# Patient Record
Sex: MALE | Race: WHITE | NOT HISPANIC OR LATINO | Employment: OTHER | URBAN - METROPOLITAN AREA
[De-identification: names, ages, dates, MRNs, and addresses within clinical notes are randomized per-mention and may not be internally consistent; named-entity substitution may affect disease eponyms.]

---

## 2017-09-12 ENCOUNTER — HISTORICAL (OUTPATIENT)
Dept: RADIOLOGY | Facility: HOSPITAL | Age: 62
End: 2017-09-12

## 2020-08-03 ENCOUNTER — HISTORICAL (OUTPATIENT)
Dept: RADIOLOGY | Facility: HOSPITAL | Age: 65
End: 2020-08-03

## 2020-08-04 ENCOUNTER — HISTORICAL (OUTPATIENT)
Dept: LAB | Facility: HOSPITAL | Age: 65
End: 2020-08-04

## 2020-08-06 ENCOUNTER — HISTORICAL (OUTPATIENT)
Dept: RESPIRATORY THERAPY | Facility: HOSPITAL | Age: 65
End: 2020-08-06

## 2020-08-19 ENCOUNTER — HISTORICAL (OUTPATIENT)
Dept: RADIOLOGY | Facility: HOSPITAL | Age: 65
End: 2020-08-19

## 2020-08-31 ENCOUNTER — HISTORICAL (OUTPATIENT)
Dept: RADIOLOGY | Facility: HOSPITAL | Age: 65
End: 2020-08-31

## 2021-05-25 ENCOUNTER — HISTORICAL (OUTPATIENT)
Dept: RADIOLOGY | Facility: HOSPITAL | Age: 66
End: 2021-05-25

## 2021-11-22 ENCOUNTER — HISTORICAL (OUTPATIENT)
Dept: LAB | Facility: HOSPITAL | Age: 66
End: 2021-11-22

## 2021-11-22 LAB
ABS NEUT (OLG): 3.1 X10(3)/MCL (ref 1.5–6.9)
ALBUMIN SERPL-MCNC: 4.5 GM/DL (ref 3.4–4.8)
ALBUMIN/GLOB SERPL: 1.5 RATIO (ref 1.1–2)
ALP SERPL-CCNC: 101 UNIT/L (ref 40–150)
ALT SERPL-CCNC: 11 UNIT/L (ref 0–55)
AST SERPL-CCNC: 21 UNIT/L (ref 5–34)
BASOPHILS # BLD AUTO: 0.1 X10(3)/MCL (ref 0–0.1)
BASOPHILS NFR BLD AUTO: 2 % (ref 0–1)
BILIRUB SERPL-MCNC: 1.3 MG/DL
BILIRUBIN DIRECT+TOT PNL SERPL-MCNC: 0.4 MG/DL (ref 0–0.5)
BILIRUBIN DIRECT+TOT PNL SERPL-MCNC: 0.9 MG/DL (ref 0–0.8)
BUN SERPL-MCNC: 8 MG/DL (ref 8.4–25.7)
CALCIUM SERPL-MCNC: 10.2 MG/DL (ref 8.7–10.5)
CHLORIDE SERPL-SCNC: 101 MMOL/L (ref 98–107)
CHOLEST SERPL-MCNC: 207 MG/DL
CHOLEST/HDLC SERPL: 4 {RATIO} (ref 0–5)
CO2 SERPL-SCNC: 30 MMOL/L (ref 23–31)
CREAT SERPL-MCNC: 0.86 MG/DL (ref 0.73–1.18)
EOSINOPHIL # BLD AUTO: 0.4 X10(3)/MCL (ref 0–0.6)
EOSINOPHIL NFR BLD AUTO: 6 % (ref 0–5)
ERYTHROCYTE [DISTWIDTH] IN BLOOD BY AUTOMATED COUNT: 12.6 % (ref 11.5–17)
GLOBULIN SER-MCNC: 3 GM/DL (ref 2.4–3.5)
GLUCOSE SERPL-MCNC: 97 MG/DL (ref 82–115)
HCT VFR BLD AUTO: 46.5 % (ref 42–52)
HDLC SERPL-MCNC: 58 MG/DL (ref 35–60)
HGB BLD-MCNC: 15.4 GM/DL (ref 14–18)
IMM GRANULOCYTES # BLD AUTO: 0.01 10*3/UL (ref 0–0.02)
IMM GRANULOCYTES NFR BLD AUTO: 0.2 % (ref 0–0.43)
LDLC SERPL CALC-MCNC: 134 MG/DL (ref 50–140)
LYMPHOCYTES # BLD AUTO: 2 X10(3)/MCL (ref 0.5–4.1)
LYMPHOCYTES NFR BLD AUTO: 32 % (ref 15–40)
MCH RBC QN AUTO: 31 PG (ref 27–34)
MCHC RBC AUTO-ENTMCNC: 33 GM/DL (ref 31–36)
MCV RBC AUTO: 94 FL (ref 80–99)
MONOCYTES # BLD AUTO: 0.7 X10(3)/MCL (ref 0–1.1)
MONOCYTES NFR BLD AUTO: 12 % (ref 4–12)
NEUTROPHILS # BLD AUTO: 3.1 X10(3)/MCL (ref 1.5–6.9)
NEUTROPHILS NFR BLD AUTO: 49 % (ref 43–75)
PLATELET # BLD AUTO: 238 X10(3)/MCL (ref 140–400)
PMV BLD AUTO: 10.9 FL (ref 6.8–10)
POTASSIUM SERPL-SCNC: 4.6 MMOL/L (ref 3.5–5.1)
PROT SERPL-MCNC: 7.5 GM/DL (ref 5.8–7.6)
PSA SERPL-MCNC: 0.44 NG/ML
RBC # BLD AUTO: 4.93 X10(6)/MCL (ref 4.7–6.1)
SODIUM SERPL-SCNC: 141 MMOL/L (ref 136–145)
TRIGL SERPL-MCNC: 76 MG/DL (ref 34–140)
TSH SERPL-ACNC: 2.21 UIU/ML (ref 0.35–4.94)
VLDLC SERPL CALC-MCNC: 15 MG/DL
WBC # SPEC AUTO: 6.2 X10(3)/MCL (ref 4.5–11.5)

## 2023-04-10 ENCOUNTER — HOSPITAL ENCOUNTER (EMERGENCY)
Facility: HOSPITAL | Age: 68
Discharge: HOME | End: 2023-04-10
Attending: INTERNAL MEDICINE
Payer: COMMERCIAL

## 2023-04-10 VITALS
SYSTOLIC BLOOD PRESSURE: 164 MMHG | HEIGHT: 64 IN | BODY MASS INDEX: 18.27 KG/M2 | DIASTOLIC BLOOD PRESSURE: 79 MMHG | HEART RATE: 91 BPM | WEIGHT: 107 LBS | RESPIRATION RATE: 19 BRPM | TEMPERATURE: 98 F | OXYGEN SATURATION: 98 %

## 2023-04-10 DIAGNOSIS — S63.125A CLOSED DISLOCATION OF INTERPHALANGEAL JOINT OF LEFT THUMB, INITIAL ENCOUNTER: Primary | ICD-10-CM

## 2023-04-10 PROCEDURE — 26770 TREAT FINGER DISLOCATION: CPT | Mod: FA

## 2023-04-10 PROCEDURE — 25000003 PHARM REV CODE 250: Performed by: INTERNAL MEDICINE

## 2023-04-10 PROCEDURE — 99285 EMERGENCY DEPT VISIT HI MDM: CPT | Mod: 25

## 2023-04-10 RX ORDER — LIDOCAINE HYDROCHLORIDE 10 MG/ML
10 INJECTION, SOLUTION EPIDURAL; INFILTRATION; INTRACAUDAL; PERINEURAL
Status: COMPLETED | OUTPATIENT
Start: 2023-04-10 | End: 2023-04-10

## 2023-04-10 RX ORDER — NAPROXEN 500 MG/1
500 TABLET ORAL 2 TIMES DAILY WITH MEALS
Qty: 30 TABLET | Refills: 0 | Status: SHIPPED | OUTPATIENT
Start: 2023-04-10 | End: 2023-04-25

## 2023-04-10 RX ADMIN — LIDOCAINE HYDROCHLORIDE 100 MG: 10 INJECTION, SOLUTION EPIDURAL; INFILTRATION; INTRACAUDAL; PERINEURAL at 10:04

## 2023-04-10 NOTE — DISCHARGE INSTRUCTIONS
See an orthopedics surgeon to evaluate, do further workup and treat it as necessary.    Telephone numbers of Orthopedics Available in [place] are provided above.    If applied , Keep the splint/ Ace on all the time till seen by Orthopedics and treated and cleared.    Take pain medicines as prescribed    Dr. Brock Bee  [phone]    Dr. Gary Kraus,  [phone]      Take medicines as prescribed    See your family doctor in one to 2 days for further evaluation, workup, and treatment as necessary    Avoid driving or operating machinery while taking medicines as some medicines might cause drowsiness and may cause problems. Also pain medicines have potential of being addictive  so use Pain meds specially Narcotics Sparingly.    The exam and treatment you received in Emergency Room was for an urgent problem and NOT INTENDED AS COMPLETE CARE. It is important that you FOLLOW UP with a doctor for ongoing care. If your symptoms become WORSE or you DO NOT IMPROVE and you are unable to reach your health care provider, you should RETURN to the emergency department. The Emergency Room doctor has provided a PRELIMINARY INTERPRETATION of all your STUDIES. A final interpretation may be done after you are discharged. IF A CHANGE in your diagnosis or treatment is needed WE WILL CONTACT YOU. It is critical that we have a CURRENT PHONE NUMBER FOR YOU.
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
## 2024-03-21 ENCOUNTER — HOSPITAL ENCOUNTER (INPATIENT)
Facility: HOSPITAL | Age: 69
LOS: 15 days | Discharge: HOME HEALTH | DRG: 234 | End: 2024-04-05
Attending: INTERNAL MEDICINE | Admitting: INTERNAL MEDICINE
Payer: COMMERCIAL

## 2024-03-21 DIAGNOSIS — I25.10 ATHEROSCLEROSIS OF NATIVE CORONARY ARTERY OF NATIVE HEART WITHOUT ANGINA PECTORIS: ICD-10-CM

## 2024-03-21 DIAGNOSIS — I25.10 CAD (CORONARY ARTERY DISEASE): Primary | ICD-10-CM

## 2024-03-21 DIAGNOSIS — R94.8 ABNORMAL PET SCAN, MEDIASTINUM: ICD-10-CM

## 2024-03-21 DIAGNOSIS — R07.9 CHEST PAIN: ICD-10-CM

## 2024-03-21 DIAGNOSIS — I49.9 ARRHYTHMIA: ICD-10-CM

## 2024-03-21 DIAGNOSIS — R07.9 CHEST PAIN AT REST: ICD-10-CM

## 2024-03-21 DIAGNOSIS — I48.91 A-FIB: ICD-10-CM

## 2024-03-21 LAB
APTT PPP: 41.4 SECONDS (ref 23.2–33.7)
APTT PPP: 43.3 SECONDS (ref 23.2–33.7)
BASOPHILS # BLD AUTO: 0.11 X10(3)/MCL
BASOPHILS NFR BLD AUTO: 1.3 %
EOSINOPHIL # BLD AUTO: 0.26 X10(3)/MCL (ref 0–0.9)
EOSINOPHIL NFR BLD AUTO: 3 %
ERYTHROCYTE [DISTWIDTH] IN BLOOD BY AUTOMATED COUNT: 13.3 % (ref 11.5–17)
HCT VFR BLD AUTO: 36.8 % (ref 42–52)
HGB BLD-MCNC: 12.4 G/DL (ref 14–18)
IMM GRANULOCYTES # BLD AUTO: 0.02 X10(3)/MCL (ref 0–0.04)
IMM GRANULOCYTES NFR BLD AUTO: 0.2 %
INR PPP: 1
LYMPHOCYTES # BLD AUTO: 2.36 X10(3)/MCL (ref 0.6–4.6)
LYMPHOCYTES NFR BLD AUTO: 27.2 %
MCH RBC QN AUTO: 31.5 PG (ref 27–31)
MCHC RBC AUTO-ENTMCNC: 33.7 G/DL (ref 33–36)
MCV RBC AUTO: 93.4 FL (ref 80–94)
MONOCYTES # BLD AUTO: 0.61 X10(3)/MCL (ref 0.1–1.3)
MONOCYTES NFR BLD AUTO: 7 %
NEUTROPHILS # BLD AUTO: 5.32 X10(3)/MCL (ref 2.1–9.2)
NEUTROPHILS NFR BLD AUTO: 61.3 %
NRBC BLD AUTO-RTO: 0 %
PLATELET # BLD AUTO: 237 X10(3)/MCL (ref 130–400)
PMV BLD AUTO: 10 FL (ref 7.4–10.4)
PROTHROMBIN TIME: 13.3 SECONDS (ref 12.5–14.5)
RBC # BLD AUTO: 3.94 X10(6)/MCL (ref 4.7–6.1)
WBC # SPEC AUTO: 8.68 X10(3)/MCL (ref 4.5–11.5)

## 2024-03-21 PROCEDURE — 85025 COMPLETE CBC W/AUTO DIFF WBC: CPT | Performed by: THORACIC SURGERY (CARDIOTHORACIC VASCULAR SURGERY)

## 2024-03-21 PROCEDURE — 21400001 HC TELEMETRY ROOM

## 2024-03-21 PROCEDURE — 4A023N7 MEASUREMENT OF CARDIAC SAMPLING AND PRESSURE, LEFT HEART, PERCUTANEOUS APPROACH: ICD-10-PCS | Performed by: INTERNAL MEDICINE

## 2024-03-21 PROCEDURE — 63600175 PHARM REV CODE 636 W HCPCS: Performed by: THORACIC SURGERY (CARDIOTHORACIC VASCULAR SURGERY)

## 2024-03-21 PROCEDURE — 99223 1ST HOSP IP/OBS HIGH 75: CPT | Mod: ,,, | Performed by: PHYSICIAN ASSISTANT

## 2024-03-21 PROCEDURE — B2181ZZ FLUOROSCOPY OF LEFT INTERNAL MAMMARY BYPASS GRAFT USING LOW OSMOLAR CONTRAST: ICD-10-PCS | Performed by: INTERNAL MEDICINE

## 2024-03-21 PROCEDURE — G0278 ILIAC ART ANGIO,CARDIAC CATH: HCPCS | Performed by: INTERNAL MEDICINE

## 2024-03-21 PROCEDURE — 99153 MOD SED SAME PHYS/QHP EA: CPT | Performed by: INTERNAL MEDICINE

## 2024-03-21 PROCEDURE — C1769 GUIDE WIRE: HCPCS | Performed by: INTERNAL MEDICINE

## 2024-03-21 PROCEDURE — B4101ZZ FLUOROSCOPY OF ABDOMINAL AORTA USING LOW OSMOLAR CONTRAST: ICD-10-PCS | Performed by: INTERNAL MEDICINE

## 2024-03-21 PROCEDURE — 25000003 PHARM REV CODE 250: Performed by: INTERNAL MEDICINE

## 2024-03-21 PROCEDURE — B2151ZZ FLUOROSCOPY OF LEFT HEART USING LOW OSMOLAR CONTRAST: ICD-10-PCS | Performed by: INTERNAL MEDICINE

## 2024-03-21 PROCEDURE — 85730 THROMBOPLASTIN TIME PARTIAL: CPT | Performed by: THORACIC SURGERY (CARDIOTHORACIC VASCULAR SURGERY)

## 2024-03-21 PROCEDURE — 11000001 HC ACUTE MED/SURG PRIVATE ROOM

## 2024-03-21 PROCEDURE — 93459 L HRT ART/GRFT ANGIO: CPT | Performed by: INTERNAL MEDICINE

## 2024-03-21 PROCEDURE — 5A0945A ASSISTANCE WITH RESPIRATORY VENTILATION, 24-96 CONSECUTIVE HOURS, HIGH NASAL FLOW/VELOCITY: ICD-10-PCS | Performed by: THORACIC SURGERY (CARDIOTHORACIC VASCULAR SURGERY)

## 2024-03-21 PROCEDURE — 85730 THROMBOPLASTIN TIME PARTIAL: CPT | Performed by: INTERNAL MEDICINE

## 2024-03-21 PROCEDURE — 63600175 PHARM REV CODE 636 W HCPCS: Performed by: INTERNAL MEDICINE

## 2024-03-21 PROCEDURE — 25500020 PHARM REV CODE 255: Performed by: INTERNAL MEDICINE

## 2024-03-21 PROCEDURE — 99152 MOD SED SAME PHYS/QHP 5/>YRS: CPT | Performed by: INTERNAL MEDICINE

## 2024-03-21 PROCEDURE — 85610 PROTHROMBIN TIME: CPT | Performed by: THORACIC SURGERY (CARDIOTHORACIC VASCULAR SURGERY)

## 2024-03-21 PROCEDURE — B2111ZZ FLUOROSCOPY OF MULTIPLE CORONARY ARTERIES USING LOW OSMOLAR CONTRAST: ICD-10-PCS | Performed by: INTERNAL MEDICINE

## 2024-03-21 PROCEDURE — C1887 CATHETER, GUIDING: HCPCS | Performed by: INTERNAL MEDICINE

## 2024-03-21 PROCEDURE — 27000221 HC OXYGEN, UP TO 24 HOURS

## 2024-03-21 PROCEDURE — 36221 PLACE CATH THORACIC AORTA: CPT | Performed by: INTERNAL MEDICINE

## 2024-03-21 RX ORDER — ASPIRIN 81 MG/1
81 TABLET ORAL DAILY
Status: DISCONTINUED | OUTPATIENT
Start: 2024-03-22 | End: 2024-03-25

## 2024-03-21 RX ORDER — HYDRALAZINE HYDROCHLORIDE 20 MG/ML
10 INJECTION INTRAMUSCULAR; INTRAVENOUS EVERY 4 HOURS PRN
Status: DISCONTINUED | OUTPATIENT
Start: 2024-03-21 | End: 2024-03-22

## 2024-03-21 RX ORDER — HEPARIN SODIUM,PORCINE/D5W 25000/250
0-40 INTRAVENOUS SOLUTION INTRAVENOUS CONTINUOUS
Status: DISCONTINUED | OUTPATIENT
Start: 2024-03-21 | End: 2024-03-21

## 2024-03-21 RX ORDER — DIAZEPAM 5 MG/1
10 TABLET ORAL
Status: DISPENSED | OUTPATIENT
Start: 2024-03-21

## 2024-03-21 RX ORDER — FENTANYL CITRATE 50 UG/ML
INJECTION, SOLUTION INTRAMUSCULAR; INTRAVENOUS
Status: DISCONTINUED | OUTPATIENT
Start: 2024-03-21 | End: 2024-03-21 | Stop reason: HOSPADM

## 2024-03-21 RX ORDER — ATORVASTATIN CALCIUM 40 MG/1
40 TABLET, FILM COATED ORAL DAILY
Status: DISCONTINUED | OUTPATIENT
Start: 2024-03-22 | End: 2024-04-05 | Stop reason: HOSPADM

## 2024-03-21 RX ORDER — PANTOPRAZOLE SODIUM 40 MG/1
40 TABLET, DELAYED RELEASE ORAL DAILY
Status: DISCONTINUED | OUTPATIENT
Start: 2024-03-21 | End: 2024-04-05 | Stop reason: HOSPADM

## 2024-03-21 RX ORDER — ONDANSETRON HYDROCHLORIDE 2 MG/ML
4 INJECTION, SOLUTION INTRAVENOUS EVERY 12 HOURS PRN
Status: DISCONTINUED | OUTPATIENT
Start: 2024-03-21 | End: 2024-04-05 | Stop reason: HOSPADM

## 2024-03-21 RX ORDER — NITROGLYCERIN 0.4 MG/1
0.4 TABLET SUBLINGUAL EVERY 5 MIN PRN
Status: DISCONTINUED | OUTPATIENT
Start: 2024-03-21 | End: 2024-04-05 | Stop reason: HOSPADM

## 2024-03-21 RX ORDER — MIDAZOLAM HYDROCHLORIDE 1 MG/ML
INJECTION INTRAMUSCULAR; INTRAVENOUS
Status: DISCONTINUED | OUTPATIENT
Start: 2024-03-21 | End: 2024-03-21 | Stop reason: HOSPADM

## 2024-03-21 RX ORDER — VERAPAMIL HYDROCHLORIDE 2.5 MG/ML
INJECTION, SOLUTION INTRAVENOUS
Status: DISCONTINUED | OUTPATIENT
Start: 2024-03-21 | End: 2024-03-21 | Stop reason: HOSPADM

## 2024-03-21 RX ORDER — METOPROLOL TARTRATE 25 MG/1
25 TABLET, FILM COATED ORAL DAILY
Status: ON HOLD | COMMUNITY
End: 2024-03-21 | Stop reason: HOSPADM

## 2024-03-21 RX ORDER — DIPHENHYDRAMINE HCL 25 MG
50 CAPSULE ORAL
Status: DISPENSED | OUTPATIENT
Start: 2024-03-21

## 2024-03-21 RX ORDER — AMLODIPINE BESYLATE 5 MG/1
10 TABLET ORAL DAILY
Status: DISCONTINUED | OUTPATIENT
Start: 2024-03-22 | End: 2024-03-25

## 2024-03-21 RX ORDER — SODIUM CHLORIDE 0.9 % (FLUSH) 0.9 %
10 SYRINGE (ML) INJECTION
Status: ACTIVE | OUTPATIENT
Start: 2024-03-21

## 2024-03-21 RX ORDER — MORPHINE SULFATE 4 MG/ML
2 INJECTION, SOLUTION INTRAMUSCULAR; INTRAVENOUS
Status: DISCONTINUED | OUTPATIENT
Start: 2024-03-21 | End: 2024-03-24

## 2024-03-21 RX ORDER — ASPIRIN 81 MG/1
81 TABLET ORAL DAILY
COMMUNITY

## 2024-03-21 RX ORDER — HEPARIN SODIUM,PORCINE/D5W 25000/250
0-40 INTRAVENOUS SOLUTION INTRAVENOUS CONTINUOUS
Status: DISCONTINUED | OUTPATIENT
Start: 2024-03-21 | End: 2024-03-25

## 2024-03-21 RX ORDER — NITROGLYCERIN 20 MG/100ML
INJECTION INTRAVENOUS
Status: DISCONTINUED | OUTPATIENT
Start: 2024-03-21 | End: 2024-03-21 | Stop reason: HOSPADM

## 2024-03-21 RX ORDER — HEPARIN SODIUM 1000 [USP'U]/ML
INJECTION, SOLUTION INTRAVENOUS; SUBCUTANEOUS
Status: DISCONTINUED | OUTPATIENT
Start: 2024-03-21 | End: 2024-03-21 | Stop reason: HOSPADM

## 2024-03-21 RX ORDER — METOPROLOL SUCCINATE 25 MG/1
25 TABLET, EXTENDED RELEASE ORAL DAILY
Status: DISCONTINUED | OUTPATIENT
Start: 2024-03-22 | End: 2024-03-22

## 2024-03-21 RX ORDER — AMLODIPINE BESYLATE 10 MG/1
10 TABLET ORAL DAILY
COMMUNITY

## 2024-03-21 RX ORDER — METOPROLOL SUCCINATE 25 MG/1
25 TABLET, EXTENDED RELEASE ORAL DAILY
Qty: 30 TABLET | Refills: 11 | Status: SHIPPED | OUTPATIENT
Start: 2024-03-21 | End: 2025-03-21

## 2024-03-21 RX ORDER — SODIUM CHLORIDE 9 MG/ML
INJECTION, SOLUTION INTRAVENOUS CONTINUOUS
Status: DISCONTINUED | OUTPATIENT
Start: 2024-03-21 | End: 2024-03-21

## 2024-03-21 RX ORDER — LIDOCAINE HYDROCHLORIDE 10 MG/ML
INJECTION INFILTRATION; PERINEURAL
Status: DISCONTINUED | OUTPATIENT
Start: 2024-03-21 | End: 2024-03-21 | Stop reason: HOSPADM

## 2024-03-21 RX ADMIN — DIPHENHYDRAMINE HYDROCHLORIDE 50 MG: 25 CAPSULE ORAL at 10:03

## 2024-03-21 RX ADMIN — DIAZEPAM 10 MG: 5 TABLET ORAL at 10:03

## 2024-03-21 RX ADMIN — HEPARIN SODIUM 12 UNITS/KG/HR: 10000 INJECTION, SOLUTION INTRAVENOUS at 02:03
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
## 2024-03-21 NOTE — H&P
[facility]    Cardiology  History and Physical     Patient Name: Ata Pickens  MRN: [id number]  Admission Date: 3/21/2024  Code Status: No Order   Attending Provider: Monse Lima MD   Primary Care Physician: No, Primary Doctor  Principal Problem:<principal problem not specified>    Patient information was obtained from patient, past medical records, ER records, and primary team.     Subjective:   Chief Complaint:  Outpatient Cath- MV CAD- CABG Evaluation    HPI:   Mr. Pickens is a 68 year old male, known to Dr. Lima, who presented to the hospital and underwent elective coronary angiogram due to diagnosis of abnormal stress test which was performed in the outpatient setting due to reported history of chest pain, SOB, and fatigue with minimal exertion. He underwent cath on 3.21.24 revealing MV CAD including 90% distal LM/Ostial LAD Disease. Patient's EF on LV Gram noted to be 50%. Patient was admitted to [facility]. CT Surgical services consulted for CABG evaluation.    PMH: Hypertension, Claudication, SOB, CP, Chronic Tobacco Use  PSH: Wrist Surgery, Colostomy Closure  Family History: Father- Lung Cancer, Mother- Lung Cancer, Brother- Cardiac Pacemaker  Social History: Tobacco- Active Smoker, Alcohol- Negative, Substance Abuse- Negative    Previous Cardiac Diagnostics:   Coronary Angiogram (3.21.24):  Coronary findings:  Dominance: right   Left main:  Calcified distal 90% stenosis extending ostium of the LAD.    Left anterior descending artery:  Type three-vessel with calcified ostial stenosis of at least 70%.    Circumflex artery:  Nondominant calcified vessel with proximal 40-50% stenosis.  The circumflex gives rise to a large patent bifurcating OM.  Right coronary artery:  Calcified dominant vessel with serial heavily calcified segment stenosis of up to 80-90%.  The RCA terminates in a moderate bifurcating PDA and a small PL segment.    Selective LIMA angiogram:   Large widely patent vessel  Aortic root angiogram:  Calcified Type 3 bovine arch with patent innominate and proximal ICA bilateral.  Abdominal angiogram: No AAA.  Left iliac system patent.  Left common femoral artery patent proximally.  Right common and internal iliac patent.  Right external iliac with 70-80% stenosis at the pelvic brim.  Right common femoral occluded.  Left ventriculography:  EF- 50%.    Hemodynamics:LV/AO= 0 mmHg                      LVEDP= 13-16 mmHg      Carotid US (3.21.24):  The right internal carotid artery demonstrated 50-69% stenosis.   The left internal carotid artery demonstrated less than 50% stenosis.  Bilateral vertebral arteries were patent with antegrade flow.    Echocardiogram (3.5.24):  The left ventricle is decreased in size. Global left ventricular systolic function is borderline normal. The left ventricular ejection fraction is 50%. The left ventricle diastolic function is impaired (Grade I) with normal left atrial pressure.   The right atrium is moderately enlarged ~5.1 cm.  Mild (1+) tricuspid and trace mitral regurgitation.  The pulmonary artery systolic pressure is 23 mmHg.  The study quality is below average.     CV US Arterial Lower Extremities (3.5.24):  The study quality is good.   The right proximal superficial femoral artery is occluded.   The right mid and distal superficial femoral, posterior tibial, peroneal, anterior tibial, and dorsalis pedis arteries exhibit poor, diminished perfusion via collateral flow.  The right lower extremity arteries exhibit mono-phasic waveforms.  The left external iliac and common femoral arteries exhibit mono-phasic waveforms.  The remaining arteries of the left lower extremity exhibit bi-phasic waveforms.     PET (3.1.24):  This is an abnormal perfusion study. Study is consistent with significant anterior  ischemia.   This scan is suggestive of moderate to high risk for future cardiovascular events.   Large partially reversible  perfusion abnormality of severe intensity in the anterior apical region. Large partially reversible perfusion abnormality of severe intensity in the anterior septal region. Small reversible perfusion abnormality of severe intensity in the apical lateral segment.   The left ventricular cavity is noted to be normal on the stress studies. The stress left ventricular ejection fraction was calculated to be 37% and left ventricular global function is moderately reduced. The rest left ventricular cavity is noted to be normal. The rest left ventricular ejection fraction was calculated to be 52% and rest left ventricular global function is normal.   Hypokinesis of apical anterior segment is noted only in the stress studies which is suggestive of new ischemia. Persistent hypokinesis of the septal region is noted in both rest and stress studies. When compared to the resting ejection fraction (52%), the stress ejection fraction (37%) has decreased.   Transient ischemic dilatation is present and has been described as a marker for high risk coronary artery disease. It has also been described in microvascular disease, hypertensive heart disease as well as cardiac deconditioning.   The study quality is good.   There was no rise in myocardial blood flow between rest and stress.  Global myocardial blood flow reserve was 0.83.  Myocardial blood flow reserve is globally abnormal, placing the patient at a higher coronary event risk.    Review of patient's allergies indicates:   Allergen Reactions    Codeine        No current facility-administered medications on file prior to encounter.     Current Outpatient Medications on File Prior to Encounter   Medication Sig    amLODIPine (NORVASC) 10 MG tablet Take 10 mg by mouth once daily.    aspirin (ECOTRIN) 81 MG EC tablet Take 81 mg by mouth once daily.    [DISCONTINUED] metoprolol tartrate (LOPRESSOR) 25 MG tablet Take 25 mg by mouth once daily.     Review of Systems   Cardiovascular:   Negative for chest pain.   Respiratory:  Negative for shortness of breath.    All other systems reviewed and are negative.    Objective:     Vital Signs (Most Recent):  Temp: 97.6 °F (36.4 °C) (03/21/24 0851)  Pulse: (!) 40 (03/21/24 1230)  Resp: 15 (03/21/24 1230)  BP: (!) 118/58 (03/21/24 1230)  SpO2: 95 % (03/21/24 1230) Vital Signs (24h Range):  Temp:  [97.6 °F (36.4 °C)] 97.6 °F (36.4 °C)  Pulse:  [39-54] 40  Resp:  [15-19] 15  SpO2:  [95 %-98 %] 95 %  BP: (112-158)/(50-86) 118/58     Weight: 48.2 kg (106 lb 4.2 oz)  Body mass index is 18.24 kg/m².    SpO2: 95 %         Intake/Output Summary (Last 24 hours) at 3/21/2024 1247  Last data filed at 3/21/2024 1215  Gross per 24 hour   Intake --   Output 300 ml   Net -300 ml       Lines/Drains/Airways       Peripheral Intravenous Line  Duration                  Peripheral IV - Single Lumen 03/21/24 0900 20 G Anterior;Left Forearm <1 day                    Physical Exam  Vitals and nursing note reviewed.   Constitutional:       General: He is not in acute distress.     Appearance: Normal appearance. He is not ill-appearing.   HENT:      Head: Normocephalic.      Mouth/Throat:      Mouth: Mucous membranes are moist.      Pharynx: Oropharynx is clear.   Cardiovascular:      Rate and Rhythm: Regular rhythm. Bradycardia present.      Heart sounds: Normal heart sounds.   Pulmonary:      Effort: Pulmonary effort is normal. No respiratory distress.      Breath sounds: Normal breath sounds. No wheezing or rales.      Comments: NC 2 L/Min  Abdominal:      Palpations: Abdomen is soft.   Musculoskeletal:         General: Normal range of motion.      Cervical back: Neck supple.      Right lower leg: No edema.      Left lower leg: No edema.      Comments: BLE Warm   Skin:     General: Skin is warm and dry.      Comments: Right Radial TR Band in Progress. No Evidence of bleed or hematoma noted.    Neurological:      General: No focal deficit present.      Mental Status: He is alert  and oriented to person, place, and time. Mental status is at baseline.   Psychiatric:         Mood and Affect: Mood normal.         Behavior: Behavior normal.       Telemetry: Sinus Rhythm     Assessment and Plan:   Impression:  CAD (Multivessel)    - LM: 90% Distal, LAD: 70% Ostial, LCX: 40-50% Proximal, RCA: (Dominant) 80-90% Serially Calcified Lesions (EF 50%) (3.21.24)  Hypertension  PAD    - REIA 70-80% Stenosis at Pelvic Brim (3.21.24)  Nicotine Dependence  ROBBI    - 50-69% MUSTAPHA (CUS 3.21.24)    Plan:  Add Aspirin 81 Mg Daily & Continue Statin  Resume Home Medications, with hold parameters for beta blocker  Start Heparin Infusion (No Bolus) 3 Hours after TR Band Protocol Complete  CT Surgery Consulted for CABG Evaluation  NTG SL PRN CP  Routine Labs in AM  Monitor on Tele    VTE Risk Mitigation (From admission, onward)           Ordered     heparin (porcine) injection  As needed (PRN)         03/21/24 1121                  BOUBACAR Leigh  Cardiology  [facility] - Cath Lab Services  03/21/2024 12:47 PM     I agree with the findings of the complexity of problems addressed and take responsibility for the plan's risks and complications. I approved the plan documented by Chetna Rivera NP.

## 2024-03-21 NOTE — INTERVAL H&P NOTE
Patient name: Ata Pickens  MRN: [id number]  : 1955  Cath Lab Procedure H&P Update    Pre-Procedure Assessment:    I saw and examined the patient face to face. The patient has been re-evaluated and his condition is unchanged. The reason for admission, procedure and care is still present.  Based on the patients H&P, pre-procedure physical exam, relevant diagnostic studies, NPO status and information obtained from the patient, I determine the patient is an appropriate candidate for the proposed procedure and anesthesia planned. I further certify the anesthesia risks, benefits and options have been explained to the patient to which he agrees as documented on the procedural consent.

## 2024-03-22 LAB
ALBUMIN SERPL-MCNC: 3.7 G/DL (ref 3.4–4.8)
ALBUMIN/GLOB SERPL: 1.2 RATIO (ref 1.1–2)
ALP SERPL-CCNC: 77 UNIT/L (ref 40–150)
ALT SERPL-CCNC: 10 UNIT/L (ref 0–55)
APTT PPP: 65.9 SECONDS (ref 23.2–33.7)
APTT PPP: 71.9 SECONDS (ref 23.2–33.7)
AST SERPL-CCNC: 16 UNIT/L (ref 5–34)
BASOPHILS # BLD AUTO: 0.11 X10(3)/MCL
BASOPHILS NFR BLD AUTO: 1.5 %
BILIRUB SERPL-MCNC: 0.9 MG/DL
BUN SERPL-MCNC: 15.6 MG/DL (ref 8.4–25.7)
CALCIUM SERPL-MCNC: 9.3 MG/DL (ref 8.8–10)
CHLORIDE SERPL-SCNC: 105 MMOL/L (ref 98–107)
CO2 SERPL-SCNC: 24 MMOL/L (ref 23–31)
CREAT SERPL-MCNC: 0.86 MG/DL (ref 0.73–1.18)
EOSINOPHIL # BLD AUTO: 0.34 X10(3)/MCL (ref 0–0.9)
EOSINOPHIL NFR BLD AUTO: 4.7 %
ERYTHROCYTE [DISTWIDTH] IN BLOOD BY AUTOMATED COUNT: 13.2 % (ref 11.5–17)
GFR SERPLBLD CREATININE-BSD FMLA CKD-EPI: >60 MLS/MIN/1.73/M2
GLOBULIN SER-MCNC: 3 GM/DL (ref 2.4–3.5)
GLUCOSE SERPL-MCNC: 99 MG/DL (ref 82–115)
HCT VFR BLD AUTO: 37.1 % (ref 42–52)
HGB BLD-MCNC: 12.6 G/DL (ref 14–18)
IMM GRANULOCYTES # BLD AUTO: 0.01 X10(3)/MCL (ref 0–0.04)
IMM GRANULOCYTES NFR BLD AUTO: 0.1 %
LEFT CCA DIST DIAS: 13 CM/S
LEFT CCA DIST SYS: 61 CM/S
LEFT CCA PROX DIAS: 14 CM/S
LEFT CCA PROX SYS: 81 CM/S
LEFT ECA DIAS: 0 CM/S
LEFT ECA SYS: 50 CM/S
LEFT ICA DIST DIAS: 20 CM/S
LEFT ICA DIST SYS: 76 CM/S
LEFT ICA MID DIAS: 25 CM/S
LEFT ICA MID SYS: 90 CM/S
LEFT ICA PROX DIAS: 25 CM/S
LEFT ICA PROX SYS: 98 CM/S
LEFT VERTEBRAL DIAS: 6 CM/S
LEFT VERTEBRAL SYS: 38 CM/S
LYMPHOCYTES # BLD AUTO: 2.62 X10(3)/MCL (ref 0.6–4.6)
LYMPHOCYTES NFR BLD AUTO: 36.3 %
MAGNESIUM SERPL-MCNC: 2 MG/DL (ref 1.6–2.6)
MCH RBC QN AUTO: 31.7 PG (ref 27–31)
MCHC RBC AUTO-ENTMCNC: 34 G/DL (ref 33–36)
MCV RBC AUTO: 93.5 FL (ref 80–94)
MONOCYTES # BLD AUTO: 0.59 X10(3)/MCL (ref 0.1–1.3)
MONOCYTES NFR BLD AUTO: 8.2 %
NEUTROPHILS # BLD AUTO: 3.55 X10(3)/MCL (ref 2.1–9.2)
NEUTROPHILS NFR BLD AUTO: 49.2 %
NRBC BLD AUTO-RTO: 0 %
OHS CV CAROTID RIGHT ICA EDV HIGHEST: 38
OHS CV CAROTID ULTRASOUND LEFT ICA/CCA RATIO: 1.61
OHS CV CAROTID ULTRASOUND RIGHT ICA/CCA RATIO: 4.07
OHS CV PV CAROTID LEFT HIGHEST CCA: 81
OHS CV PV CAROTID LEFT HIGHEST ICA: 98
OHS CV PV CAROTID RIGHT HIGHEST CCA: 75
OHS CV PV CAROTID RIGHT HIGHEST ICA: 171
OHS CV US CAROTID LEFT HIGHEST EDV: 25
OHS QRS DURATION: 96 MS
OHS QTC CALCULATION: 434 MS
PLATELET # BLD AUTO: 209 X10(3)/MCL (ref 130–400)
PMV BLD AUTO: 10 FL (ref 7.4–10.4)
POTASSIUM SERPL-SCNC: 4.2 MMOL/L (ref 3.5–5.1)
PROT SERPL-MCNC: 6.7 GM/DL (ref 5.8–7.6)
RBC # BLD AUTO: 3.97 X10(6)/MCL (ref 4.7–6.1)
RIGHT CCA DIST DIAS: 7 CM/S
RIGHT CCA DIST SYS: 42 CM/S
RIGHT CCA PROX DIAS: 12 CM/S
RIGHT CCA PROX SYS: 75 CM/S
RIGHT ECA DIAS: 0 CM/S
RIGHT ECA SYS: 66 CM/S
RIGHT ICA DIST DIAS: 10 CM/S
RIGHT ICA DIST SYS: 38 CM/S
RIGHT ICA MID DIAS: 38 CM/S
RIGHT ICA MID SYS: 171 CM/S
RIGHT ICA PROX DIAS: 37 CM/S
RIGHT ICA PROX SYS: 167 CM/S
RIGHT VERTEBRAL DIAS: 9 CM/S
RIGHT VERTEBRAL SYS: 88 CM/S
SODIUM SERPL-SCNC: 138 MMOL/L (ref 136–145)
WBC # SPEC AUTO: 7.22 X10(3)/MCL (ref 4.5–11.5)

## 2024-03-22 PROCEDURE — 63600175 PHARM REV CODE 636 W HCPCS: Mod: JZ,JG | Performed by: NURSE PRACTITIONER

## 2024-03-22 PROCEDURE — 85730 THROMBOPLASTIN TIME PARTIAL: CPT | Performed by: INTERNAL MEDICINE

## 2024-03-22 PROCEDURE — 93005 ELECTROCARDIOGRAM TRACING: CPT

## 2024-03-22 PROCEDURE — 63600175 PHARM REV CODE 636 W HCPCS: Performed by: THORACIC SURGERY (CARDIOTHORACIC VASCULAR SURGERY)

## 2024-03-22 PROCEDURE — 25000003 PHARM REV CODE 250: Performed by: NURSE PRACTITIONER

## 2024-03-22 PROCEDURE — 63600175 PHARM REV CODE 636 W HCPCS: Performed by: NURSE PRACTITIONER

## 2024-03-22 PROCEDURE — 21400001 HC TELEMETRY ROOM

## 2024-03-22 PROCEDURE — 85025 COMPLETE CBC W/AUTO DIFF WBC: CPT | Performed by: NURSE PRACTITIONER

## 2024-03-22 PROCEDURE — 83735 ASSAY OF MAGNESIUM: CPT | Performed by: NURSE PRACTITIONER

## 2024-03-22 PROCEDURE — 80053 COMPREHEN METABOLIC PANEL: CPT | Performed by: NURSE PRACTITIONER

## 2024-03-22 PROCEDURE — 99024 POSTOP FOLLOW-UP VISIT: CPT | Mod: ,,, | Performed by: PHYSICIAN ASSISTANT

## 2024-03-22 PROCEDURE — 25000242 PHARM REV CODE 250 ALT 637 W/ HCPCS: Performed by: NURSE PRACTITIONER

## 2024-03-22 PROCEDURE — 93010 ELECTROCARDIOGRAM REPORT: CPT | Mod: ,,, | Performed by: INTERNAL MEDICINE

## 2024-03-22 RX ORDER — ISOSORBIDE MONONITRATE 30 MG/1
30 TABLET, EXTENDED RELEASE ORAL DAILY
Status: DISCONTINUED | OUTPATIENT
Start: 2024-03-22 | End: 2024-03-22

## 2024-03-22 RX ORDER — NITROGLYCERIN 20 MG/100ML
0-400 INJECTION INTRAVENOUS CONTINUOUS
Status: DISCONTINUED | OUTPATIENT
Start: 2024-03-22 | End: 2024-03-26

## 2024-03-22 RX ORDER — HYDRALAZINE HYDROCHLORIDE 20 MG/ML
10 INJECTION INTRAMUSCULAR; INTRAVENOUS EVERY 4 HOURS PRN
Status: DISCONTINUED | OUTPATIENT
Start: 2024-03-22 | End: 2024-04-05 | Stop reason: HOSPADM

## 2024-03-22 RX ADMIN — NITROGLYCERIN 0.4 MG: 0.4 TABLET, ORALLY DISINTEGRATING SUBLINGUAL at 07:03

## 2024-03-22 RX ADMIN — ISOSORBIDE MONONITRATE 30 MG: 30 TABLET, EXTENDED RELEASE ORAL at 05:03

## 2024-03-22 RX ADMIN — PANTOPRAZOLE SODIUM 40 MG: 40 TABLET, DELAYED RELEASE ORAL at 09:03

## 2024-03-22 RX ADMIN — ATORVASTATIN CALCIUM 40 MG: 40 TABLET, FILM COATED ORAL at 09:03

## 2024-03-22 RX ADMIN — AMLODIPINE BESYLATE 10 MG: 5 TABLET ORAL at 09:03

## 2024-03-22 RX ADMIN — NITROGLYCERIN 5 MCG/MIN: 20 INJECTION INTRAVENOUS at 08:03

## 2024-03-22 RX ADMIN — HEPARIN SODIUM 15 UNITS/KG/HR: 10000 INJECTION, SOLUTION INTRAVENOUS at 07:03

## 2024-03-22 RX ADMIN — Medication 81 MG: at 09:03

## 2024-03-22 RX ADMIN — HYDRALAZINE HYDROCHLORIDE 10 MG: 20 INJECTION INTRAMUSCULAR; INTRAVENOUS at 07:03

## 2024-03-22 NOTE — NURSING
Nurses Note -- 4 Eyes      3/21/2024   7:17 PM      Skin assessed during: Admit      [x] No Altered Skin Integrity Present    []Prevention Measures Documented      [] Yes- Altered Skin Integrity Present or Discovered   [] LDA Added if Not in Epic (Describe Wound)   [] New Altered Skin Integrity was Present on Admit and Documented in LDA   [] Wound Image Taken    Wound Care Consulted? No    Attending Nurse:  Yanna Forte RN/Staff Member:   Radha

## 2024-03-22 NOTE — PROGRESS NOTES
[facility] - 6th Floor [facility] Telemetry    Cardiology  Progress Note    Patient Name: Ata Pickens  MRN: [id number]  Admission Date: 3/21/2024  Hospital Length of Stay: 1 days  Code Status: No Order   Attending Physician: Monse Lima MD   Primary Care Physician: Lorraine, Primary Doctor  Expected Discharge Date:   Principal Problem:<principal problem not specified>    Subjective:   Chief Complaint:  Outpatient Cath- MV CAD- CABG Evaluation     HPI:   Mr. Pickens is a 68 year old male, known to Dr. Lima, who presented to the hospital and underwent elective coronary angiogram due to diagnosis of abnormal stress test which was performed in the outpatient setting due to reported history of chest pain, SOB, and fatigue with minimal exertion. He underwent cath on 3.21.24 revealing MV CAD including 90% distal LM/Ostial LAD Disease. Patient's EF on LV Gram noted to be 50%. Patient was admitted to [facility]. CT Surgical services consulted for CABG evaluation.    Hospital Course:  3.22.24: NAD Noted. SB on Tele. BP Stable. CP This AM, Went away on its own. No CP Currently. Right Radial Site soft.     PMH: Hypertension, Claudication, SOB, CP, Chronic Tobacco Use  PSH: Wrist Surgery, Colostomy Closure  Family History: Father- Lung Cancer, Mother- Lung Cancer, Brother- Cardiac Pacemaker  Social History: Tobacco- Active Smoker, Alcohol- Negative, Substance Abuse- Negative     Previous Cardiac Diagnostics:   Coronary Angiogram (3.21.24):  Coronary findings:  Dominance: right   Left main:  Calcified distal 90% stenosis extending ostium of the LAD.    Left anterior descending artery:  Type three-vessel with calcified ostial stenosis of at least 70%.    Circumflex artery:  Nondominant calcified vessel with proximal 40-50% stenosis.  The circumflex gives rise to a large patent bifurcating OM.  Right coronary artery:  Calcified dominant vessel with serial heavily calcified segment stenosis of up to 80-90%.   The RCA terminates in a moderate bifurcating PDA and a small PL segment.    Selective LIMA angiogram:  Large widely patent vessel  Aortic root angiogram:  Calcified Type 3 bovine arch with patent innominate and proximal ICA bilateral.  Abdominal angiogram: No AAA.  Left iliac system patent.  Left common femoral artery patent proximally.  Right common and internal iliac patent.  Right external iliac with 70-80% stenosis at the pelvic brim.  Right common femoral occluded.  Left ventriculography:  EF- 50%.    Hemodynamics:LV/AO= 0 mmHg                      LVEDP= 13-16 mmHg       Carotid US (3.21.24):  The right internal carotid artery demonstrated 50-69% stenosis.   The left internal carotid artery demonstrated less than 50% stenosis.  Bilateral vertebral arteries were patent with antegrade flow.     Echocardiogram (3.5.24):  The left ventricle is decreased in size. Global left ventricular systolic function is borderline normal. The left ventricular ejection fraction is 50%. The left ventricle diastolic function is impaired (Grade I) with normal left atrial pressure.   The right atrium is moderately enlarged ~5.1 cm.  Mild (1+) tricuspid and trace mitral regurgitation.  The pulmonary artery systolic pressure is 23 mmHg.  The study quality is below average.      CV US Arterial Lower Extremities (3.5.24):  The study quality is good.   The right proximal superficial femoral artery is occluded.   The right mid and distal superficial femoral, posterior tibial, peroneal, anterior tibial, and dorsalis pedis arteries exhibit poor, diminished perfusion via collateral flow.  The right lower extremity arteries exhibit mono-phasic waveforms.  The left external iliac and common femoral arteries exhibit mono-phasic waveforms.  The remaining arteries of the left lower extremity exhibit bi-phasic waveforms.      PET (3.1.24):  This is an abnormal perfusion study. Study is consistent with significant anterior  ischemia.   This scan is  suggestive of moderate to high risk for future cardiovascular events.   Large partially reversible perfusion abnormality of severe intensity in the anterior apical region. Large partially reversible perfusion abnormality of severe intensity in the anterior septal region. Small reversible perfusion abnormality of severe intensity in the apical lateral segment.   The left ventricular cavity is noted to be normal on the stress studies. The stress left ventricular ejection fraction was calculated to be 37% and left ventricular global function is moderately reduced. The rest left ventricular cavity is noted to be normal. The rest left ventricular ejection fraction was calculated to be 52% and rest left ventricular global function is normal.   Hypokinesis of apical anterior segment is noted only in the stress studies which is suggestive of new ischemia. Persistent hypokinesis of the septal region is noted in both rest and stress studies. When compared to the resting ejection fraction (52%), the stress ejection fraction (37%) has decreased.   Transient ischemic dilatation is present and has been described as a marker for high risk coronary artery disease. It has also been described in microvascular disease, hypertensive heart disease as well as cardiac deconditioning.   The study quality is good.   There was no rise in myocardial blood flow between rest and stress.  Global myocardial blood flow reserve was 0.83.  Myocardial blood flow reserve is globally abnormal, placing the patient at a higher coronary event risk.    Review of Systems   Cardiovascular:  Negative for leg swelling.        No CP at Current. Had one episode of Sharp CP This AM, Resolved Spontaneously.   Respiratory:  Negative for shortness of breath.    All other systems reviewed and are negative.    Objective:     Vital Signs (Most Recent):  Temp: 97.9 °F (36.6 °C) (03/22/24 0247)  Pulse: (!) 52 (03/22/24 0800)  Resp: 18 (03/22/24 0247)  BP: 134/68  (03/22/24 0247)  SpO2: 97 % (03/22/24 0800) Vital Signs (24h Range):  Temp:  [97.6 °F (36.4 °C)-98.2 °F (36.8 °C)] 97.9 °F (36.6 °C)  Pulse:  [39-63] 52  Resp:  [14-23] 18  SpO2:  [95 %-98 %] 97 %  BP: (112-161)/(50-86) 134/68   Weight: 51.4 kg (113 lb 6.4 oz)  Body mass index is 19.47 kg/m².  SpO2: 97 %       Intake/Output Summary (Last 24 hours) at 3/22/2024 0826  Last data filed at 3/21/2024 1215  Gross per 24 hour   Intake --   Output 300 ml   Net -300 ml     Lines/Drains/Airways       Peripheral Intravenous Line  Duration                  Peripheral IV - Single Lumen 03/21/24 0900 20 G Anterior;Left Forearm <1 day                  Significant Labs:   Recent Results (from the past 72 hour(s))   CV Ultrasound Bilateral Doppler Carotid    Collection Time: 03/21/24 12:33 PM   Result Value Ref Range    Right CCA prox sys 75 cm/s    Right CCA prox erickson 12 cm/s    Right CCA dist sys 42 cm/s    Right CCA dist erickson 7 cm/s    Right ICA prox sys 167 cm/s    Right ICA prox erickson 37 cm/s    Right ICA mid sys 171 cm/s    Right ICA mid eirckson 38 cm/s    Right ICA dist sys 38 cm/s    Right ICA dist erickson 10 cm/s    Right ECA sys 66 cm/s    Right ECA erickson 0 cm/s    Right vertebral sys 88 cm/s    Right vertebral erickson 9 cm/s    Right ICA/CCA ratio 4.07     Right Highest .00     Right Highest EDV 38.00     Right Highest CCA 75     Left CCA prox sys 81 cm/s    Left CCA prox erickson 14 cm/s    Left CCA dist sys 61 cm/s    Left CCA dist erickson 13 cm/s    Left ICA prox sys 98 cm/s    Left ICA prox erickson 25 cm/s    Left ICA mid sys 90 cm/s    Left ICA mid erickson 25 cm/s    Left ICA dist sys 76 cm/s    Left ICA dist erickson 20 cm/s    Left ECA sys 50 cm/s    Left ECA erickson 0 cm/s    Left vertebral sys 38 cm/s    Left vertebral erickson 6 cm/s    Left ICA/CCA ratio 1.61     Left Highest ICA 98.00     LT Highest EDV 25.00     Left Highest CCA 81    APTT    Collection Time: 03/21/24  1:43 PM   Result Value Ref Range    PTT 43.3 (H) 23.2 - 33.7 seconds    Protime-INR    Collection Time: 03/21/24  1:43 PM   Result Value Ref Range    PT 13.3 12.5 - 14.5 seconds    INR 1.0 <=1.3   CBC with Differential    Collection Time: 03/21/24  1:43 PM   Result Value Ref Range    WBC 8.68 4.50 - 11.50 x10(3)/mcL    RBC 3.94 (L) 4.70 - 6.10 x10(6)/mcL    Hgb 12.4 (L) 14.0 - 18.0 g/dL    Hct 36.8 (L) 42.0 - 52.0 %    MCV 93.4 80.0 - 94.0 fL    MCH 31.5 (H) 27.0 - 31.0 pg    MCHC 33.7 33.0 - 36.0 g/dL    RDW 13.3 11.5 - 17.0 %    Platelet 237 130 - 400 x10(3)/mcL    MPV 10.0 7.4 - 10.4 fL    Neut % 61.3 %    Lymph % 27.2 %    Mono % 7.0 %    Eos % 3.0 %    Basophil % 1.3 %    Lymph # 2.36 0.6 - 4.6 x10(3)/mcL    Neut # 5.32 2.1 - 9.2 x10(3)/mcL    Mono # 0.61 0.1 - 1.3 x10(3)/mcL    Eos # 0.26 0 - 0.9 x10(3)/mcL    Baso # 0.11 <=0.2 x10(3)/mcL    IG# 0.02 0 - 0.04 x10(3)/mcL    IG% 0.2 %    NRBC% 0.0 %   APTT    Collection Time: 03/21/24  8:08 PM   Result Value Ref Range    PTT 41.4 (H) 23.2 - 33.7 seconds   Comprehensive metabolic panel    Collection Time: 03/22/24  3:31 AM   Result Value Ref Range    Sodium Level 138 136 - 145 mmol/L    Potassium Level 4.2 3.5 - 5.1 mmol/L    Chloride 105 98 - 107 mmol/L    Carbon Dioxide 24 23 - 31 mmol/L    Glucose Level 99 82 - 115 mg/dL    Blood Urea Nitrogen 15.6 8.4 - 25.7 mg/dL    Creatinine 0.86 0.73 - 1.18 mg/dL    Calcium Level Total 9.3 8.8 - 10.0 mg/dL    Protein Total 6.7 5.8 - 7.6 gm/dL    Albumin Level 3.7 3.4 - 4.8 g/dL    Globulin 3.0 2.4 - 3.5 gm/dL    Albumin/Globulin Ratio 1.2 1.1 - 2.0 ratio    Bilirubin Total 0.9 <=1.5 mg/dL    Alkaline Phosphatase 77 40 - 150 unit/L    Alanine Aminotransferase 10 0 - 55 unit/L    Aspartate Aminotransferase 16 5 - 34 unit/L    eGFR >60 mls/min/1.73/m2   Magnesium    Collection Time: 03/22/24  3:31 AM   Result Value Ref Range    Magnesium Level 2.00 1.60 - 2.60 mg/dL   APTT    Collection Time: 03/22/24  3:31 AM   Result Value Ref Range    PTT 71.9 (H) 23.2 - 33.7 seconds   CBC with Differential     Collection Time: 03/22/24  3:31 AM   Result Value Ref Range    WBC 7.22 4.50 - 11.50 x10(3)/mcL    RBC 3.97 (L) 4.70 - 6.10 x10(6)/mcL    Hgb 12.6 (L) 14.0 - 18.0 g/dL    Hct 37.1 (L) 42.0 - 52.0 %    MCV 93.5 80.0 - 94.0 fL    MCH 31.7 (H) 27.0 - 31.0 pg    MCHC 34.0 33.0 - 36.0 g/dL    RDW 13.2 11.5 - 17.0 %    Platelet 209 130 - 400 x10(3)/mcL    MPV 10.0 7.4 - 10.4 fL    Neut % 49.2 %    Lymph % 36.3 %    Mono % 8.2 %    Eos % 4.7 %    Basophil % 1.5 %    Lymph # 2.62 0.6 - 4.6 x10(3)/mcL    Neut # 3.55 2.1 - 9.2 x10(3)/mcL    Mono # 0.59 0.1 - 1.3 x10(3)/mcL    Eos # 0.34 0 - 0.9 x10(3)/mcL    Baso # 0.11 <=0.2 x10(3)/mcL    IG# 0.01 0 - 0.04 x10(3)/mcL    IG% 0.1 %    NRBC% 0.0 %   EKG 12-LEAD starting tomorrow    Collection Time: 03/22/24  5:41 AM   Result Value Ref Range    QRS Duration 96 ms    [facility] QTC Calculation 434 ms     Telemetry:  SB    Physical Exam  Vitals and nursing note reviewed.   Constitutional:       Appearance: Normal appearance.   HENT:      Head: Normocephalic.      Mouth/Throat:      Mouth: Mucous membranes are moist.      Pharynx: Oropharynx is clear.   Cardiovascular:      Rate and Rhythm: Regular rhythm. Bradycardia present.      Pulses: Normal pulses.           Radial pulses are 2+ on the right side.      Heart sounds: Normal heart sounds.   Pulmonary:      Effort: Pulmonary effort is normal. No respiratory distress.      Breath sounds: Normal breath sounds.   Abdominal:      General: There is no distension.      Palpations: Abdomen is soft.      Tenderness: There is no abdominal tenderness. There is no guarding.   Musculoskeletal:         General: Normal range of motion.      Cervical back: Neck supple.      Right lower leg: No edema.      Left lower leg: No edema.   Skin:     General: Skin is warm and dry.      Comments: Right Radial Cath Site soft with no evidence of hematoma or bleeding noted.    Neurological:      General: No focal deficit present.      Mental Status: He is  alert and oriented to person, place, and time. Mental status is at baseline.   Psychiatric:         Mood and Affect: Mood normal.         Behavior: Behavior normal.         Thought Content: Thought content normal.         Judgment: Judgment normal.       Current Inpatient Medications:    Current Facility-Administered Medications:     amLODIPine tablet 10 mg, 10 mg, Oral, Daily, Miguel, Rein T, FNP    aspirin EC tablet 81 mg, 81 mg, Oral, Daily, Miguel Rein T, FNP    atorvastatin tablet 40 mg, 40 mg, Oral, Daily, Brock Young, LEXIIP    diazePAM tablet 10 mg, 10 mg, Oral, On Call Procedure, Monse Lima MD, 10 mg at 03/21/24 1021    diphenhydrAMINE capsule 50 mg, 50 mg, Oral, On Call Procedure, Monse Lima MD, 50 mg at 03/21/24 1021    heparin 25,000 units in dextrose 5% (100 units/ml) IV bolus from bag LOW INTENSITY nomogram - [facility], 60 Units/kg (Adjusted), Intravenous, PRN, Reji Sullivan MD, 2,890 Units at 03/21/24 2056    heparin 25,000 units in dextrose 5% (100 units/ml) IV bolus from bag LOW INTENSITY nomogram - [facility], 30 Units/kg (Adjusted), Intravenous, PRN, Reji Sullivan MD    heparin 25,000 units in dextrose 5% 250 mL (100 units/mL) infusion LOW INTENSITY nomogram - [facility], 0-40 Units/kg/hr (Adjusted), Intravenous, Continuous, Reji Sullivan MD, Last Rate: 7.2 mL/hr at 03/21/24 2051, 15 Units/kg/hr at 03/21/24 2051    hydrALAZINE injection 10 mg, 10 mg, Intravenous, Q4H PRN, Brock Young, LEXIIP    metoprolol succinate (TOPROL-XL) 24 hr tablet 25 mg, 25 mg, Oral, Daily, Chetna Rivera T, FNP    morphine injection 2 mg, 2 mg, Intravenous, Q1H PRN, Brock Young, FNP    nitroGLYCERIN SL tablet 0.4 mg, 0.4 mg, Sublingual, Q5 Min PRN, Chetna Rivera T, FNP    ondansetron injection 4 mg, 4 mg, Intravenous, Q12H PRN, Brock Young, FNP    pantoprazole EC tablet 40 mg, 40 mg, Oral, Daily, Chetna Rivera, FNP    sodium chloride 0.9% flush 10 mL, 10 mL, Intravenous, PRN, Janie,  MD Monse  VTE Risk Mitigation (From admission, onward)           Ordered     heparin 25,000 units in dextrose 5% (100 units/ml) IV bolus from bag LOW INTENSITY nomogram - [facility]  As needed (PRN)        Question:  Heparin Infusion Adjustment (DO NOT MODIFY ANSWER)  Answer:  [email or web address]\Images\Pharmacy\HeparinInfusions\heparin LOW INTENSITY nomogram for [facility] [id number]    03/21/24 1348     heparin 25,000 units in dextrose 5% (100 units/ml) IV bolus from bag LOW INTENSITY nomogram - [facility]  As needed (PRN)        Question:  Heparin Infusion Adjustment (DO NOT MODIFY ANSWER)  Answer:  [email or web address]\Images\Pharmacy\HeparinInfusions\heparin LOW INTENSITY nomogram for [facility] [id number]    03/21/24 1348     heparin 25,000 units in dextrose 5% 250 mL (100 units/mL) infusion LOW INTENSITY nomogram - [facility]  Continuous        Question:  Begin at (units/kg/hr)  Answer:  12    03/21/24 1402                  Assessment:   CAD (Multivessel)    - LM: 90% Distal, LAD: 70% Ostial, LCX: 40-50% Proximal, RCA: (Dominant) 80-90% Serially Calcified Lesions (EF 50%) (3.21.24)  Hypertension (BP Stable)  PAD    - REIA 70-80% Stenosis at Pelvic Brim (3.21.24)  Nicotine Dependence  ROBBI    - 50-69% MUSTAPHA (CUS 3.21.24)  Sinus Bradycardia    Plan:   Continue Aspirin 81 Mg Daily & Continue Statin  Hold Beta Blocker for now given Bradycardia  Continue Heparin Infusion Per Protocol Re: MV CAD (Left Main Disease)  CT Surgery Consulted for CABG Evaluation, Planning for Monday 3.25.24.  NTG SL PRN CP  Monitor on Tele  Patient educated on the importance of notifying nursing staff should CP Recur and persist.  Right Radial Precautions    BOUBACAR Leigh  Cardiology  [facility] - 6th Floor Medical Telemetry  03/22/2024     I personally reviewed the NP history and physical above.  See below MDM component performed by me (Jayson Oconnell MD):    CAD, multivessel and L main disease    Feeling ok, but did have some short CP for about 1 min that  resolved on its own    BP getting a little high, will add some Imdur  PAD  CVD  HTN    Consider ARB if BP needs it  Smoker  Evans, BB on hold    Plan:  Cont ASA and Heparin  CABG eval in progress, planning for Monday  Imdur 30 mg daily, first now

## 2024-03-22 NOTE — PLAN OF CARE
03/22/24 0911   Discharge Assessment   Assessment Type Discharge Planning Assessment   Confirmed/corrected address, phone number and insurance Yes   Confirmed Demographics Correct on Facesheet   Source of Information patient   When was your last doctors appointment? 03/19/24   Communicated NITHYA with patient/caregiver Date not available/Unable to determine   Reason For Admission CP, multivessel CAD   People in Home spouse;grandchild(oli)   Do you expect to return to your current living situation? Yes   Do you have help at home or someone to help you manage your care at home? Yes   Who are your caregiver(s) and their phone number(s)? Doris [phone]   Prior to hospitilization cognitive status: Alert/Oriented   Current cognitive status: Alert/Oriented   Dressing/Bathing Difficulty no   Home Accessibility stairs to enter home   Number of Stairs, Main Entrance three   Stair Railings, Main Entrance railings on both sides of stairs   Home Layout Able to live on 1st floor   Equipment Currently Used at Home none   Readmission within 30 days? No   Patient currently being followed by outpatient case management? No   Do you currently have service(s) that help you manage your care at home? No   Do you take prescription medications? Yes   Do you have any problems affording any of your prescribed medications? No   Is the patient taking medications as prescribed? yes   Who is going to help you get home at discharge? family   How do you get to doctors appointments? family or friend will provide   Are you on dialysis? No   Do you take coumadin? No   Discharge Plan A Home Health   Discharge Plan B Home Health   DME Needed Upon Discharge  other (see comments)  (tbd)   Discharge Plan discussed with: Patient;Adult children   Transition of Care Barriers None   Financial Resource Strain   How hard is it for you to pay for the very basics like food, housing, medical care, and heating? Not very   Housing Stability   In the last 12  months, was there a time when you were not able to pay the mortgage or rent on time? N   In the last 12 months, was there a time when you did not have a steady place to sleep or slept in a shelter (including now)? N   Transportation Needs   In the past 12 months, has lack of transportation kept you from medical appointments or from getting medications? no   In the past 12 months, has lack of transportation kept you from meetings, work, or from getting things needed for daily living? No   Food Insecurity   Within the past 12 months, you worried that your food would run out before you got the money to buy more. Never true   Within the past 12 months, the food you bought just didn't last and you didn't have money to get more. Never true   Social Connections   In a typical week, how many times do you talk on the phone with family, friends, or neighbors? More than 3   How often do you get together with friends or relatives? More than 3   How often do you attend Pentecostal or Sikhism services? Never   Are you , , , , never , or living with a partner?    Alcohol Use   Q1: How often do you have a drink containing alcohol? Never   Q2: How many drinks containing alcohol do you have on a typical day when you are drinking? None   Q3: How often do you have six or more drinks on one occasion? Never   OTHER   Name(s) of People in Home CindiDaniel lucia     Pharmacy: [facility] in [place]  PCP: Manolo Waller NP ([place]) [phone]  Pt states he is scheduled for CABG on 5/25/24.  Pt Choice for HH given to pt/dgt to review.

## 2024-03-23 LAB
APTT PPP: 52.5 SECONDS (ref 23.2–33.7)
APTT PPP: 68.3 SECONDS (ref 23.2–33.7)
APTT PPP: 69.2 SECONDS (ref 23.2–33.7)
BASOPHILS # BLD AUTO: 0.1 X10(3)/MCL
BASOPHILS NFR BLD AUTO: 1.2 %
EOSINOPHIL # BLD AUTO: 0.21 X10(3)/MCL (ref 0–0.9)
EOSINOPHIL NFR BLD AUTO: 2.6 %
ERYTHROCYTE [DISTWIDTH] IN BLOOD BY AUTOMATED COUNT: 13.3 % (ref 11.5–17)
HCT VFR BLD AUTO: 36.2 % (ref 42–52)
HGB BLD-MCNC: 12.2 G/DL (ref 14–18)
IMM GRANULOCYTES # BLD AUTO: 0.02 X10(3)/MCL (ref 0–0.04)
IMM GRANULOCYTES NFR BLD AUTO: 0.2 %
LYMPHOCYTES # BLD AUTO: 1.69 X10(3)/MCL (ref 0.6–4.6)
LYMPHOCYTES NFR BLD AUTO: 20.7 %
MCH RBC QN AUTO: 31.6 PG (ref 27–31)
MCHC RBC AUTO-ENTMCNC: 33.7 G/DL (ref 33–36)
MCV RBC AUTO: 93.8 FL (ref 80–94)
MONOCYTES # BLD AUTO: 0.56 X10(3)/MCL (ref 0.1–1.3)
MONOCYTES NFR BLD AUTO: 6.9 %
NEUTROPHILS # BLD AUTO: 5.57 X10(3)/MCL (ref 2.1–9.2)
NEUTROPHILS NFR BLD AUTO: 68.4 %
NRBC BLD AUTO-RTO: 0 %
OHS QRS DURATION: 72 MS
OHS QTC CALCULATION: 426 MS
PLATELET # BLD AUTO: 250 X10(3)/MCL (ref 130–400)
PMV BLD AUTO: 10.1 FL (ref 7.4–10.4)
RBC # BLD AUTO: 3.86 X10(6)/MCL (ref 4.7–6.1)
WBC # SPEC AUTO: 8.15 X10(3)/MCL (ref 4.5–11.5)

## 2024-03-23 PROCEDURE — 63600175 PHARM REV CODE 636 W HCPCS: Performed by: THORACIC SURGERY (CARDIOTHORACIC VASCULAR SURGERY)

## 2024-03-23 PROCEDURE — 25000003 PHARM REV CODE 250: Performed by: NURSE PRACTITIONER

## 2024-03-23 PROCEDURE — 21400001 HC TELEMETRY ROOM

## 2024-03-23 PROCEDURE — 85730 THROMBOPLASTIN TIME PARTIAL: CPT | Performed by: INTERNAL MEDICINE

## 2024-03-23 PROCEDURE — 85025 COMPLETE CBC W/AUTO DIFF WBC: CPT | Performed by: THORACIC SURGERY (CARDIOTHORACIC VASCULAR SURGERY)

## 2024-03-23 RX ADMIN — AMLODIPINE BESYLATE 10 MG: 5 TABLET ORAL at 08:03

## 2024-03-23 RX ADMIN — HEPARIN SODIUM 17 UNITS/KG/HR: 10000 INJECTION, SOLUTION INTRAVENOUS at 07:03

## 2024-03-23 RX ADMIN — PANTOPRAZOLE SODIUM 40 MG: 40 TABLET, DELAYED RELEASE ORAL at 08:03

## 2024-03-23 RX ADMIN — ATORVASTATIN CALCIUM 40 MG: 40 TABLET, FILM COATED ORAL at 08:03

## 2024-03-23 RX ADMIN — Medication 81 MG: at 08:03

## 2024-03-23 NOTE — PROGRESS NOTES
[facility] - 6th Floor [facility] Telemetry    Cardiology  Progress Note    Patient Name: Ata Pickens  MRN: [id number]  Admission Date: 3/21/2024  Hospital Length of Stay: 2 days  Code Status: No Order   Attending Physician: Monse Lima MD   Primary Care Physician: Lorraine, Primary Doctor  Expected Discharge Date:   Principal Problem:<principal problem not specified>    Subjective:   Chief Complaint:  Outpatient Cath- MV CAD- CABG Evaluation     HPI:   Mr. Pickens is a 68 year old male, known to Dr. Lima, who presented to the hospital and underwent elective coronary angiogram due to diagnosis of abnormal stress test which was performed in the outpatient setting due to reported history of chest pain, SOB, and fatigue with minimal exertion. He underwent cath on 3.21.24 revealing MV CAD including 90% distal LM/Ostial LAD Disease. Patient's EF on LV Gram noted to be 50%. Patient was admitted to [facility]. CT Surgical services consulted for CABG evaluation.    Hospital Course:  3.22.24: NAD Noted. SB on Tele. BP Stable. CP This AM, Went away on its own. No CP Currently. Right Radial Site soft.  3.23.24: NAD Noted. SR on Tele. BP Stable. On Heparin Infusion.     PMH: Hypertension, Claudication, SOB, CP, Chronic Tobacco Use  PSH: Wrist Surgery, Colostomy Closure  Family History: Father- Lung Cancer, Mother- Lung Cancer, Brother- Cardiac Pacemaker  Social History: Tobacco- Active Smoker, Alcohol- Negative, Substance Abuse- Negative     Previous Cardiac Diagnostics:   Coronary Angiogram (3.21.24):  Coronary findings:  Dominance: right   Left main:  Calcified distal 90% stenosis extending ostium of the LAD.    Left anterior descending artery:  Type three-vessel with calcified ostial stenosis of at least 70%.    Circumflex artery:  Nondominant calcified vessel with proximal 40-50% stenosis.  The circumflex gives rise to a large patent bifurcating OM.  Right coronary artery:  Calcified dominant vessel  with serial heavily calcified segment stenosis of up to 80-90%.  The RCA terminates in a moderate bifurcating PDA and a small PL segment.    Selective LIMA angiogram:  Large widely patent vessel  Aortic root angiogram:  Calcified Type 3 bovine arch with patent innominate and proximal ICA bilateral.  Abdominal angiogram: No AAA.  Left iliac system patent.  Left common femoral artery patent proximally.  Right common and internal iliac patent.  Right external iliac with 70-80% stenosis at the pelvic brim.  Right common femoral occluded.  Left ventriculography:  EF- 50%.    Hemodynamics:LV/AO= 0 mmHg                      LVEDP= 13-16 mmHg       Carotid US (3.21.24):  The right internal carotid artery demonstrated 50-69% stenosis.   The left internal carotid artery demonstrated less than 50% stenosis.  Bilateral vertebral arteries were patent with antegrade flow.     Echocardiogram (3.5.24):  The left ventricle is decreased in size. Global left ventricular systolic function is borderline normal. The left ventricular ejection fraction is 50%. The left ventricle diastolic function is impaired (Grade I) with normal left atrial pressure.   The right atrium is moderately enlarged ~5.1 cm.  Mild (1+) tricuspid and trace mitral regurgitation.  The pulmonary artery systolic pressure is 23 mmHg.  The study quality is below average.      CV US Arterial Lower Extremities (3.5.24):  The study quality is good.   The right proximal superficial femoral artery is occluded.   The right mid and distal superficial femoral, posterior tibial, peroneal, anterior tibial, and dorsalis pedis arteries exhibit poor, diminished perfusion via collateral flow.  The right lower extremity arteries exhibit mono-phasic waveforms.  The left external iliac and common femoral arteries exhibit mono-phasic waveforms.  The remaining arteries of the left lower extremity exhibit bi-phasic waveforms.      PET (3.1.24):  This is an abnormal perfusion study. Study  is consistent with significant anterior  ischemia.   This scan is suggestive of moderate to high risk for future cardiovascular events.   Large partially reversible perfusion abnormality of severe intensity in the anterior apical region. Large partially reversible perfusion abnormality of severe intensity in the anterior septal region. Small reversible perfusion abnormality of severe intensity in the apical lateral segment.   The left ventricular cavity is noted to be normal on the stress studies. The stress left ventricular ejection fraction was calculated to be 37% and left ventricular global function is moderately reduced. The rest left ventricular cavity is noted to be normal. The rest left ventricular ejection fraction was calculated to be 52% and rest left ventricular global function is normal.   Hypokinesis of apical anterior segment is noted only in the stress studies which is suggestive of new ischemia. Persistent hypokinesis of the septal region is noted in both rest and stress studies. When compared to the resting ejection fraction (52%), the stress ejection fraction (37%) has decreased.   Transient ischemic dilatation is present and has been described as a marker for high risk coronary artery disease. It has also been described in microvascular disease, hypertensive heart disease as well as cardiac deconditioning.   The study quality is good.   There was no rise in myocardial blood flow between rest and stress.  Global myocardial blood flow reserve was 0.83.  Myocardial blood flow reserve is globally abnormal, placing the patient at a higher coronary event risk.    Review of Systems   Cardiovascular:  Negative for chest pain.   Respiratory:  Negative for shortness of breath.      Objective:     Vital Signs (Most Recent):  Temp: 98 °F (36.7 °C) (03/23/24 0810)  Pulse: (!) 59 (03/23/24 0810)  Resp: 16 (03/23/24 0810)  BP: 116/69 (03/23/24 0810)  SpO2: 97 % (03/23/24 0810) Vital Signs (24h Range):  Temp:   [96.9 °F (36.1 °C)-98.1 °F (36.7 °C)] 98 °F (36.7 °C)  Pulse:  [51-81] 59  Resp:  [16-18] 16  SpO2:  [96 %-100 %] 97 %  BP: (112-198)/(61-99) 116/69   Weight: 51.4 kg (113 lb 6.4 oz)  Body mass index is 19.47 kg/m².  SpO2: 97 %       Intake/Output Summary (Last 24 hours) at 3/23/2024 1021  Last data filed at 3/23/2024 0440  Gross per 24 hour   Intake 642 ml   Output 900 ml   Net -258 ml       Lines/Drains/Airways       Peripheral Intravenous Line  Duration                  Peripheral IV - Single Lumen 03/21/24 0900 20 G Anterior;Left Forearm 2 days         Peripheral IV - Single Lumen 03/22/24 2100 20 G Left Forearm <1 day                  Significant Labs:   Recent Results (from the past 72 hour(s))   CV Ultrasound Bilateral Doppler Carotid    Collection Time: 03/21/24 12:33 PM   Result Value Ref Range    Right CCA prox sys 75 cm/s    Right CCA prox erickson 12 cm/s    Right CCA dist sys 42 cm/s    Right CCA dist erickson 7 cm/s    Right ICA prox sys 167 cm/s    Right ICA prox erickson 37 cm/s    Right ICA mid sys 171 cm/s    Right ICA mid erickson 38 cm/s    Right ICA dist sys 38 cm/s    Right ICA dist erickson 10 cm/s    Right ECA sys 66 cm/s    Right ECA erickson 0 cm/s    Right vertebral sys 88 cm/s    Right vertebral erickson 9 cm/s    Right ICA/CCA ratio 4.07     Right Highest .00     Right Highest EDV 38.00     Right Highest CCA 75     Left CCA prox sys 81 cm/s    Left CCA prox erickson 14 cm/s    Left CCA dist sys 61 cm/s    Left CCA dist erickson 13 cm/s    Left ICA prox sys 98 cm/s    Left ICA prox erickson 25 cm/s    Left ICA mid sys 90 cm/s    Left ICA mid erickson 25 cm/s    Left ICA dist sys 76 cm/s    Left ICA dist erickson 20 cm/s    Left ECA sys 50 cm/s    Left ECA erickson 0 cm/s    Left vertebral sys 38 cm/s    Left vertebral erickson 6 cm/s    Left ICA/CCA ratio 1.61     Left Highest ICA 98.00     LT Highest EDV 25.00     Left Highest CCA 81    APTT    Collection Time: 03/21/24  1:43 PM   Result Value Ref Range    PTT 43.3 (H) 23.2 - 33.7  seconds   Protime-INR    Collection Time: 03/21/24  1:43 PM   Result Value Ref Range    PT 13.3 12.5 - 14.5 seconds    INR 1.0 <=1.3   CBC with Differential    Collection Time: 03/21/24  1:43 PM   Result Value Ref Range    WBC 8.68 4.50 - 11.50 x10(3)/mcL    RBC 3.94 (L) 4.70 - 6.10 x10(6)/mcL    Hgb 12.4 (L) 14.0 - 18.0 g/dL    Hct 36.8 (L) 42.0 - 52.0 %    MCV 93.4 80.0 - 94.0 fL    MCH 31.5 (H) 27.0 - 31.0 pg    MCHC 33.7 33.0 - 36.0 g/dL    RDW 13.3 11.5 - 17.0 %    Platelet 237 130 - 400 x10(3)/mcL    MPV 10.0 7.4 - 10.4 fL    Neut % 61.3 %    Lymph % 27.2 %    Mono % 7.0 %    Eos % 3.0 %    Basophil % 1.3 %    Lymph # 2.36 0.6 - 4.6 x10(3)/mcL    Neut # 5.32 2.1 - 9.2 x10(3)/mcL    Mono # 0.61 0.1 - 1.3 x10(3)/mcL    Eos # 0.26 0 - 0.9 x10(3)/mcL    Baso # 0.11 <=0.2 x10(3)/mcL    IG# 0.02 0 - 0.04 x10(3)/mcL    IG% 0.2 %    NRBC% 0.0 %   APTT    Collection Time: 03/21/24  8:08 PM   Result Value Ref Range    PTT 41.4 (H) 23.2 - 33.7 seconds   Comprehensive metabolic panel    Collection Time: 03/22/24  3:31 AM   Result Value Ref Range    Sodium Level 138 136 - 145 mmol/L    Potassium Level 4.2 3.5 - 5.1 mmol/L    Chloride 105 98 - 107 mmol/L    Carbon Dioxide 24 23 - 31 mmol/L    Glucose Level 99 82 - 115 mg/dL    Blood Urea Nitrogen 15.6 8.4 - 25.7 mg/dL    Creatinine 0.86 0.73 - 1.18 mg/dL    Calcium Level Total 9.3 8.8 - 10.0 mg/dL    Protein Total 6.7 5.8 - 7.6 gm/dL    Albumin Level 3.7 3.4 - 4.8 g/dL    Globulin 3.0 2.4 - 3.5 gm/dL    Albumin/Globulin Ratio 1.2 1.1 - 2.0 ratio    Bilirubin Total 0.9 <=1.5 mg/dL    Alkaline Phosphatase 77 40 - 150 unit/L    Alanine Aminotransferase 10 0 - 55 unit/L    Aspartate Aminotransferase 16 5 - 34 unit/L    eGFR >60 mls/min/1.73/m2   Magnesium    Collection Time: 03/22/24  3:31 AM   Result Value Ref Range    Magnesium Level 2.00 1.60 - 2.60 mg/dL   APTT    Collection Time: 03/22/24  3:31 AM   Result Value Ref Range    PTT 71.9 (H) 23.2 - 33.7 seconds   CBC with  Differential    Collection Time: 03/22/24  3:31 AM   Result Value Ref Range    WBC 7.22 4.50 - 11.50 x10(3)/mcL    RBC 3.97 (L) 4.70 - 6.10 x10(6)/mcL    Hgb 12.6 (L) 14.0 - 18.0 g/dL    Hct 37.1 (L) 42.0 - 52.0 %    MCV 93.5 80.0 - 94.0 fL    MCH 31.7 (H) 27.0 - 31.0 pg    MCHC 34.0 33.0 - 36.0 g/dL    RDW 13.2 11.5 - 17.0 %    Platelet 209 130 - 400 x10(3)/mcL    MPV 10.0 7.4 - 10.4 fL    Neut % 49.2 %    Lymph % 36.3 %    Mono % 8.2 %    Eos % 4.7 %    Basophil % 1.5 %    Lymph # 2.62 0.6 - 4.6 x10(3)/mcL    Neut # 3.55 2.1 - 9.2 x10(3)/mcL    Mono # 0.59 0.1 - 1.3 x10(3)/mcL    Eos # 0.34 0 - 0.9 x10(3)/mcL    Baso # 0.11 <=0.2 x10(3)/mcL    IG# 0.01 0 - 0.04 x10(3)/mcL    IG% 0.1 %    NRBC% 0.0 %   EKG 12-LEAD starting tomorrow    Collection Time: 03/22/24  5:41 AM   Result Value Ref Range    QRS Duration 96 ms    [facility] QTC Calculation 434 ms   APTT    Collection Time: 03/22/24  9:51 AM   Result Value Ref Range    PTT 65.9 (H) 23.2 - 33.7 seconds   EKG 12-lead    Collection Time: 03/22/24  7:21 PM   Result Value Ref Range    QRS Duration 72 ms    [facility] QTC Calculation 426 ms   APTT    Collection Time: 03/23/24  7:53 AM   Result Value Ref Range    PTT 52.5 (H) 23.2 - 33.7 seconds   CBC with Differential    Collection Time: 03/23/24  7:53 AM   Result Value Ref Range    WBC 8.15 4.50 - 11.50 x10(3)/mcL    RBC 3.86 (L) 4.70 - 6.10 x10(6)/mcL    Hgb 12.2 (L) 14.0 - 18.0 g/dL    Hct 36.2 (L) 42.0 - 52.0 %    MCV 93.8 80.0 - 94.0 fL    MCH 31.6 (H) 27.0 - 31.0 pg    MCHC 33.7 33.0 - 36.0 g/dL    RDW 13.3 11.5 - 17.0 %    Platelet 250 130 - 400 x10(3)/mcL    MPV 10.1 7.4 - 10.4 fL    Neut % 68.4 %    Lymph % 20.7 %    Mono % 6.9 %    Eos % 2.6 %    Basophil % 1.2 %    Lymph # 1.69 0.6 - 4.6 x10(3)/mcL    Neut # 5.57 2.1 - 9.2 x10(3)/mcL    Mono # 0.56 0.1 - 1.3 x10(3)/mcL    Eos # 0.21 0 - 0.9 x10(3)/mcL    Baso # 0.10 <=0.2 x10(3)/mcL    IG# 0.02 0 - 0.04 x10(3)/mcL    IG% 0.2 %    NRBC% 0.0 %     Telemetry:   SB    Physical Exam  Vitals and nursing note reviewed.   Constitutional:       Appearance: Normal appearance.   HENT:      Head: Normocephalic.      Mouth/Throat:      Mouth: Mucous membranes are moist.      Pharynx: Oropharynx is clear.   Cardiovascular:      Rate and Rhythm: Normal rate and regular rhythm.      Pulses: Normal pulses.           Radial pulses are 2+ on the right side.      Heart sounds: Normal heart sounds.   Pulmonary:      Effort: Pulmonary effort is normal. No respiratory distress.      Breath sounds: Normal breath sounds.   Abdominal:      General: There is no distension.      Palpations: Abdomen is soft.      Tenderness: There is no abdominal tenderness. There is no guarding.   Musculoskeletal:         General: Normal range of motion.      Cervical back: Neck supple.      Right lower leg: No edema.      Left lower leg: No edema.   Skin:     General: Skin is warm and dry.      Comments: Right Radial Cath Site soft with no evidence of hematoma or bleeding noted.    Neurological:      General: No focal deficit present.      Mental Status: He is alert and oriented to person, place, and time. Mental status is at baseline.   Psychiatric:         Mood and Affect: Mood normal.         Behavior: Behavior normal.         Thought Content: Thought content normal.         Judgment: Judgment normal.       Current Inpatient Medications:    Current Facility-Administered Medications:     amLODIPine tablet 10 mg, 10 mg, Oral, Daily, Chetna Rivera T, FNP, 10 mg at 03/23/24 0843    aspirin EC tablet 81 mg, 81 mg, Oral, Daily, Chetna Rivera T, FNP, 81 mg at 03/23/24 0843    atorvastatin tablet 40 mg, 40 mg, Oral, Daily, Brock Young, FNP, 40 mg at 03/23/24 0843    diazePAM tablet 10 mg, 10 mg, Oral, On Call Procedure, Monse Lima MD, 10 mg at 03/21/24 1021    diphenhydrAMINE capsule 50 mg, 50 mg, Oral, On Call Procedure, Monse Lima MD, 50 mg at 03/21/24 1021    heparin 25,000 units in dextrose  5% (100 units/ml) IV bolus from bag LOW INTENSITY nomogram - [facility], 60 Units/kg (Adjusted), Intravenous, PRN, Reji Sullivan MD, 2,890 Units at 03/21/24 2056    heparin 25,000 units in dextrose 5% (100 units/ml) IV bolus from bag LOW INTENSITY nomogram - [facility], 30 Units/kg (Adjusted), Intravenous, PRN, Reji Sullivan MD, 1,450 Units at 03/23/24 0846    heparin 25,000 units in dextrose 5% 250 mL (100 units/mL) infusion LOW INTENSITY nomogram - [facility], 0-40 Units/kg/hr (Adjusted), Intravenous, Continuous, Reji Sullivan MD, Last Rate: 8.2 mL/hr at 03/23/24 0857, 17 Units/kg/hr at 03/23/24 0857    hydrALAZINE injection 10 mg, 10 mg, Intravenous, Q4H PRN, Chetna Rivera, FNP, 10 mg at 03/22/24 1903    morphine injection 2 mg, 2 mg, Intravenous, Q1H PRN, Brock Young, LEXIIP    nitroGLYCERIN in 5 % dextrose 50 mg/250 mL (200 mcg/mL) infusion, 0-400 mcg/min, Intravenous, Continuous, Ba Alves, NP, Last Rate: 1.5 mL/hr at 03/22/24 2045, 5 mcg/min at 03/22/24 2045    nitroGLYCERIN SL tablet 0.4 mg, 0.4 mg, Sublingual, Q5 Min PRN, Chetna Rivera, LEXIIP, 0.4 mg at 03/22/24 1900    ondansetron injection 4 mg, 4 mg, Intravenous, Q12H PRN, Brock Young, FNP    pantoprazole EC tablet 40 mg, 40 mg, Oral, Daily, Chetna Rivera, LEXIIP, 40 mg at 03/23/24 0843    sodium chloride 0.9% flush 10 mL, 10 mL, Intravenous, PRN, Monse Liam MD  VTE Risk Mitigation (From admission, onward)           Ordered     heparin 25,000 units in dextrose 5% (100 units/ml) IV bolus from bag LOW INTENSITY nomogram - [facility]  As needed (PRN)        Question:  Heparin Infusion Adjustment (DO NOT MODIFY ANSWER)  Answer:  [email or web address]\Images\Pharmacy\HeparinInfusions\heparin LOW INTENSITY nomogram for [facility] [id number]    03/21/24 1348     heparin 25,000 units in dextrose 5% (100 units/ml) IV bolus from bag LOW INTENSITY nomogram - [facility]  As needed (PRN)        Question:  Heparin Infusion Adjustment (DO NOT MODIFY ANSWER)  Answer:   \\[email or web address]\epic\Images\Pharmacy\HeparinInfusions\heparin LOW INTENSITY nomogram for [facility] [id number].pdf    03/21/24 1348     heparin 25,000 units in dextrose 5% 250 mL (100 units/mL) infusion LOW INTENSITY nomogram - [facility]  Continuous        Question:  Begin at (units/kg/hr)  Answer:  12    03/21/24 1402                  I,Albino Krishnan MD,performed the substantive portion of this visit. I had a face-to-face time with the patient on 3/23/24. I reviewed and agree with the nurse practitioner's history, physical exam.     Medical decision making:        Assessment/Plan:   CAD (Multivessel)    - LM: 90% Distal, LAD: 70% Ostial, LCX: 40-50% Proximal, RCA: (Dominant) 80-90% Serially Calcified Lesions (EF 50%) (3.21.24)  Hypertension (BP Stable)  PAD    - REIA 70-80% Stenosis at Pelvic Brim (3.21.24)  Nicotine Dependence  ROBBI    - 50-69% MUSTAPHA (CUS 3.21.24)  Intermittent Sinus Bradycardia (Beta Blocker on Hold) - may improve after intervention    Continue Aspirin 81 Mg Daily & Continue Statin & Imdur  Holding Beta Blocker for now given Bradycardia  Continue Heparin Infusion Per Protocol Re: MV CAD (Left Main Disease)  NTG SL PRN CP  Monitor on Tele  Patient educated on the importance of notifying nursing staff should CP Recur and persist.  CT Surgery Planning for Monday 3.25.24.    BOUBACAR Leigh  Cardiology  [facility] - 6th Floor Medical Telemetry  03/23/2024

## 2024-03-24 LAB
ABORH RETYPE: NORMAL
ANION GAP SERPL CALC-SCNC: 10 MEQ/L
APPEARANCE UR: CLEAR
APTT PPP: 69.1 SECONDS (ref 23.2–33.7)
BACTERIA #/AREA URNS AUTO: NORMAL /HPF
BASOPHILS # BLD AUTO: 0.1 X10(3)/MCL
BASOPHILS NFR BLD AUTO: 1.2 %
BILIRUB UR QL STRIP.AUTO: NEGATIVE
BUN SERPL-MCNC: 16.2 MG/DL (ref 8.4–25.7)
CALCIUM SERPL-MCNC: 9.9 MG/DL (ref 8.8–10)
CHLORIDE SERPL-SCNC: 104 MMOL/L (ref 98–107)
CO2 SERPL-SCNC: 26 MMOL/L (ref 23–31)
COLOR UR AUTO: NORMAL
CREAT SERPL-MCNC: 0.87 MG/DL (ref 0.73–1.18)
CREAT/UREA NIT SERPL: 19
EOSINOPHIL # BLD AUTO: 0.3 X10(3)/MCL (ref 0–0.9)
EOSINOPHIL NFR BLD AUTO: 3.5 %
ERYTHROCYTE [DISTWIDTH] IN BLOOD BY AUTOMATED COUNT: 13.2 % (ref 11.5–17)
GFR SERPLBLD CREATININE-BSD FMLA CKD-EPI: >60 MLS/MIN/1.73/M2
GLUCOSE SERPL-MCNC: 116 MG/DL (ref 82–115)
GLUCOSE UR QL STRIP.AUTO: NORMAL
GROUP & RH: NORMAL
HCT VFR BLD AUTO: 37.7 % (ref 42–52)
HGB BLD-MCNC: 12.7 G/DL (ref 14–18)
IMM GRANULOCYTES # BLD AUTO: 0.03 X10(3)/MCL (ref 0–0.04)
IMM GRANULOCYTES NFR BLD AUTO: 0.3 %
INDIRECT COOMBS: NORMAL
KETONES UR QL STRIP.AUTO: NEGATIVE
LEUKOCYTE ESTERASE UR QL STRIP.AUTO: NEGATIVE
LYMPHOCYTES # BLD AUTO: 1.95 X10(3)/MCL (ref 0.6–4.6)
LYMPHOCYTES NFR BLD AUTO: 22.5 %
MAGNESIUM SERPL-MCNC: 1.9 MG/DL (ref 1.6–2.6)
MCH RBC QN AUTO: 31.3 PG (ref 27–31)
MCHC RBC AUTO-ENTMCNC: 33.7 G/DL (ref 33–36)
MCV RBC AUTO: 92.9 FL (ref 80–94)
MONOCYTES # BLD AUTO: 0.67 X10(3)/MCL (ref 0.1–1.3)
MONOCYTES NFR BLD AUTO: 7.7 %
MRSA PCR SCRN (OHS): NOT DETECTED
NEUTROPHILS # BLD AUTO: 5.63 X10(3)/MCL (ref 2.1–9.2)
NEUTROPHILS NFR BLD AUTO: 64.8 %
NITRITE UR QL STRIP.AUTO: NEGATIVE
NRBC BLD AUTO-RTO: 0 %
OHS QRS DURATION: 92 MS
OHS QTC CALCULATION: 438 MS
PH UR STRIP.AUTO: 6.5 [PH]
PLATELET # BLD AUTO: 265 X10(3)/MCL (ref 130–400)
PMV BLD AUTO: 10 FL (ref 7.4–10.4)
POTASSIUM SERPL-SCNC: 4.2 MMOL/L (ref 3.5–5.1)
PROT UR QL STRIP.AUTO: NEGATIVE
RBC # BLD AUTO: 4.06 X10(6)/MCL (ref 4.7–6.1)
RBC #/AREA URNS AUTO: NORMAL /HPF
RBC UR QL AUTO: NEGATIVE
SODIUM SERPL-SCNC: 140 MMOL/L (ref 136–145)
SP GR UR STRIP.AUTO: 1.01 (ref 1–1.03)
SPECIMEN OUTDATE: NORMAL
SQUAMOUS #/AREA URNS LPF: NORMAL /HPF
UROBILINOGEN UR STRIP-ACNC: NORMAL
WBC # SPEC AUTO: 8.68 X10(3)/MCL (ref 4.5–11.5)
WBC #/AREA URNS AUTO: NORMAL /HPF

## 2024-03-24 PROCEDURE — 93005 ELECTROCARDIOGRAM TRACING: CPT

## 2024-03-24 PROCEDURE — 83735 ASSAY OF MAGNESIUM: CPT | Performed by: INTERNAL MEDICINE

## 2024-03-24 PROCEDURE — 87641 MR-STAPH DNA AMP PROBE: CPT | Performed by: THORACIC SURGERY (CARDIOTHORACIC VASCULAR SURGERY)

## 2024-03-24 PROCEDURE — 85730 THROMBOPLASTIN TIME PARTIAL: CPT | Performed by: INTERNAL MEDICINE

## 2024-03-24 PROCEDURE — 63600175 PHARM REV CODE 636 W HCPCS: Performed by: THORACIC SURGERY (CARDIOTHORACIC VASCULAR SURGERY)

## 2024-03-24 PROCEDURE — 81001 URINALYSIS AUTO W/SCOPE: CPT | Performed by: THORACIC SURGERY (CARDIOTHORACIC VASCULAR SURGERY)

## 2024-03-24 PROCEDURE — 80048 BASIC METABOLIC PNL TOTAL CA: CPT | Performed by: INTERNAL MEDICINE

## 2024-03-24 PROCEDURE — 25000003 PHARM REV CODE 250: Performed by: NURSE PRACTITIONER

## 2024-03-24 PROCEDURE — 21400001 HC TELEMETRY ROOM

## 2024-03-24 PROCEDURE — 99233 SBSQ HOSP IP/OBS HIGH 50: CPT | Mod: 57,,,

## 2024-03-24 PROCEDURE — 63600175 PHARM REV CODE 636 W HCPCS: Mod: JZ,JG

## 2024-03-24 PROCEDURE — 86850 RBC ANTIBODY SCREEN: CPT | Performed by: THORACIC SURGERY (CARDIOTHORACIC VASCULAR SURGERY)

## 2024-03-24 PROCEDURE — 85025 COMPLETE CBC W/AUTO DIFF WBC: CPT | Performed by: INTERNAL MEDICINE

## 2024-03-24 PROCEDURE — 86923 COMPATIBILITY TEST ELECTRIC: CPT

## 2024-03-24 RX ORDER — HEPARIN 100 UNIT/ML
5 SYRINGE INTRAVENOUS
Status: DISCONTINUED | OUTPATIENT
Start: 2024-03-24 | End: 2024-04-05 | Stop reason: HOSPADM

## 2024-03-24 RX ORDER — MORPHINE SULFATE 4 MG/ML
2 INJECTION, SOLUTION INTRAMUSCULAR; INTRAVENOUS EVERY 4 HOURS PRN
Status: DISCONTINUED | OUTPATIENT
Start: 2024-03-24 | End: 2024-04-05 | Stop reason: HOSPADM

## 2024-03-24 RX ORDER — LIDOCAINE HYDROCHLORIDE 20 MG/ML
JELLY TOPICAL
Status: DISCONTINUED | OUTPATIENT
Start: 2024-03-24 | End: 2024-03-24

## 2024-03-24 RX ORDER — MUPIROCIN 20 MG/G
OINTMENT TOPICAL
Status: DISCONTINUED | OUTPATIENT
Start: 2024-03-25 | End: 2024-04-05 | Stop reason: HOSPADM

## 2024-03-24 RX ORDER — LIDOCAINE HYDROCHLORIDE 10 MG/ML
2 INJECTION, SOLUTION EPIDURAL; INFILTRATION; INTRACAUDAL; PERINEURAL ONCE
Status: DISCONTINUED | OUTPATIENT
Start: 2024-03-24 | End: 2024-03-25

## 2024-03-24 RX ADMIN — HEPARIN SODIUM 17 UNITS/KG/HR: 10000 INJECTION, SOLUTION INTRAVENOUS at 08:03

## 2024-03-24 RX ADMIN — MORPHINE SULFATE 2 MG: 4 INJECTION, SOLUTION INTRAMUSCULAR; INTRAVENOUS at 06:03

## 2024-03-24 RX ADMIN — Medication 81 MG: at 09:03

## 2024-03-24 RX ADMIN — AMLODIPINE BESYLATE 10 MG: 5 TABLET ORAL at 09:03

## 2024-03-24 RX ADMIN — ATORVASTATIN CALCIUM 40 MG: 40 TABLET, FILM COATED ORAL at 09:03

## 2024-03-24 RX ADMIN — PANTOPRAZOLE SODIUM 40 MG: 40 TABLET, DELAYED RELEASE ORAL at 09:03

## 2024-03-24 NOTE — PROGRESS NOTES
CT SURGERY PROGRESS NOTE  Ata Pickens  68 y.o.  1955    Patients Procedure: Procedure(s) (LRB):  Left heart cath (Left)  Peripheral angiography (N/A)    Subjective  Interval History: Patient resting comfortably in bed, family member at bedside. No distress noted, VSS, NSR on tele.  He denies any shortness of breath, chest pain, or palpitations. Nitro and heparin drips infusing.     Review of Systems   Constitutional:  Negative for chills, fever and malaise/fatigue.   Respiratory:  Negative for cough, shortness of breath and wheezing.    Cardiovascular:  Negative for chest pain, palpitations and leg swelling.   Gastrointestinal:  Negative for nausea and vomiting.       Medication List  Infusions   heparin (porcine) in D5W 17 Units/kg/hr (03/23/24 1930)    nitroGLYCERIN 15 mcg/min (03/24/24 1426)     Scheduled   amLODIPine  10 mg Oral Daily    aspirin  81 mg Oral Daily    atorvastatin  40 mg Oral Daily    pantoprazole  40 mg Oral Daily       Objective:  Recent Vitals:  Temp:  [97.4 °F (36.3 °C)-98.7 °F (37.1 °C)] 97.4 °F (36.3 °C)  Pulse:  [] 75  Resp:  [18-20] 18  SpO2:  [95 %-97 %] 95 %  BP: (117-184)/(69-89) 145/80    Physical Exam  Constitutional:       General: He is not in acute distress.     Appearance: He is not ill-appearing.   HENT:      Head: Normocephalic and atraumatic.   Cardiovascular:      Rate and Rhythm: Normal rate and regular rhythm.      Pulses: Normal pulses.      Heart sounds: Normal heart sounds. No murmur heard.     No friction rub. No gallop.   Pulmonary:      Effort: Pulmonary effort is normal. No respiratory distress.      Breath sounds: Normal breath sounds. No wheezing, rhonchi or rales.   Abdominal:      General: There is no distension.      Palpations: Abdomen is soft.   Musculoskeletal:      Right lower leg: No edema.      Left lower leg: No edema.   Skin:     General: Skin is warm and dry.   Neurological:      General: No focal deficit present.      Mental Status: He is  alert and oriented to person, place, and time.   Psychiatric:         Mood and Affect: Mood normal.         Behavior: Behavior normal.          I/O last 24 hrs:  Intake/Output - Last 3 Shifts         03/22 0700 03/23 0659 03/23 0700 03/24 0659 03/24 0700 03/25 0659    P.O. 642 840 840    Total Intake(mL/kg) 642 (12.5) 840 (16.3) 840 (16.3)    Urine (mL/kg/hr) 900 (0.7) 1600 (1.3) 1300 (3.3)    Stool 0 0 0    Total Output 900 1600 1300    Net -258 -760 -460           Urine Occurrence 4 x 1 x     Stool Occurrence 0 x 0 x 0 x            Labs  CBC:   Recent Labs   Lab 03/24/24  0434   WBC 8.68   RBC 4.06*   HGB 12.7*   HCT 37.7*      MCV 92.9   MCH 31.3*   MCHC 33.7     CMP:   Recent Labs   Lab 03/24/24  0434   CALCIUM 9.9      K 4.2   CO2 26   BUN 16.2   CREATININE 0.87       ASSESSMENT/PLAN:    Multivessel coronary artery disease  HTN  PAD  ROBBI    -Plan for CABG tomorrow with Dr. Sullivan  -JESUSO after midnight  -Heparin drip off at midnight BOUBACAR King

## 2024-03-24 NOTE — PROGRESS NOTES
[facility] - 6th Floor [facility] Telemetry    Cardiology  Progress Note    Patient Name: Ata Pickens  MRN: [id number]  Admission Date: 3/21/2024  Hospital Length of Stay: 3 days  Code Status: No Order   Attending Physician: Monse Lima MD   Primary Care Physician: Lorraine, Primary Doctor  Expected Discharge Date:   Principal Problem:<principal problem not specified>    Subjective:   Chief Complaint:  Outpatient Cath- MV CAD- CABG Evaluation     HPI:   Mr. Pickens is a 68 year old male, known to Dr. Lima, who presented to the hospital and underwent elective coronary angiogram due to diagnosis of abnormal stress test which was performed in the outpatient setting due to reported history of chest pain, SOB, and fatigue with minimal exertion. He underwent cath on 3.21.24 revealing MV CAD including 90% distal LM/Ostial LAD Disease. Patient's EF on LV Gram noted to be 50%. Patient was admitted to [facility]. CT Surgical services consulted for CABG evaluation.    Hospital Course:  3.22.24: NAD Noted. SB on Tele. BP Stable. CP This AM, Went away on its own. No CP Currently. Right Radial Site soft.  3.23.24: NAD Noted. SR on Tele. BP Stable. On Heparin Infusion.  3.24.24: NAD Noted. Vitals Stable. SR on Tele.     PMH: Hypertension, Claudication, SOB, CP, Chronic Tobacco Use  PSH: Wrist Surgery, Colostomy Closure  Family History: Father- Lung Cancer, Mother- Lung Cancer, Brother- Cardiac Pacemaker  Social History: Tobacco- Active Smoker, Alcohol- Negative, Substance Abuse- Negative     Previous Cardiac Diagnostics:   Coronary Angiogram (3.21.24):  Coronary findings:  Dominance: right   Left main:  Calcified distal 90% stenosis extending ostium of the LAD.    Left anterior descending artery:  Type three-vessel with calcified ostial stenosis of at least 70%.    Circumflex artery:  Nondominant calcified vessel with proximal 40-50% stenosis.  The circumflex gives rise to a large patent bifurcating  OM.  Right coronary artery:  Calcified dominant vessel with serial heavily calcified segment stenosis of up to 80-90%.  The RCA terminates in a moderate bifurcating PDA and a small PL segment.    Selective LIMA angiogram:  Large widely patent vessel  Aortic root angiogram:  Calcified Type 3 bovine arch with patent innominate and proximal ICA bilateral.  Abdominal angiogram: No AAA.  Left iliac system patent.  Left common femoral artery patent proximally.  Right common and internal iliac patent.  Right external iliac with 70-80% stenosis at the pelvic brim.  Right common femoral occluded.  Left ventriculography:  EF- 50%.    Hemodynamics:LV/AO= 0 mmHg                      LVEDP= 13-16 mmHg       Carotid US (3.21.24):  The right internal carotid artery demonstrated 50-69% stenosis.   The left internal carotid artery demonstrated less than 50% stenosis.  Bilateral vertebral arteries were patent with antegrade flow.     Echocardiogram (3.5.24):  The left ventricle is decreased in size. Global left ventricular systolic function is borderline normal. The left ventricular ejection fraction is 50%. The left ventricle diastolic function is impaired (Grade I) with normal left atrial pressure.   The right atrium is moderately enlarged ~5.1 cm.  Mild (1+) tricuspid and trace mitral regurgitation.  The pulmonary artery systolic pressure is 23 mmHg.  The study quality is below average.      CV US Arterial Lower Extremities (3.5.24):  The study quality is good.   The right proximal superficial femoral artery is occluded.   The right mid and distal superficial femoral, posterior tibial, peroneal, anterior tibial, and dorsalis pedis arteries exhibit poor, diminished perfusion via collateral flow.  The right lower extremity arteries exhibit mono-phasic waveforms.  The left external iliac and common femoral arteries exhibit mono-phasic waveforms.  The remaining arteries of the left lower extremity exhibit bi-phasic waveforms.      PET  (3.1.24):  This is an abnormal perfusion study. Study is consistent with significant anterior  ischemia.   This scan is suggestive of moderate to high risk for future cardiovascular events.   Large partially reversible perfusion abnormality of severe intensity in the anterior apical region. Large partially reversible perfusion abnormality of severe intensity in the anterior septal region. Small reversible perfusion abnormality of severe intensity in the apical lateral segment.   The left ventricular cavity is noted to be normal on the stress studies. The stress left ventricular ejection fraction was calculated to be 37% and left ventricular global function is moderately reduced. The rest left ventricular cavity is noted to be normal. The rest left ventricular ejection fraction was calculated to be 52% and rest left ventricular global function is normal.   Hypokinesis of apical anterior segment is noted only in the stress studies which is suggestive of new ischemia. Persistent hypokinesis of the septal region is noted in both rest and stress studies. When compared to the resting ejection fraction (52%), the stress ejection fraction (37%) has decreased.   Transient ischemic dilatation is present and has been described as a marker for high risk coronary artery disease. It has also been described in microvascular disease, hypertensive heart disease as well as cardiac deconditioning.   The study quality is good.   There was no rise in myocardial blood flow between rest and stress.  Global myocardial blood flow reserve was 0.83.  Myocardial blood flow reserve is globally abnormal, placing the patient at a higher coronary event risk.    Review of Systems   Cardiovascular:  Negative for chest pain.   Respiratory:  Negative for shortness of breath.      Objective:     Vital Signs (Most Recent):  Temp: 97.9 °F (36.6 °C) (03/24/24 0837)  Pulse: 60 (03/24/24 0837)  Resp: 20 (03/24/24 0342)  BP: (!) 150/89 (03/24/24 0837)  SpO2:  95 % (03/24/24 0342) Vital Signs (24h Range):  Temp:  [97.9 °F (36.6 °C)-98.7 °F (37.1 °C)] 97.9 °F (36.6 °C)  Pulse:  [] 60  Resp:  [16-20] 20  SpO2:  [95 %-97 %] 95 %  BP: (128-161)/(66-89) 150/89   Weight: 51.4 kg (113 lb 6.4 oz)  Body mass index is 19.47 kg/m².  SpO2: 95 %       Intake/Output Summary (Last 24 hours) at 3/24/2024 0921  Last data filed at 3/24/2024 0513  Gross per 24 hour   Intake 840 ml   Output 1600 ml   Net -760 ml       Lines/Drains/Airways       Peripheral Intravenous Line  Duration                  Peripheral IV - Single Lumen 03/21/24 0900 20 G Anterior;Left Forearm 3 days         Peripheral IV - Single Lumen 03/22/24 2100 20 G Left Forearm 1 day                  Significant Labs:   Recent Results (from the past 72 hour(s))   CV Ultrasound Bilateral Doppler Carotid    Collection Time: 03/21/24 12:33 PM   Result Value Ref Range    Right CCA prox sys 75 cm/s    Right CCA prox erickson 12 cm/s    Right CCA dist sys 42 cm/s    Right CCA dist erickson 7 cm/s    Right ICA prox sys 167 cm/s    Right ICA prox erickson 37 cm/s    Right ICA mid sys 171 cm/s    Right ICA mid erickson 38 cm/s    Right ICA dist sys 38 cm/s    Right ICA dist erickson 10 cm/s    Right ECA sys 66 cm/s    Right ECA erickson 0 cm/s    Right vertebral sys 88 cm/s    Right vertebral erickson 9 cm/s    Right ICA/CCA ratio 4.07     Right Highest .00     Right Highest EDV 38.00     Right Highest CCA 75     Left CCA prox sys 81 cm/s    Left CCA prox erickson 14 cm/s    Left CCA dist sys 61 cm/s    Left CCA dist erickson 13 cm/s    Left ICA prox sys 98 cm/s    Left ICA prox erickson 25 cm/s    Left ICA mid sys 90 cm/s    Left ICA mid erickson 25 cm/s    Left ICA dist sys 76 cm/s    Left ICA dist erickson 20 cm/s    Left ECA sys 50 cm/s    Left ECA erickson 0 cm/s    Left vertebral sys 38 cm/s    Left vertebral erickson 6 cm/s    Left ICA/CCA ratio 1.61     Left Highest ICA 98.00     LT Highest EDV 25.00     Left Highest CCA 81    APTT    Collection Time: 03/21/24  1:43 PM    Result Value Ref Range    PTT 43.3 (H) 23.2 - 33.7 seconds   Protime-INR    Collection Time: 03/21/24  1:43 PM   Result Value Ref Range    PT 13.3 12.5 - 14.5 seconds    INR 1.0 <=1.3   CBC with Differential    Collection Time: 03/21/24  1:43 PM   Result Value Ref Range    WBC 8.68 4.50 - 11.50 x10(3)/mcL    RBC 3.94 (L) 4.70 - 6.10 x10(6)/mcL    Hgb 12.4 (L) 14.0 - 18.0 g/dL    Hct 36.8 (L) 42.0 - 52.0 %    MCV 93.4 80.0 - 94.0 fL    MCH 31.5 (H) 27.0 - 31.0 pg    MCHC 33.7 33.0 - 36.0 g/dL    RDW 13.3 11.5 - 17.0 %    Platelet 237 130 - 400 x10(3)/mcL    MPV 10.0 7.4 - 10.4 fL    Neut % 61.3 %    Lymph % 27.2 %    Mono % 7.0 %    Eos % 3.0 %    Basophil % 1.3 %    Lymph # 2.36 0.6 - 4.6 x10(3)/mcL    Neut # 5.32 2.1 - 9.2 x10(3)/mcL    Mono # 0.61 0.1 - 1.3 x10(3)/mcL    Eos # 0.26 0 - 0.9 x10(3)/mcL    Baso # 0.11 <=0.2 x10(3)/mcL    IG# 0.02 0 - 0.04 x10(3)/mcL    IG% 0.2 %    NRBC% 0.0 %   APTT    Collection Time: 03/21/24  8:08 PM   Result Value Ref Range    PTT 41.4 (H) 23.2 - 33.7 seconds   Comprehensive metabolic panel    Collection Time: 03/22/24  3:31 AM   Result Value Ref Range    Sodium Level 138 136 - 145 mmol/L    Potassium Level 4.2 3.5 - 5.1 mmol/L    Chloride 105 98 - 107 mmol/L    Carbon Dioxide 24 23 - 31 mmol/L    Glucose Level 99 82 - 115 mg/dL    Blood Urea Nitrogen 15.6 8.4 - 25.7 mg/dL    Creatinine 0.86 0.73 - 1.18 mg/dL    Calcium Level Total 9.3 8.8 - 10.0 mg/dL    Protein Total 6.7 5.8 - 7.6 gm/dL    Albumin Level 3.7 3.4 - 4.8 g/dL    Globulin 3.0 2.4 - 3.5 gm/dL    Albumin/Globulin Ratio 1.2 1.1 - 2.0 ratio    Bilirubin Total 0.9 <=1.5 mg/dL    Alkaline Phosphatase 77 40 - 150 unit/L    Alanine Aminotransferase 10 0 - 55 unit/L    Aspartate Aminotransferase 16 5 - 34 unit/L    eGFR >60 mls/min/1.73/m2   Magnesium    Collection Time: 03/22/24  3:31 AM   Result Value Ref Range    Magnesium Level 2.00 1.60 - 2.60 mg/dL   APTT    Collection Time: 03/22/24  3:31 AM   Result Value Ref  Range    PTT 71.9 (H) 23.2 - 33.7 seconds   CBC with Differential    Collection Time: 03/22/24  3:31 AM   Result Value Ref Range    WBC 7.22 4.50 - 11.50 x10(3)/mcL    RBC 3.97 (L) 4.70 - 6.10 x10(6)/mcL    Hgb 12.6 (L) 14.0 - 18.0 g/dL    Hct 37.1 (L) 42.0 - 52.0 %    MCV 93.5 80.0 - 94.0 fL    MCH 31.7 (H) 27.0 - 31.0 pg    MCHC 34.0 33.0 - 36.0 g/dL    RDW 13.2 11.5 - 17.0 %    Platelet 209 130 - 400 x10(3)/mcL    MPV 10.0 7.4 - 10.4 fL    Neut % 49.2 %    Lymph % 36.3 %    Mono % 8.2 %    Eos % 4.7 %    Basophil % 1.5 %    Lymph # 2.62 0.6 - 4.6 x10(3)/mcL    Neut # 3.55 2.1 - 9.2 x10(3)/mcL    Mono # 0.59 0.1 - 1.3 x10(3)/mcL    Eos # 0.34 0 - 0.9 x10(3)/mcL    Baso # 0.11 <=0.2 x10(3)/mcL    IG# 0.01 0 - 0.04 x10(3)/mcL    IG% 0.1 %    NRBC% 0.0 %   EKG 12-LEAD starting tomorrow    Collection Time: 03/22/24  5:41 AM   Result Value Ref Range    QRS Duration 96 ms    [facility] QTC Calculation 434 ms   APTT    Collection Time: 03/22/24  9:51 AM   Result Value Ref Range    PTT 65.9 (H) 23.2 - 33.7 seconds   EKG 12-lead    Collection Time: 03/22/24  7:21 PM   Result Value Ref Range    QRS Duration 72 ms    [facility] QTC Calculation 426 ms   APTT    Collection Time: 03/23/24  7:53 AM   Result Value Ref Range    PTT 52.5 (H) 23.2 - 33.7 seconds   CBC with Differential    Collection Time: 03/23/24  7:53 AM   Result Value Ref Range    WBC 8.15 4.50 - 11.50 x10(3)/mcL    RBC 3.86 (L) 4.70 - 6.10 x10(6)/mcL    Hgb 12.2 (L) 14.0 - 18.0 g/dL    Hct 36.2 (L) 42.0 - 52.0 %    MCV 93.8 80.0 - 94.0 fL    MCH 31.6 (H) 27.0 - 31.0 pg    MCHC 33.7 33.0 - 36.0 g/dL    RDW 13.3 11.5 - 17.0 %    Platelet 250 130 - 400 x10(3)/mcL    MPV 10.1 7.4 - 10.4 fL    Neut % 68.4 %    Lymph % 20.7 %    Mono % 6.9 %    Eos % 2.6 %    Basophil % 1.2 %    Lymph # 1.69 0.6 - 4.6 x10(3)/mcL    Neut # 5.57 2.1 - 9.2 x10(3)/mcL    Mono # 0.56 0.1 - 1.3 x10(3)/mcL    Eos # 0.21 0 - 0.9 x10(3)/mcL    Baso # 0.10 <=0.2 x10(3)/mcL    IG# 0.02 0 - 0.04 x10(3)/mcL     IG% 0.2 %    NRBC% 0.0 %   APTT    Collection Time: 03/23/24  3:27 PM   Result Value Ref Range    PTT 68.3 (H) 23.2 - 33.7 seconds   APTT    Collection Time: 03/23/24  9:15 PM   Result Value Ref Range    PTT 69.2 (H) 23.2 - 33.7 seconds   APTT    Collection Time: 03/24/24  4:34 AM   Result Value Ref Range    PTT 69.1 (H) 23.2 - 33.7 seconds   Basic Metabolic Panel    Collection Time: 03/24/24  4:34 AM   Result Value Ref Range    Sodium Level 140 136 - 145 mmol/L    Potassium Level 4.2 3.5 - 5.1 mmol/L    Chloride 104 98 - 107 mmol/L    Carbon Dioxide 26 23 - 31 mmol/L    Glucose Level 116 (H) 82 - 115 mg/dL    Blood Urea Nitrogen 16.2 8.4 - 25.7 mg/dL    Creatinine 0.87 0.73 - 1.18 mg/dL    BUN/Creatinine Ratio 19     Calcium Level Total 9.9 8.8 - 10.0 mg/dL    Anion Gap 10.0 mEq/L    eGFR >60 mls/min/1.73/m2   Magnesium    Collection Time: 03/24/24  4:34 AM   Result Value Ref Range    Magnesium Level 1.90 1.60 - 2.60 mg/dL   CBC with Differential    Collection Time: 03/24/24  4:34 AM   Result Value Ref Range    WBC 8.68 4.50 - 11.50 x10(3)/mcL    RBC 4.06 (L) 4.70 - 6.10 x10(6)/mcL    Hgb 12.7 (L) 14.0 - 18.0 g/dL    Hct 37.7 (L) 42.0 - 52.0 %    MCV 92.9 80.0 - 94.0 fL    MCH 31.3 (H) 27.0 - 31.0 pg    MCHC 33.7 33.0 - 36.0 g/dL    RDW 13.2 11.5 - 17.0 %    Platelet 265 130 - 400 x10(3)/mcL    MPV 10.0 7.4 - 10.4 fL    Neut % 64.8 %    Lymph % 22.5 %    Mono % 7.7 %    Eos % 3.5 %    Basophil % 1.2 %    Lymph # 1.95 0.6 - 4.6 x10(3)/mcL    Neut # 5.63 2.1 - 9.2 x10(3)/mcL    Mono # 0.67 0.1 - 1.3 x10(3)/mcL    Eos # 0.30 0 - 0.9 x10(3)/mcL    Baso # 0.10 <=0.2 x10(3)/mcL    IG# 0.03 0 - 0.04 x10(3)/mcL    IG% 0.3 %    NRBC% 0.0 %   MRSA PCR    Collection Time: 03/24/24  6:09 AM   Result Value Ref Range    MRSA PCR SCRN ([facility]) Not Detected Not Detected   Urinalysis    Collection Time: 03/24/24  6:11 AM   Result Value Ref Range    Color, UA Light-Yellow Yellow, Light-Yellow, Colorless, Straw, Dark-Yellow     Appearance, UA Clear Clear    Specific Gravity, UA 1.011 1.005 - 1.030    pH, UA 6.5 5.0 - 8.5    Protein, UA Negative Negative    Glucose, UA Normal Negative, Normal    Ketones, UA Negative Negative    Blood, UA Negative Negative    Bilirubin, UA Negative Negative    Urobilinogen, UA Normal 0.2, 1.0, Normal    Nitrites, UA Negative Negative    Leukocyte Esterase, UA Negative Negative    WBC, UA 0-5 None Seen, 0-2, 3-5, 0-5 /HPF    Bacteria, UA None Seen None Seen, Trace /HPF    Squamous Epithelial Cells, UA Trace None Seen /HPF    RBC, UA 0-5 None Seen, 0-2, 3-5, 0-5 /HPF   Type & Screen    Collection Time: 03/24/24  6:46 AM   Result Value Ref Range    Group & Rh A POS     Indirect Jarrett GEL NEG     Specimen Outdate 03/27/2024 23:59    Prepare RBC 4 Units; ON CALL TO OR    Collection Time: 03/24/24  6:46 AM   Result Value Ref Range    UNIT NUMBER [id number]     UNIT ABO/RH A POS     DISPENSE STATUS Selected     Unit Expiration [id number]     Product Code V4387D54     Unit Blood Type Code 6200     CROSSMATCH INTERPRETATION Compatible     UNIT NUMBER [id number]     UNIT ABO/RH A POS     DISPENSE STATUS Selected     Unit Expiration [id number]     Product Code K0937H88     Unit Blood Type Code 6200     CROSSMATCH INTERPRETATION Compatible     UNIT NUMBER [id number]     UNIT ABO/RH A POS     DISPENSE STATUS Selected     Unit Expiration [id number]     Product Code T8458K16     Unit Blood Type Code 6200     CROSSMATCH INTERPRETATION Compatible     UNIT NUMBER [id number]     UNIT ABO/RH A POS     DISPENSE STATUS Selected     Unit Expiration [id number]     Product Code U3651D72     Unit Blood Type Code 6200     CROSSMATCH INTERPRETATION Compatible    ABORH RETYPE    Collection Time: 03/24/24  8:36 AM   Result Value Ref Range    ABORH Retype A POS      Telemetry:  SB    Physical Exam  Vitals and nursing note reviewed.   Constitutional:       Appearance: Normal appearance.   HENT:      Head:  Normocephalic.      Mouth/Throat:      Mouth: Mucous membranes are moist.      Pharynx: Oropharynx is clear.   Cardiovascular:      Rate and Rhythm: Normal rate and regular rhythm.      Pulses: Normal pulses.           Radial pulses are 2+ on the right side.      Heart sounds: Normal heart sounds.   Pulmonary:      Effort: Pulmonary effort is normal. No respiratory distress.      Breath sounds: Normal breath sounds.   Abdominal:      General: There is no distension.      Palpations: Abdomen is soft.      Tenderness: There is no abdominal tenderness. There is no guarding.   Musculoskeletal:         General: Normal range of motion.      Cervical back: Neck supple.      Right lower leg: No edema.      Left lower leg: No edema.   Skin:     General: Skin is warm and dry.   Neurological:      General: No focal deficit present.      Mental Status: He is alert and oriented to person, place, and time. Mental status is at baseline.   Psychiatric:         Mood and Affect: Mood normal.         Behavior: Behavior normal.         Thought Content: Thought content normal.         Judgment: Judgment normal.       Current Inpatient Medications:    Current Facility-Administered Medications:     amLODIPine tablet 10 mg, 10 mg, Oral, Daily, Chetna Rivera T, FNP, 10 mg at 03/24/24 0900    aspirin EC tablet 81 mg, 81 mg, Oral, Daily, Chetna Rivera, FNP, 81 mg at 03/24/24 0900    atorvastatin tablet 40 mg, 40 mg, Oral, Daily, Brock Young, FNP, 40 mg at 03/24/24 0900    cefUROXime sodium 1.5 g in dextrose 5 % in water (D5W) 100 mL IVPB (MB+), 1.5 g, Intravenous, On Call Procedure, Reji Sullivan MD    diazePAM tablet 10 mg, 10 mg, Oral, On Call Procedure, Monse Lima MD, 10 mg at 03/21/24 1021    diphenhydrAMINE capsule 50 mg, 50 mg, Oral, On Call Procedure, Monse Lima MD, 50 mg at 03/21/24 1021    heparin 25,000 units in dextrose 5% (100 units/ml) IV bolus from bag LOW INTENSITY nomogram - [facility], 60 Units/kg  (Adjusted), Intravenous, PRN, Reji Sullivan MD, 2,890 Units at 03/21/24 2056    heparin 25,000 units in dextrose 5% (100 units/ml) IV bolus from bag LOW INTENSITY nomogram - [facility], 30 Units/kg (Adjusted), Intravenous, PRN, Reji Sullivan MD, 1,450 Units at 03/23/24 0846    heparin 25,000 units in dextrose 5% 250 mL (100 units/mL) infusion LOW INTENSITY nomogram - [facility], 0-40 Units/kg/hr (Adjusted), Intravenous, Continuous, Reji Sullivan MD, Last Rate: 8.2 mL/hr at 03/23/24 1930, 17 Units/kg/hr at 03/23/24 1930    hydrALAZINE injection 10 mg, 10 mg, Intravenous, Q4H PRN, Chetna Rivera, LEXIIP, 10 mg at 03/22/24 1903    morphine injection 2 mg, 2 mg, Intravenous, Q1H PRN, Brock Young FNP    [START ON 3/25/2024] mupirocin 2 % ointment, , Nasal, On Call Procedure, Reji Sullivan MD    nitroGLYCERIN in 5 % dextrose 50 mg/250 mL (200 mcg/mL) infusion, 0-400 mcg/min, Intravenous, Continuous, Ba Alves., NP, Last Rate: 1.5 mL/hr at 03/22/24 2045, 5 mcg/min at 03/22/24 2045    nitroGLYCERIN SL tablet 0.4 mg, 0.4 mg, Sublingual, Q5 Min PRN, Chetna Rivera, LEXIIP, 0.4 mg at 03/22/24 1900    ondansetron injection 4 mg, 4 mg, Intravenous, Q12H PRN, Brock Young FNP    pantoprazole EC tablet 40 mg, 40 mg, Oral, Daily, Chetna Rivera, LEXIIP, 40 mg at 03/24/24 0900    sodium chloride 0.9% flush 10 mL, 10 mL, Intravenous, PRN, Monse Lima MD  VTE Risk Mitigation (From admission, onward)           Ordered     heparin 25,000 units in dextrose 5% (100 units/ml) IV bolus from bag LOW INTENSITY nomogram - [facility]  As needed (PRN)        Question:  Heparin Infusion Adjustment (DO NOT MODIFY ANSWER)  Answer:  [email or web address]\Images\Pharmacy\HeparinInfusions\heparin LOW INTENSITY nomogram for [facility] [id number].pdf    03/21/24 1348     heparin 25,000 units in dextrose 5% (100 units/ml) IV bolus from bag LOW INTENSITY nomogram - [facility]  As needed (PRN)        Question:  Heparin Infusion Adjustment (DO NOT MODIFY  ANSWER)  Answer:  [email or web address]\epic\Images\Pharmacy\HeparinInfusions\heparin LOW INTENSITY nomogram for [facility] [id number].pdf    03/21/24 1342     heparin 25,000 units in dextrose 5% 250 mL (100 units/mL) infusion LOW INTENSITY nomogram - [facility]  Continuous        Question:  Begin at (units/kg/hr)  Answer:  12    03/21/24 1402                  I,Albino Krishnan MD,performed the substantive portion of this visit. I had a face-to-face time with the patient on 3/24/24. I reviewed and agree with the nurse practitioner's history, physical exam.     Medical decision making:        Assessment/Plan:   CAD (Multivessel)    - LM: 90% Distal, LAD: 70% Ostial, LCX: 40-50% Proximal, RCA: (Dominant) 80-90% Serially Calcified Lesions (EF 50%) (3.21.24)  Hypertension (BP Stable)  PAD    - REIA 70-80% Stenosis at Pelvic Brim (3.21.24)  Nicotine Dependence  ROBBI    - 50-69% MUSTAPHA (CUS 3.21.24)  Intermittent Sinus Bradycardia (Beta Blocker on Hold) - may improve after intervention    Impression:  Continue Aspirin 81 Mg Daily & Continue Statin & Imdur  Holding Beta Blocker for now given Intermittent Bradycardia  Continue Heparin Infusion Per Protocol Re: MV CAD (Left Main Disease)  NTG SL PRN CP  Monitor on Tele  Patient educated on the importance of notifying nursing staff should CP Recur and persist.  CT Surgery Planning for Monday 3.25.24.    BOUBACAR Leigh  Cardiology  [facility] - 6th Floor Medical Telemetry  03/24/2024

## 2024-03-25 ENCOUNTER — ANESTHESIA (OUTPATIENT)
Dept: SURGERY | Facility: HOSPITAL | Age: 69
DRG: 234 | End: 2024-03-25
Payer: COMMERCIAL

## 2024-03-25 ENCOUNTER — ANESTHESIA EVENT (OUTPATIENT)
Dept: SURGERY | Facility: HOSPITAL | Age: 69
DRG: 234 | End: 2024-03-25
Payer: COMMERCIAL

## 2024-03-25 LAB
ALBUMIN SERPL-MCNC: 3.4 G/DL (ref 3.4–4.8)
ALBUMIN/GLOB SERPL: 1.5 RATIO (ref 1.1–2)
ALLENS TEST BLOOD GAS (OHS): ABNORMAL
ALP SERPL-CCNC: 61 UNIT/L (ref 40–150)
ALT SERPL-CCNC: 11 UNIT/L (ref 0–55)
ANION GAP SERPL CALC-SCNC: 5 MEQ/L
APTT PPP: 30 SECONDS (ref 23.2–33.7)
APTT PPP: 30.3 SECONDS (ref 23.2–33.7)
APTT PPP: 66.5 SECONDS (ref 23.2–33.7)
AST SERPL-CCNC: 24 UNIT/L (ref 5–34)
BASE EXCESS BLD CALC-SCNC: -1.5 MMOL/L
BASE EXCESS BLD CALC-SCNC: -3.4 MMOL/L
BASE EXCESS BLD CALC-SCNC: -5.1 MMOL/L
BASE EXCESS BLD CALC-SCNC: 4.6 MMOL/L (ref -2–2)
BASOPHILS # BLD AUTO: 0.06 X10(3)/MCL
BASOPHILS # BLD AUTO: 0.09 X10(3)/MCL
BASOPHILS NFR BLD AUTO: 0.6 %
BASOPHILS NFR BLD AUTO: 1 %
BILIRUB SERPL-MCNC: 1.9 MG/DL
BLOOD GAS SAMPLE TYPE (OHS): ABNORMAL
BSA FOR ECHO PROCEDURE: 1.48 M2
BUN SERPL-MCNC: 14.6 MG/DL (ref 8.4–25.7)
BUN SERPL-MCNC: 16 MG/DL (ref 8.4–25.7)
CA-I BLD-SCNC: 1.14 MMOL/L (ref 1.12–1.23)
CA-I BLD-SCNC: 1.2 MMOL/L (ref 1.12–1.23)
CA-I BLD-SCNC: 1.27 MMOL/L (ref 1.12–1.23)
CA-I BLD-SCNC: 1.35 MMOL/L (ref 1.12–1.23)
CALCIUM SERPL-MCNC: 9.5 MG/DL (ref 8.8–10)
CALCIUM SERPL-MCNC: 9.5 MG/DL (ref 8.8–10)
CHLORIDE SERPL-SCNC: 112 MMOL/L (ref 98–107)
CHLORIDE SERPL-SCNC: 114 MMOL/L (ref 98–107)
CO2 BLDA-SCNC: 24 MMOL/L
CO2 BLDA-SCNC: 24.1 MMOL/L
CO2 BLDA-SCNC: 25 MMOL/L
CO2 BLDA-SCNC: 31.2 MMOL/L
CO2 SERPL-SCNC: 19 MMOL/L (ref 23–31)
CO2 SERPL-SCNC: 19 MMOL/L (ref 23–31)
COHGB MFR BLDA: 1.6 % (ref 0.5–1.5)
CREAT SERPL-MCNC: 0.67 MG/DL (ref 0.73–1.18)
CREAT SERPL-MCNC: 0.7 MG/DL (ref 0.73–1.18)
CREAT/UREA NIT SERPL: 24
DRAWN BY BLOOD GAS (OHS): ABNORMAL
EOSINOPHIL # BLD AUTO: 0.13 X10(3)/MCL (ref 0–0.9)
EOSINOPHIL # BLD AUTO: 0.25 X10(3)/MCL (ref 0–0.9)
EOSINOPHIL NFR BLD AUTO: 1.3 %
EOSINOPHIL NFR BLD AUTO: 2.7 %
ERYTHROCYTE [DISTWIDTH] IN BLOOD BY AUTOMATED COUNT: 13.1 % (ref 11.5–17)
ERYTHROCYTE [DISTWIDTH] IN BLOOD BY AUTOMATED COUNT: 13.3 % (ref 11.5–17)
ERYTHROCYTE [DISTWIDTH] IN BLOOD BY AUTOMATED COUNT: 13.4 % (ref 11.5–17)
FIO2: 100
FIO2: 60
FIO2: 60
FIO2: 70
FIO2: 70
GFR SERPLBLD CREATININE-BSD FMLA CKD-EPI: >60 MLS/MIN/1.73/M2
GFR SERPLBLD CREATININE-BSD FMLA CKD-EPI: >60 MLS/MIN/1.73/M2
GLOBULIN SER-MCNC: 2.3 GM/DL (ref 2.4–3.5)
GLUCOSE SERPL-MCNC: 130 MG/DL (ref 70–110)
GLUCOSE SERPL-MCNC: 137 MG/DL (ref 70–110)
GLUCOSE SERPL-MCNC: 150 MG/DL (ref 82–115)
GLUCOSE SERPL-MCNC: 152 MG/DL (ref 82–115)
GLUCOSE SERPL-MCNC: 154 MG/DL (ref 70–110)
GLUCOSE SERPL-MCNC: 156 MG/DL (ref 70–110)
GLUCOSE SERPL-MCNC: 165 MG/DL (ref 70–110)
GLUCOSE SERPL-MCNC: 96 MG/DL (ref 70–110)
HCO3 BLDA-SCNC: 22.4 MMOL/L (ref 22–26)
HCO3 BLDA-SCNC: 22.7 MMOL/L (ref 22–26)
HCO3 BLDA-SCNC: 23.7 MMOL/L (ref 22–26)
HCO3 BLDA-SCNC: 29.8 MMOL/L (ref 22–26)
HCO3 UR-SCNC: 22.6 MMOL/L (ref 24–28)
HCO3 UR-SCNC: 25.3 MMOL/L (ref 24–28)
HCO3 UR-SCNC: 25.5 MMOL/L (ref 24–28)
HCO3 UR-SCNC: 25.5 MMOL/L (ref 24–28)
HCO3 UR-SCNC: 25.7 MMOL/L (ref 24–28)
HCO3 UR-SCNC: 26.8 MMOL/L (ref 24–28)
HCT VFR BLD AUTO: 31.5 % (ref 42–52)
HCT VFR BLD AUTO: 33.5 % (ref 42–52)
HCT VFR BLD AUTO: 37.7 % (ref 42–52)
HCT VFR BLD AUTO: 40.7 % (ref 42–52)
HCT VFR BLD AUTO: 40.7 % (ref 42–52)
HCT VFR BLD CALC: 21 %PCV (ref 36–54)
HCT VFR BLD CALC: 22 %PCV (ref 36–54)
HCT VFR BLD CALC: 22 %PCV (ref 36–54)
HCT VFR BLD CALC: 23 %PCV (ref 36–54)
HCT VFR BLD CALC: 23 %PCV (ref 36–54)
HCT VFR BLD CALC: 31 %PCV (ref 36–54)
HGB BLD-MCNC: 10.6 G/DL (ref 14–18)
HGB BLD-MCNC: 11 G/DL
HGB BLD-MCNC: 11.1 G/DL (ref 14–18)
HGB BLD-MCNC: 12.7 G/DL (ref 14–18)
HGB BLD-MCNC: 13.6 G/DL (ref 14–18)
HGB BLD-MCNC: 13.6 G/DL (ref 14–18)
HGB BLD-MCNC: 7 G/DL
HGB BLD-MCNC: 8 G/DL
IMM GRANULOCYTES # BLD AUTO: 0.03 X10(3)/MCL (ref 0–0.04)
IMM GRANULOCYTES # BLD AUTO: 0.09 X10(3)/MCL (ref 0–0.04)
IMM GRANULOCYTES NFR BLD AUTO: 0.3 %
IMM GRANULOCYTES NFR BLD AUTO: 0.9 %
INHALED O2 CONCENTRATION: 30 %
INHALED O2 CONCENTRATION: 40 %
INHALED O2 CONCENTRATION: 60 %
INR PPP: 1.3
INR PPP: 1.5
ITIME (SEC) (OHS): 1 SEC
ITIME (SEC) (OHS): 1 SEC
LPM (OHS): 2
LYMPHOCYTES # BLD AUTO: 2.26 X10(3)/MCL (ref 0.6–4.6)
LYMPHOCYTES # BLD AUTO: 2.7 X10(3)/MCL (ref 0.6–4.6)
LYMPHOCYTES NFR BLD AUTO: 24.4 %
LYMPHOCYTES NFR BLD AUTO: 26.3 %
MAGNESIUM SERPL-MCNC: 2.2 MG/DL (ref 1.6–2.6)
MCH RBC QN AUTO: 31 PG (ref 27–31)
MCH RBC QN AUTO: 31.4 PG (ref 27–31)
MCH RBC QN AUTO: 31.5 PG (ref 27–31)
MCHC RBC AUTO-ENTMCNC: 33.1 G/DL (ref 33–36)
MCHC RBC AUTO-ENTMCNC: 33.4 G/DL (ref 33–36)
MCHC RBC AUTO-ENTMCNC: 33.7 G/DL (ref 33–36)
MCV RBC AUTO: 92.7 FL (ref 80–94)
MCV RBC AUTO: 93.1 FL (ref 80–94)
MCV RBC AUTO: 95.2 FL (ref 80–94)
MECH RR (OHS): 20 B/MIN
MECH RR (OHS): 20 B/MIN
METHGB MFR BLDA: 0.9 % (ref 0.4–1.5)
MODE (OHS): ABNORMAL
MONOCYTES # BLD AUTO: 0.35 X10(3)/MCL (ref 0.1–1.3)
MONOCYTES # BLD AUTO: 0.87 X10(3)/MCL (ref 0.1–1.3)
MONOCYTES NFR BLD AUTO: 3.4 %
MONOCYTES NFR BLD AUTO: 9.4 %
NEUTROPHILS # BLD AUTO: 5.77 X10(3)/MCL (ref 2.1–9.2)
NEUTROPHILS # BLD AUTO: 6.93 X10(3)/MCL (ref 2.1–9.2)
NEUTROPHILS NFR BLD AUTO: 62.2 %
NEUTROPHILS NFR BLD AUTO: 67.5 %
NRBC BLD AUTO-RTO: 0 %
O2 HB BLOOD GAS (OHS): 97.2 % (ref 94–97)
OXYGEN DEVICE BLOOD GAS (OHS): ABNORMAL
OXYHGB MFR BLDA: 11.9 G/DL (ref 12–16)
PCO2 BLDA: 37.4 MMHG (ref 35–45)
PCO2 BLDA: 39.3 MMHG (ref 35–45)
PCO2 BLDA: 41 MMHG (ref 35–45)
PCO2 BLDA: 41.9 MMHG (ref 35–45)
PCO2 BLDA: 43.9 MMHG (ref 35–45)
PCO2 BLDA: 44 MMHG (ref 35–45)
PCO2 BLDA: 44.3 MMHG (ref 35–45)
PCO2 BLDA: 46 MMHG (ref 35–45)
PCO2 BLDA: 47.1 MMHG (ref 35–45)
PCO2 BLDA: 51 MMHG (ref 35–45)
PEEP RESPIRATORY: 5 CMH2O
PH BLDA: 7.25 [PH] (ref 7.35–7.45)
PH BLDA: 7.32 [PH] (ref 7.35–7.45)
PH BLDA: 7.37 [PH] (ref 7.35–7.45)
PH BLDA: 7.42 [PH] (ref 7.35–7.45)
PH SMN: 7.32 [PH] (ref 7.35–7.45)
PH SMN: 7.36 [PH] (ref 7.35–7.45)
PH SMN: 7.37 [PH] (ref 7.35–7.45)
PH SMN: 7.4 [PH] (ref 7.35–7.45)
PH SMN: 7.42 [PH] (ref 7.35–7.45)
PH SMN: 7.44 [PH] (ref 7.35–7.45)
PHOSPHATE SERPL-MCNC: 4.4 MG/DL (ref 2.3–4.7)
PLATELET # BLD AUTO: 108 X10(3)/MCL (ref 130–400)
PLATELET # BLD AUTO: 145 X10(3)/MCL (ref 130–400)
PLATELET # BLD AUTO: 254 X10(3)/MCL (ref 130–400)
PLATELETS.RETICULATED NFR BLD AUTO: 4.7 % (ref 0.9–11.2)
PLATELETS.RETICULATED NFR BLD AUTO: 6.2 % (ref 0.9–11.2)
PMV BLD AUTO: 10.2 FL (ref 7.4–10.4)
PMV BLD AUTO: 10.2 FL (ref 7.4–10.4)
PMV BLD AUTO: 10.3 FL (ref 7.4–10.4)
PO2 BLDA: 116 MMHG (ref 80–100)
PO2 BLDA: 322 MMHG (ref 80–100)
PO2 BLDA: 324 MMHG (ref 80–100)
PO2 BLDA: 340 MMHG (ref 80–100)
PO2 BLDA: 35 MMHG (ref 40–60)
PO2 BLDA: 359 MMHG (ref 80–100)
PO2 BLDA: 46 MMHG (ref 40–60)
PO2 BLDA: 61 MMHG (ref 80–100)
PO2 BLDA: 86 MMHG (ref 80–100)
PO2 BLDA: 91 MMHG (ref 80–100)
POC BE: -4 MMOL/L
POC BE: 0 MMOL/L
POC BE: 1 MMOL/L
POC IONIZED CALCIUM: 0.96 MMOL/L (ref 1.06–1.42)
POC IONIZED CALCIUM: 0.98 MMOL/L (ref 1.06–1.42)
POC IONIZED CALCIUM: 1.03 MMOL/L (ref 1.06–1.42)
POC IONIZED CALCIUM: 1.21 MMOL/L (ref 1.06–1.42)
POC IONIZED CALCIUM: 1.29 MMOL/L (ref 1.06–1.42)
POC IONIZED CALCIUM: 1.73 MMOL/L (ref 1.06–1.42)
POC PCO2 TEMP: 29 MMHG
POC PCO2 TEMP: 33.7 MMHG
POC PCO2 TEMP: 39.1 MMHG
POC PCO2 TEMP: 44.1 MMHG
POC PCO2 TEMP: 45.1 MMHG
POC PH TEMP: 7.37
POC PH TEMP: 7.38
POC PH TEMP: 7.42
POC PH TEMP: 7.47
POC PH TEMP: 7.53
POC PO2 TEMP: 32 MMHG
POC PO2 TEMP: 321 MMHG
POC PO2 TEMP: 324 MMHG
POC PO2 TEMP: 329 MMHG
POC PO2 TEMP: 33 MMHG
POC SATURATED O2: 100 % (ref 95–100)
POC SATURATED O2: 65 % (ref 95–100)
POC SATURATED O2: 81 % (ref 95–100)
POC TCO2: 24 MMOL/L (ref 23–27)
POC TCO2: 26 MMOL/L (ref 23–27)
POC TCO2: 27 MMOL/L (ref 23–27)
POC TCO2: 27 MMOL/L (ref 23–27)
POC TCO2: 27 MMOL/L (ref 24–29)
POC TCO2: 28 MMOL/L (ref 24–29)
POC TEMPERATURE: ABNORMAL
POCT GLUCOSE: 101 MG/DL (ref 70–110)
POCT GLUCOSE: 101 MG/DL (ref 70–110)
POCT GLUCOSE: 116 MG/DL (ref 70–110)
POCT GLUCOSE: 122 MG/DL (ref 70–110)
POCT GLUCOSE: 153 MG/DL (ref 70–110)
POCT GLUCOSE: 169 MG/DL (ref 70–110)
POCT GLUCOSE: 60 MG/DL (ref 70–110)
POCT GLUCOSE: 69 MG/DL (ref 70–110)
POCT GLUCOSE: 87 MG/DL (ref 70–110)
POCT GLUCOSE: 94 MG/DL (ref 70–110)
POTASSIUM BLD-SCNC: 3.6 MMOL/L (ref 3.5–5.1)
POTASSIUM BLD-SCNC: 3.7 MMOL/L (ref 3.5–5.1)
POTASSIUM BLD-SCNC: 4.4 MMOL/L (ref 3.5–5.1)
POTASSIUM BLD-SCNC: 4.8 MMOL/L (ref 3.5–5.1)
POTASSIUM BLD-SCNC: 5.1 MMOL/L (ref 3.5–5.1)
POTASSIUM BLD-SCNC: 5.7 MMOL/L (ref 3.5–5.1)
POTASSIUM BLOOD GAS (OHS): 3.4 MMOL/L (ref 3.5–5)
POTASSIUM BLOOD GAS (OHS): 3.4 MMOL/L (ref 3.5–5)
POTASSIUM BLOOD GAS (OHS): 3.8 MMOL/L (ref 3.5–5)
POTASSIUM BLOOD GAS (OHS): 4 MMOL/L (ref 3.5–5)
POTASSIUM SERPL-SCNC: 3.5 MMOL/L (ref 3.5–5.1)
POTASSIUM SERPL-SCNC: 3.8 MMOL/L (ref 3.5–5.1)
POTASSIUM SERPL-SCNC: 4.3 MMOL/L (ref 3.5–5.1)
POTASSIUM SERPL-SCNC: 4.4 MMOL/L (ref 3.5–5.1)
PROT SERPL-MCNC: 5.7 GM/DL (ref 5.8–7.6)
PROTHROMBIN TIME: 15.7 SECONDS (ref 12.5–14.5)
PROTHROMBIN TIME: 18 SECONDS (ref 12.5–14.5)
PS (OHS): 10 CMH2O
RBC # BLD AUTO: 3.52 X10(6)/MCL (ref 4.7–6.1)
RBC # BLD AUTO: 4.05 X10(6)/MCL (ref 4.7–6.1)
RBC # BLD AUTO: 4.39 X10(6)/MCL (ref 4.7–6.1)
SAMPLE SITE BLOOD GAS (OHS): ABNORMAL
SAMPLE: ABNORMAL
SAO2 % BLDA: 89 %
SAO2 % BLDA: 95 %
SAO2 % BLDA: 97 %
SAO2 % BLDA: 98.6 %
SODIUM BLD-SCNC: 135 MMOL/L (ref 136–145)
SODIUM BLD-SCNC: 135 MMOL/L (ref 136–145)
SODIUM BLD-SCNC: 136 MMOL/L (ref 136–145)
SODIUM BLD-SCNC: 136 MMOL/L (ref 136–145)
SODIUM BLD-SCNC: 137 MMOL/L (ref 136–145)
SODIUM BLD-SCNC: 138 MMOL/L (ref 136–145)
SODIUM BLOOD GAS (OHS): 133 MMOL/L (ref 137–145)
SODIUM BLOOD GAS (OHS): 135 MMOL/L (ref 137–145)
SODIUM BLOOD GAS (OHS): 136 MMOL/L (ref 137–145)
SODIUM BLOOD GAS (OHS): 136 MMOL/L (ref 137–145)
SODIUM SERPL-SCNC: 138 MMOL/L (ref 136–145)
SODIUM SERPL-SCNC: 139 MMOL/L (ref 136–145)
SPONT+MECH VT ON VENT: 500 ML
SPONT+MECH VT ON VENT: 500 ML
WBC # SPEC AUTO: 10.26 X10(3)/MCL (ref 4.5–11.5)
WBC # SPEC AUTO: 13.87 X10(3)/MCL (ref 4.5–11.5)
WBC # SPEC AUTO: 9.27 X10(3)/MCL (ref 4.5–11.5)

## 2024-03-25 PROCEDURE — 85027 COMPLETE CBC AUTOMATED: CPT | Performed by: PHYSICIAN ASSISTANT

## 2024-03-25 PROCEDURE — 85730 THROMBOPLASTIN TIME PARTIAL: CPT | Performed by: PHYSICIAN ASSISTANT

## 2024-03-25 PROCEDURE — D9220A PRA ANESTHESIA: Mod: ANES,,, | Performed by: ANESTHESIOLOGY

## 2024-03-25 PROCEDURE — 021109W BYPASS CORONARY ARTERY, TWO ARTERIES FROM AORTA WITH AUTOLOGOUS VENOUS TISSUE, OPEN APPROACH: ICD-10-PCS | Performed by: THORACIC SURGERY (CARDIOTHORACIC VASCULAR SURGERY)

## 2024-03-25 PROCEDURE — 85018 HEMOGLOBIN: CPT | Performed by: THORACIC SURGERY (CARDIOTHORACIC VASCULAR SURGERY)

## 2024-03-25 PROCEDURE — C1894 INTRO/SHEATH, NON-LASER: HCPCS | Performed by: THORACIC SURGERY (CARDIOTHORACIC VASCULAR SURGERY)

## 2024-03-25 PROCEDURE — 25000003 PHARM REV CODE 250

## 2024-03-25 PROCEDURE — 33518 CABG ARTERY-VEIN TWO: CPT | Mod: ,,, | Performed by: THORACIC SURGERY (CARDIOTHORACIC VASCULAR SURGERY)

## 2024-03-25 PROCEDURE — 99900026 HC AIRWAY MAINTENANCE (STAT)

## 2024-03-25 PROCEDURE — 25000003 PHARM REV CODE 250: Performed by: THORACIC SURGERY (CARDIOTHORACIC VASCULAR SURGERY)

## 2024-03-25 PROCEDURE — C1769 GUIDE WIRE: HCPCS | Performed by: THORACIC SURGERY (CARDIOTHORACIC VASCULAR SURGERY)

## 2024-03-25 PROCEDURE — 25000003 PHARM REV CODE 250: Performed by: PHYSICIAN ASSISTANT

## 2024-03-25 PROCEDURE — 85014 HEMATOCRIT: CPT | Performed by: PHYSICIAN ASSISTANT

## 2024-03-25 PROCEDURE — 93325 DOPPLER ECHO COLOR FLOW MAPG: CPT | Mod: 26,59,, | Performed by: ANESTHESIOLOGY

## 2024-03-25 PROCEDURE — 83735 ASSAY OF MAGNESIUM: CPT | Performed by: PHYSICIAN ASSISTANT

## 2024-03-25 PROCEDURE — 33533 CABG ARTERIAL SINGLE: CPT | Mod: ,,, | Performed by: THORACIC SURGERY (CARDIOTHORACIC VASCULAR SURGERY)

## 2024-03-25 PROCEDURE — 36000713 HC OR TIME LEV V EA ADD 15 MIN: Performed by: THORACIC SURGERY (CARDIOTHORACIC VASCULAR SURGERY)

## 2024-03-25 PROCEDURE — 02L70CK OCCLUSION OF LEFT ATRIAL APPENDAGE WITH EXTRALUMINAL DEVICE, OPEN APPROACH: ICD-10-PCS | Performed by: THORACIC SURGERY (CARDIOTHORACIC VASCULAR SURGERY)

## 2024-03-25 PROCEDURE — 27800903 OPTIME MED/SURG SUP & DEVICES OTHER IMPLANTS: Performed by: THORACIC SURGERY (CARDIOTHORACIC VASCULAR SURGERY)

## 2024-03-25 PROCEDURE — 85730 THROMBOPLASTIN TIME PARTIAL: CPT | Performed by: THORACIC SURGERY (CARDIOTHORACIC VASCULAR SURGERY)

## 2024-03-25 PROCEDURE — D9220A PRA ANESTHESIA: Mod: CRNA,,, | Performed by: NURSE ANESTHETIST, CERTIFIED REGISTERED

## 2024-03-25 PROCEDURE — 84100 ASSAY OF PHOSPHORUS: CPT | Performed by: PHYSICIAN ASSISTANT

## 2024-03-25 PROCEDURE — 99900035 HC TECH TIME PER 15 MIN (STAT)

## 2024-03-25 PROCEDURE — 20000000 HC ICU ROOM

## 2024-03-25 PROCEDURE — 37000008 HC ANESTHESIA 1ST 15 MINUTES: Performed by: THORACIC SURGERY (CARDIOTHORACIC VASCULAR SURGERY)

## 2024-03-25 PROCEDURE — 33533 CABG ARTERIAL SINGLE: CPT | Mod: AS,,, | Performed by: PHYSICIAN ASSISTANT

## 2024-03-25 PROCEDURE — 63600175 PHARM REV CODE 636 W HCPCS: Performed by: THORACIC SURGERY (CARDIOTHORACIC VASCULAR SURGERY)

## 2024-03-25 PROCEDURE — 99900031 HC PATIENT EDUCATION (STAT)

## 2024-03-25 PROCEDURE — 36556 INSERT NON-TUNNEL CV CATH: CPT | Mod: 59,,, | Performed by: ANESTHESIOLOGY

## 2024-03-25 PROCEDURE — P9047 ALBUMIN (HUMAN), 25%, 50ML: HCPCS | Mod: JG

## 2024-03-25 PROCEDURE — 94002 VENT MGMT INPAT INIT DAY: CPT

## 2024-03-25 PROCEDURE — 76937 US GUIDE VASCULAR ACCESS: CPT | Mod: 26,,, | Performed by: ANESTHESIOLOGY

## 2024-03-25 PROCEDURE — 80053 COMPREHEN METABOLIC PANEL: CPT | Performed by: PHYSICIAN ASSISTANT

## 2024-03-25 PROCEDURE — 93320 DOPPLER ECHO COMPLETE: CPT | Mod: 26,59,, | Performed by: ANESTHESIOLOGY

## 2024-03-25 PROCEDURE — 93312 ECHO TRANSESOPHAGEAL: CPT | Mod: 26,59,, | Performed by: ANESTHESIOLOGY

## 2024-03-25 PROCEDURE — 33518 CABG ARTERY-VEIN TWO: CPT | Mod: AS,,, | Performed by: PHYSICIAN ASSISTANT

## 2024-03-25 PROCEDURE — 94760 N-INVAS EAR/PLS OXIMETRY 1: CPT | Mod: XB

## 2024-03-25 PROCEDURE — 37000009 HC ANESTHESIA EA ADD 15 MINS: Performed by: THORACIC SURGERY (CARDIOTHORACIC VASCULAR SURGERY)

## 2024-03-25 PROCEDURE — 84132 ASSAY OF SERUM POTASSIUM: CPT | Performed by: PHYSICIAN ASSISTANT

## 2024-03-25 PROCEDURE — 0T7D8ZZ DILATION OF URETHRA, VIA NATURAL OR ARTIFICIAL OPENING ENDOSCOPIC: ICD-10-PCS | Performed by: UROLOGY

## 2024-03-25 PROCEDURE — 85610 PROTHROMBIN TIME: CPT | Performed by: PHYSICIAN ASSISTANT

## 2024-03-25 PROCEDURE — 37799 UNLISTED PX VASCULAR SURGERY: CPT

## 2024-03-25 PROCEDURE — P9016 RBC LEUKOCYTES REDUCED: HCPCS

## 2024-03-25 PROCEDURE — 5A1221Z PERFORMANCE OF CARDIAC OUTPUT, CONTINUOUS: ICD-10-PCS | Performed by: THORACIC SURGERY (CARDIOTHORACIC VASCULAR SURGERY)

## 2024-03-25 PROCEDURE — C1887 CATHETER, GUIDING: HCPCS | Performed by: THORACIC SURGERY (CARDIOTHORACIC VASCULAR SURGERY)

## 2024-03-25 PROCEDURE — 63600175 PHARM REV CODE 636 W HCPCS: Mod: JZ,JG

## 2024-03-25 PROCEDURE — A4216 STERILE WATER/SALINE, 10 ML: HCPCS | Performed by: NURSE ANESTHETIST, CERTIFIED REGISTERED

## 2024-03-25 PROCEDURE — 85610 PROTHROMBIN TIME: CPT | Performed by: THORACIC SURGERY (CARDIOTHORACIC VASCULAR SURGERY)

## 2024-03-25 PROCEDURE — C1747 OPTIME ENDOSCOPE, SINGLE, URINARY TRACT: HCPCS | Performed by: THORACIC SURGERY (CARDIOTHORACIC VASCULAR SURGERY)

## 2024-03-25 PROCEDURE — 27200966 HC CLOSED SUCTION SYSTEM

## 2024-03-25 PROCEDURE — 63600175 PHARM REV CODE 636 W HCPCS: Performed by: PHYSICIAN ASSISTANT

## 2024-03-25 PROCEDURE — 80048 BASIC METABOLIC PNL TOTAL CA: CPT | Performed by: THORACIC SURGERY (CARDIOTHORACIC VASCULAR SURGERY)

## 2024-03-25 PROCEDURE — C1729 CATH, DRAINAGE: HCPCS | Performed by: THORACIC SURGERY (CARDIOTHORACIC VASCULAR SURGERY)

## 2024-03-25 PROCEDURE — 33508 ENDOSCOPIC VEIN HARVEST: CPT | Mod: 59,,, | Performed by: THORACIC SURGERY (CARDIOTHORACIC VASCULAR SURGERY)

## 2024-03-25 PROCEDURE — 02100Z9 BYPASS CORONARY ARTERY, ONE ARTERY FROM LEFT INTERNAL MAMMARY, OPEN APPROACH: ICD-10-PCS | Performed by: THORACIC SURGERY (CARDIOTHORACIC VASCULAR SURGERY)

## 2024-03-25 PROCEDURE — 33508 ENDOSCOPIC VEIN HARVEST: CPT | Mod: AS,59,, | Performed by: PHYSICIAN ASSISTANT

## 2024-03-25 PROCEDURE — 06BQ4ZZ EXCISION OF LEFT SAPHENOUS VEIN, PERCUTANEOUS ENDOSCOPIC APPROACH: ICD-10-PCS | Performed by: THORACIC SURGERY (CARDIOTHORACIC VASCULAR SURGERY)

## 2024-03-25 PROCEDURE — 36000712 HC OR TIME LEV V 1ST 15 MIN: Performed by: THORACIC SURGERY (CARDIOTHORACIC VASCULAR SURGERY)

## 2024-03-25 PROCEDURE — 63600175 PHARM REV CODE 636 W HCPCS

## 2024-03-25 PROCEDURE — 27100171 HC OXYGEN HIGH FLOW UP TO 24 HOURS

## 2024-03-25 PROCEDURE — 82803 BLOOD GASES ANY COMBINATION: CPT

## 2024-03-25 PROCEDURE — 84132 ASSAY OF SERUM POTASSIUM: CPT | Performed by: THORACIC SURGERY (CARDIOTHORACIC VASCULAR SURGERY)

## 2024-03-25 PROCEDURE — 85025 COMPLETE CBC W/AUTO DIFF WBC: CPT | Performed by: THORACIC SURGERY (CARDIOTHORACIC VASCULAR SURGERY)

## 2024-03-25 PROCEDURE — C9248 INJ, CLEVIDIPINE BUTYRATE: HCPCS

## 2024-03-25 PROCEDURE — 27201423 OPTIME MED/SURG SUP & DEVICES STERILE SUPPLY: Performed by: THORACIC SURGERY (CARDIOTHORACIC VASCULAR SURGERY)

## 2024-03-25 PROCEDURE — S5010 5% DEXTROSE AND 0.45% SALINE: HCPCS | Performed by: PHYSICIAN ASSISTANT

## 2024-03-25 PROCEDURE — 63600175 PHARM REV CODE 636 W HCPCS: Performed by: NURSE ANESTHETIST, CERTIFIED REGISTERED

## 2024-03-25 PROCEDURE — 25000003 PHARM REV CODE 250: Performed by: NURSE ANESTHETIST, CERTIFIED REGISTERED

## 2024-03-25 PROCEDURE — 36620 INSERTION CATHETER ARTERY: CPT | Performed by: NURSE ANESTHETIST, CERTIFIED REGISTERED

## 2024-03-25 PROCEDURE — P9045 ALBUMIN (HUMAN), 5%, 250 ML: HCPCS | Mod: JZ,JG | Performed by: PHYSICIAN ASSISTANT

## 2024-03-25 RX ORDER — SUCRALFATE 1 G/1
1 TABLET ORAL
Status: DISCONTINUED | OUTPATIENT
Start: 2024-03-26 | End: 2024-04-05 | Stop reason: HOSPADM

## 2024-03-25 RX ORDER — ALBUMIN HUMAN 50 G/1000ML
12.5 SOLUTION INTRAVENOUS
Status: DISCONTINUED | OUTPATIENT
Start: 2024-03-25 | End: 2024-04-05 | Stop reason: HOSPADM

## 2024-03-25 RX ORDER — DOCUSATE SODIUM 100 MG/1
100 CAPSULE, LIQUID FILLED ORAL 2 TIMES DAILY
Status: DISCONTINUED | OUTPATIENT
Start: 2024-03-26 | End: 2024-04-05 | Stop reason: HOSPADM

## 2024-03-25 RX ORDER — DEXTROSE MONOHYDRATE AND SODIUM CHLORIDE 5; .45 G/100ML; G/100ML
INJECTION, SOLUTION INTRAVENOUS CONTINUOUS
Status: DISCONTINUED | OUTPATIENT
Start: 2024-03-25 | End: 2024-03-31

## 2024-03-25 RX ORDER — VASOPRESSIN 20 [USP'U]/ML
INJECTION, SOLUTION INTRAMUSCULAR; SUBCUTANEOUS
Status: DISCONTINUED | OUTPATIENT
Start: 2024-03-25 | End: 2024-03-25

## 2024-03-25 RX ORDER — PAPAVERINE HYDROCHLORIDE 30 MG/ML
INJECTION INTRAMUSCULAR; INTRAVENOUS
Status: DISCONTINUED | OUTPATIENT
Start: 2024-03-25 | End: 2024-03-25 | Stop reason: HOSPADM

## 2024-03-25 RX ORDER — METOCLOPRAMIDE HYDROCHLORIDE 5 MG/ML
5 INJECTION INTRAMUSCULAR; INTRAVENOUS EVERY 6 HOURS PRN
Status: DISCONTINUED | OUTPATIENT
Start: 2024-03-25 | End: 2024-04-05 | Stop reason: HOSPADM

## 2024-03-25 RX ORDER — LIDOCAINE HYDROCHLORIDE 20 MG/ML
2 INJECTION, SOLUTION INFILTRATION; PERINEURAL ONCE
Status: DISCONTINUED | OUTPATIENT
Start: 2024-03-25 | End: 2024-04-05 | Stop reason: HOSPADM

## 2024-03-25 RX ORDER — POTASSIUM CHLORIDE 14.9 MG/ML
40 INJECTION INTRAVENOUS
Status: DISCONTINUED | OUTPATIENT
Start: 2024-03-25 | End: 2024-04-05 | Stop reason: HOSPADM

## 2024-03-25 RX ORDER — PROTAMINE SULFATE 10 MG/ML
INJECTION, SOLUTION INTRAVENOUS
Status: DISCONTINUED | OUTPATIENT
Start: 2024-03-25 | End: 2024-03-25

## 2024-03-25 RX ORDER — KETOROLAC TROMETHAMINE 30 MG/ML
15 INJECTION, SOLUTION INTRAMUSCULAR; INTRAVENOUS EVERY 6 HOURS
Status: COMPLETED | OUTPATIENT
Start: 2024-03-25 | End: 2024-03-26

## 2024-03-25 RX ORDER — POTASSIUM CHLORIDE 14.9 MG/ML
20 INJECTION INTRAVENOUS
Status: DISCONTINUED | OUTPATIENT
Start: 2024-03-25 | End: 2024-04-05 | Stop reason: HOSPADM

## 2024-03-25 RX ORDER — CALCIUM GLUCONATE 20 MG/ML
3 INJECTION, SOLUTION INTRAVENOUS
Status: DISCONTINUED | OUTPATIENT
Start: 2024-03-25 | End: 2024-04-05 | Stop reason: HOSPADM

## 2024-03-25 RX ORDER — METOPROLOL TARTRATE 1 MG/ML
INJECTION, SOLUTION INTRAVENOUS
Status: DISCONTINUED | OUTPATIENT
Start: 2024-03-25 | End: 2024-03-25

## 2024-03-25 RX ORDER — LIDOCAINE HYDROCHLORIDE 20 MG/ML
INJECTION, SOLUTION EPIDURAL; INFILTRATION; INTRACAUDAL; PERINEURAL
Status: DISCONTINUED | OUTPATIENT
Start: 2024-03-25 | End: 2024-03-25

## 2024-03-25 RX ORDER — TRANEXAMIC ACID 100 MG/ML
INJECTION, SOLUTION INTRAVENOUS
Status: DISCONTINUED | OUTPATIENT
Start: 2024-03-25 | End: 2024-03-25

## 2024-03-25 RX ORDER — LOPERAMIDE HYDROCHLORIDE 2 MG/1
2 CAPSULE ORAL CONTINUOUS PRN
Status: DISCONTINUED | OUTPATIENT
Start: 2024-03-25 | End: 2024-04-05 | Stop reason: HOSPADM

## 2024-03-25 RX ORDER — HEPARIN SODIUM 1000 [USP'U]/ML
INJECTION, SOLUTION INTRAVENOUS; SUBCUTANEOUS
Status: DISCONTINUED | OUTPATIENT
Start: 2024-03-25 | End: 2024-03-25 | Stop reason: HOSPADM

## 2024-03-25 RX ORDER — ACETAMINOPHEN 650 MG/20.3ML
650 LIQUID ORAL EVERY 6 HOURS PRN
Status: DISCONTINUED | OUTPATIENT
Start: 2024-03-25 | End: 2024-04-05 | Stop reason: HOSPADM

## 2024-03-25 RX ORDER — DEXMEDETOMIDINE HYDROCHLORIDE 4 UG/ML
0-1.4 INJECTION, SOLUTION INTRAVENOUS CONTINUOUS
Status: DISCONTINUED | OUTPATIENT
Start: 2024-03-25 | End: 2024-03-26

## 2024-03-25 RX ORDER — PHENYLEPHRINE HYDROCHLORIDE 10 MG/ML
INJECTION INTRAVENOUS
Status: DISCONTINUED | OUTPATIENT
Start: 2024-03-25 | End: 2024-03-25

## 2024-03-25 RX ORDER — ASPIRIN 81 MG/1
81 TABLET ORAL DAILY
Status: DISCONTINUED | OUTPATIENT
Start: 2024-03-26 | End: 2024-04-05 | Stop reason: HOSPADM

## 2024-03-25 RX ORDER — ETOMIDATE 2 MG/ML
INJECTION INTRAVENOUS
Status: DISCONTINUED | OUTPATIENT
Start: 2024-03-25 | End: 2024-03-25

## 2024-03-25 RX ORDER — SODIUM CHLORIDE, SODIUM GLUCONATE, SODIUM ACETATE, POTASSIUM CHLORIDE AND MAGNESIUM CHLORIDE 30; 37; 368; 526; 502 MG/100ML; MG/100ML; MG/100ML; MG/100ML; MG/100ML
INJECTION, SOLUTION INTRAVENOUS CONTINUOUS
Status: DISCONTINUED | OUTPATIENT
Start: 2024-03-25 | End: 2024-03-26

## 2024-03-25 RX ORDER — METOPROLOL TARTRATE 25 MG/1
12.5 TABLET ORAL 2 TIMES DAILY
Status: DISCONTINUED | OUTPATIENT
Start: 2024-03-26 | End: 2024-03-25

## 2024-03-25 RX ORDER — MIDAZOLAM HYDROCHLORIDE 1 MG/ML
INJECTION INTRAMUSCULAR; INTRAVENOUS
Status: DISCONTINUED | OUTPATIENT
Start: 2024-03-25 | End: 2024-03-25

## 2024-03-25 RX ORDER — CEFAZOLIN SODIUM 2 G/50ML
2 SOLUTION INTRAVENOUS
Status: COMPLETED | OUTPATIENT
Start: 2024-03-25 | End: 2024-03-26

## 2024-03-25 RX ORDER — HEPARIN SODIUM 1000 [USP'U]/ML
INJECTION, SOLUTION INTRAVENOUS; SUBCUTANEOUS
Status: DISCONTINUED | OUTPATIENT
Start: 2024-03-25 | End: 2024-03-25

## 2024-03-25 RX ORDER — LACTULOSE 10 G/15ML
20 SOLUTION ORAL EVERY 6 HOURS PRN
Status: DISCONTINUED | OUTPATIENT
Start: 2024-03-25 | End: 2024-04-05 | Stop reason: HOSPADM

## 2024-03-25 RX ORDER — OXYCODONE HYDROCHLORIDE 10 MG/1
10 TABLET ORAL EVERY 4 HOURS PRN
Status: DISCONTINUED | OUTPATIENT
Start: 2024-03-25 | End: 2024-04-05 | Stop reason: HOSPADM

## 2024-03-25 RX ORDER — ONDANSETRON 2 MG/ML
INJECTION INTRAMUSCULAR; INTRAVENOUS
Status: DISCONTINUED | OUTPATIENT
Start: 2024-03-25 | End: 2024-03-25

## 2024-03-25 RX ORDER — ENOXAPARIN SODIUM 100 MG/ML
40 INJECTION SUBCUTANEOUS EVERY 24 HOURS
Status: DISCONTINUED | OUTPATIENT
Start: 2024-03-26 | End: 2024-03-29

## 2024-03-25 RX ORDER — HYDROCODONE BITARTRATE AND ACETAMINOPHEN 5; 325 MG/1; MG/1
1 TABLET ORAL EVERY 4 HOURS PRN
Status: DISCONTINUED | OUTPATIENT
Start: 2024-03-25 | End: 2024-04-05 | Stop reason: HOSPADM

## 2024-03-25 RX ORDER — FENTANYL CITRATE 50 UG/ML
INJECTION, SOLUTION INTRAMUSCULAR; INTRAVENOUS
Status: DISCONTINUED | OUTPATIENT
Start: 2024-03-25 | End: 2024-03-25

## 2024-03-25 RX ORDER — CALCIUM GLUCONATE 20 MG/ML
2 INJECTION, SOLUTION INTRAVENOUS
Status: DISCONTINUED | OUTPATIENT
Start: 2024-03-25 | End: 2024-04-05 | Stop reason: HOSPADM

## 2024-03-25 RX ORDER — CALCIUM GLUCONATE 20 MG/ML
1 INJECTION, SOLUTION INTRAVENOUS
Status: DISCONTINUED | OUTPATIENT
Start: 2024-03-25 | End: 2024-04-05 | Stop reason: HOSPADM

## 2024-03-25 RX ORDER — PROPOFOL 10 MG/ML
VIAL (ML) INTRAVENOUS
Status: DISCONTINUED | OUTPATIENT
Start: 2024-03-25 | End: 2024-03-25

## 2024-03-25 RX ORDER — MUPIROCIN 20 MG/G
OINTMENT TOPICAL 2 TIMES DAILY
Status: ACTIVE | OUTPATIENT
Start: 2024-03-25 | End: 2024-03-27

## 2024-03-25 RX ORDER — FAMOTIDINE 10 MG/ML
20 INJECTION INTRAVENOUS DAILY
Status: DISCONTINUED | OUTPATIENT
Start: 2024-03-25 | End: 2024-03-26

## 2024-03-25 RX ORDER — ROCURONIUM BROMIDE 10 MG/ML
INJECTION, SOLUTION INTRAVENOUS
Status: DISCONTINUED | OUTPATIENT
Start: 2024-03-25 | End: 2024-03-25

## 2024-03-25 RX ORDER — ONDANSETRON HYDROCHLORIDE 2 MG/ML
4 INJECTION, SOLUTION INTRAVENOUS EVERY 4 HOURS PRN
Status: DISCONTINUED | OUTPATIENT
Start: 2024-03-25 | End: 2024-04-05 | Stop reason: HOSPADM

## 2024-03-25 RX ORDER — MORPHINE SULFATE 10 MG/ML
4 INJECTION INTRAMUSCULAR; INTRAVENOUS; SUBCUTANEOUS EVERY 4 HOURS PRN
Status: DISCONTINUED | OUTPATIENT
Start: 2024-03-25 | End: 2024-04-05 | Stop reason: HOSPADM

## 2024-03-25 RX ORDER — MAGNESIUM SULFATE HEPTAHYDRATE 40 MG/ML
4 INJECTION, SOLUTION INTRAVENOUS
Status: DISCONTINUED | OUTPATIENT
Start: 2024-03-25 | End: 2024-04-05 | Stop reason: HOSPADM

## 2024-03-25 RX ORDER — MAGNESIUM SULFATE HEPTAHYDRATE 40 MG/ML
2 INJECTION, SOLUTION INTRAVENOUS
Status: DISCONTINUED | OUTPATIENT
Start: 2024-03-25 | End: 2024-04-05 | Stop reason: HOSPADM

## 2024-03-25 RX ORDER — FOLIC ACID 1 MG/1
1 TABLET ORAL DAILY
Status: DISCONTINUED | OUTPATIENT
Start: 2024-03-26 | End: 2024-04-05 | Stop reason: HOSPADM

## 2024-03-25 RX ADMIN — VASOPRESSIN 1 UNITS: 20 INJECTION INTRAVENOUS at 10:03

## 2024-03-25 RX ADMIN — TRANEXAMIC ACID 500 MG: 100 INJECTION, SOLUTION INTRAVENOUS at 10:03

## 2024-03-25 RX ADMIN — FENTANYL CITRATE 200 MCG: 50 INJECTION, SOLUTION INTRAMUSCULAR; INTRAVENOUS at 06:03

## 2024-03-25 RX ADMIN — DEXTROSE MONOHYDRATE 1.5 G: 50 INJECTION, SOLUTION INTRAVENOUS at 08:03

## 2024-03-25 RX ADMIN — LIDOCAINE HYDROCHLORIDE 80 MG: 20 INJECTION, SOLUTION EPIDURAL; INFILTRATION; INTRACAUDAL; PERINEURAL at 06:03

## 2024-03-25 RX ADMIN — DEXMEDETOMIDINE 0.6 MCG/KG/HR: 200 INJECTION, SOLUTION INTRAVENOUS at 09:03

## 2024-03-25 RX ADMIN — SODIUM CHLORIDE 2 UNITS/HR: 9 INJECTION, SOLUTION INTRAVENOUS at 09:03

## 2024-03-25 RX ADMIN — FENTANYL CITRATE 50 MCG: 50 INJECTION, SOLUTION INTRAMUSCULAR; INTRAVENOUS at 06:03

## 2024-03-25 RX ADMIN — EPINEPHRINE 0.02 MCG/KG/MIN: 1 INJECTION INTRAMUSCULAR; INTRAVENOUS; SUBCUTANEOUS at 04:03

## 2024-03-25 RX ADMIN — HEPARIN SODIUM 20000 UNITS: 1000 INJECTION, SOLUTION INTRAVENOUS; SUBCUTANEOUS at 08:03

## 2024-03-25 RX ADMIN — SODIUM CHLORIDE: 9 INJECTION, SOLUTION INTRAVENOUS at 06:03

## 2024-03-25 RX ADMIN — EPINEPHRINE 0.02 MCG/KG/MIN: 1 INJECTION INTRAMUSCULAR; INTRAVENOUS; SUBCUTANEOUS at 09:03

## 2024-03-25 RX ADMIN — MIDAZOLAM HYDROCHLORIDE 1.5 MG: 1 INJECTION, SOLUTION INTRAMUSCULAR; INTRAVENOUS at 09:03

## 2024-03-25 RX ADMIN — ONDANSETRON 500 MG: 2 INJECTION INTRAMUSCULAR; INTRAVENOUS at 10:03

## 2024-03-25 RX ADMIN — SODIUM CHLORIDE, SODIUM GLUCONATE, SODIUM ACETATE, POTASSIUM CHLORIDE AND MAGNESIUM CHLORIDE: 526; 502; 368; 37; 30 INJECTION, SOLUTION INTRAVENOUS at 04:03

## 2024-03-25 RX ADMIN — MIDAZOLAM HYDROCHLORIDE 1 MG: 1 INJECTION, SOLUTION INTRAMUSCULAR; INTRAVENOUS at 09:03

## 2024-03-25 RX ADMIN — PHENYLEPHRINE HYDROCHLORIDE 100 MCG: 10 INJECTION INTRAVENOUS at 08:03

## 2024-03-25 RX ADMIN — MORPHINE SULFATE 2 MG: 4 INJECTION, SOLUTION INTRAMUSCULAR; INTRAVENOUS at 12:03

## 2024-03-25 RX ADMIN — CEFAZOLIN SODIUM 2 G: 2 SOLUTION INTRAVENOUS at 08:03

## 2024-03-25 RX ADMIN — SODIUM CHLORIDE, SODIUM GLUCONATE, SODIUM ACETATE, POTASSIUM CHLORIDE AND MAGNESIUM CHLORIDE: 526; 502; 368; 37; 30 INJECTION, SOLUTION INTRAVENOUS at 03:03

## 2024-03-25 RX ADMIN — ACETAMINOPHEN 650 MG: 650 SOLUTION ORAL at 04:03

## 2024-03-25 RX ADMIN — FAMOTIDINE 20 MG: 10 INJECTION, SOLUTION INTRAVENOUS at 02:03

## 2024-03-25 RX ADMIN — ROCURONIUM BROMIDE 30 MG: 10 SOLUTION INTRAVENOUS at 08:03

## 2024-03-25 RX ADMIN — METOPROLOL TARTRATE 2.5 MG: 1 INJECTION, SOLUTION INTRAVENOUS at 08:03

## 2024-03-25 RX ADMIN — MIDAZOLAM HYDROCHLORIDE 2.5 MG: 1 INJECTION, SOLUTION INTRAMUSCULAR; INTRAVENOUS at 06:03

## 2024-03-25 RX ADMIN — ROCURONIUM BROMIDE 25 MG: 10 SOLUTION INTRAVENOUS at 09:03

## 2024-03-25 RX ADMIN — DEXTROSE AND SODIUM CHLORIDE: 5; 450 INJECTION, SOLUTION INTRAVENOUS at 11:03

## 2024-03-25 RX ADMIN — METOPROLOL TARTRATE 2 MG: 1 INJECTION, SOLUTION INTRAVENOUS at 06:03

## 2024-03-25 RX ADMIN — ROCURONIUM BROMIDE 70 MG: 10 SOLUTION INTRAVENOUS at 06:03

## 2024-03-25 RX ADMIN — TRANEXAMIC ACID 500 MG: 100 INJECTION, SOLUTION INTRAVENOUS at 07:03

## 2024-03-25 RX ADMIN — FENTANYL CITRATE 250 MCG: 50 INJECTION, SOLUTION INTRAMUSCULAR; INTRAVENOUS at 08:03

## 2024-03-25 RX ADMIN — KETOROLAC TROMETHAMINE 15 MG: 30 INJECTION, SOLUTION INTRAMUSCULAR at 01:03

## 2024-03-25 RX ADMIN — CLEVIPIDINE 8 MG/HR: 0.5 EMULSION INTRAVENOUS at 01:03

## 2024-03-25 RX ADMIN — MORPHINE SULFATE 2 MG: 4 INJECTION, SOLUTION INTRAMUSCULAR; INTRAVENOUS at 08:03

## 2024-03-25 RX ADMIN — MUPIROCIN: 20 OINTMENT TOPICAL at 08:03

## 2024-03-25 RX ADMIN — DEXTROSE AND SODIUM CHLORIDE: 5; 450 INJECTION, SOLUTION INTRAVENOUS at 01:03

## 2024-03-25 RX ADMIN — INSULIN HUMAN 3 UNITS/HR: 1 INJECTION, SOLUTION INTRAVENOUS at 11:03

## 2024-03-25 RX ADMIN — MUPIROCIN: 20 OINTMENT TOPICAL at 01:03

## 2024-03-25 RX ADMIN — KETOROLAC TROMETHAMINE 15 MG: 30 INJECTION, SOLUTION INTRAMUSCULAR at 06:03

## 2024-03-25 RX ADMIN — PROTAMINE SULFATE 350 MG: 10 INJECTION, SOLUTION INTRAVENOUS at 10:03

## 2024-03-25 RX ADMIN — Medication 50 MG: at 06:03

## 2024-03-25 RX ADMIN — ALBUMIN (HUMAN) 12.5 G: 2.5 SOLUTION INTRAVENOUS at 04:03

## 2024-03-25 RX ADMIN — KETOROLAC TROMETHAMINE 15 MG: 30 INJECTION, SOLUTION INTRAMUSCULAR at 11:03

## 2024-03-25 RX ADMIN — ETOMIDATE 20 MG: 2 INJECTION INTRAVENOUS at 06:03

## 2024-03-25 RX ADMIN — POTASSIUM CHLORIDE 20 MEQ: 14.9 INJECTION, SOLUTION INTRAVENOUS at 06:03

## 2024-03-25 RX ADMIN — Medication 20 MG: at 06:03

## 2024-03-25 RX ADMIN — DEXMEDETOMIDINE HYDROCHLORIDE 0.4 MCG/KG/HR: 400 INJECTION INTRAVENOUS at 01:03

## 2024-03-25 NOTE — ANESTHESIA PROCEDURE NOTES
MANI    Diagnosis: cad, cabg  Patient location during procedure: OR  Timeout: 3/25/2024 7:30 AM  Procedure end time: 3/25/2024 5:40 PM  Exam type: Baseline    Staffing  Performed: anesthesiologist     Anesthesiologist: Sharad Yadav MD        Anesthesiologist Present  Yes      Setup & Induction  Patient preparation: bite block inserted  Probe Insertion: easy  Exam: completeDoppler Echo: 2D, pulse wave Doppler and continuous wave Doppler.  Exam     Left Heart  Left Atruim: normal    Left Ventricle: cm, normal (men 4.2-5.9; women 3.8-5.2)  LV Wall Thickness (posterior wall):cm, mildly abnormal (men 1.1-1.3 cm; women 1.0-1.2 cm)    LVAD  Estimated Ejection Fraction: 35-44% moderate            Right Heart  Right Atria:  normal    Intra Atrial Septum          Right Ventricle, Free Wall Thickness: normal    Aortic Valve:  Morphology: trileaflet      Mitral Valve:   Morphology:normal  Jet Description: none    Tricuspid Valve:  Morphology: normal  Regurgitation: mild    Pulmonic Valve:  Morphology:normal  Regurgitation(color flow): mild    Great Vessels  Ascending Aorta Atherosclerosis: 1=nl-min dz  Aortic Arch Atherosclerosis: 3=sessile dz (3-5mm)  IABP: no  Descending Aorta Atherosclerosis: 3=sessile dz (3-5mm)  Aorta    Descending aorta IABP: no    Effusions pericardial    SummaryFindings discussed with surgeon.    Other Findings

## 2024-03-25 NOTE — ANESTHESIA PROCEDURE NOTES
Arterial    Diagnosis: Blood Pressure Monitoring Needed    Patient location during procedure: holding area  Timeout: 3/25/2024 6:39 AM  Procedure end time: 3/25/2024 6:42 AM    Staffing  Authorizing Provider: Sharad Yadav MD  Performing Provider: Dabadie, Virginia G, CRNA    Staffing  Performed by: Dabadie, Virginia G, CRNA  Authorized by: Sharad Yadav MD      Preanesthetic Checklist  Completed: patient identified, IV checked, site marked, risks and benefits discussed, surgical consent, monitors and equipment checked, pre-op evaluation, timeout performed and anesthesia consent givenArterial  Skin Prep: chlorhexidine gluconate  Local Infiltration: lidocaine  Orientation: right  Location: radial    Catheter Size: 20 G  Catheter placement by Anatomical landmarks. Heme positive aspiration all ports. Insertion Attempts: 1  Assessment  Dressing: secured with tape and tegaderm  Patient: Tolerated well

## 2024-03-25 NOTE — ANESTHESIA PROCEDURE NOTES
MANI    Diagnosis: cabg, post  Patient location during procedure: OR  Timeout: 3/25/2024 10:30 AM  Procedure end time: 3/25/2024 10:39 AM  Exam type: Final    Staffing  Performed: anesthesiologist     Anesthesiologist: Sharad Yadav MD        Anesthesiologist Present  Yes      Setup & Induction  Exam: limitedDoppler Echo: 2D.  Exam     Left Heart  Left Atruim: normal    Left Ventricle: cm, mildy abnormal (men 6.0-6.3; women 5.3-5.6)  LV Wall Thickness (posterior wall):cm, mildly abnormal (men 1.1-1.3 cm; women 1.0-1.2 cm)    LVAD  Regional Wall Abnormalities: RWMA present    Regional Wall Abnormalities    Four Chamber ME   Two Chamber ME   Longitudinal ME  TG Transversal MP  Inferoseptal: 2  Inferior: 2  Inferolateral: 2  Anteroseptal: 2  Anterolateral: 1  TG Transversal Basal  Inferoseptal: 2  Inferior: 2  Inferolateral: 2  Anteroseptal: 2  Anterior: 1  Anterolateral: 1        Right Heart  Right Atria:  normal    Intra Atrial Septum          Right Ventricle

## 2024-03-25 NOTE — OP NOTE
PATIENT NAME:      DIPIKA ARAGON   YOB: 1955  CSN:               [id number]  MRN:               [id number]  ADMIT DATE:        03/21/2024 08:39:00  PHYSICIAN:         Robert Blas MD                          OPERATIVE REPORT      DATE OF SURGERY:    03/25/2024 00:00:00    SURGEON:  Robert Blas MD    PREOPERATIVE DIAGNOSIS:  Likely urethral stricture disease, inability to place   Celeste.    POSTOPERATIVE DIAGNOSIS:  Urethral stricture disease.    PROCEDURES:  Cystoscopy and dilatation of urethral stricture.    PROCEDURE IN DETAIL:  The patient was already placed under anesthesia.  He was   prepped and draped.  A flexible cystoscope was inserted at the junction between   the bulbar and penile urethra.  He was noted to have about a 3-Macedonian stricture.    I was able to negotiate a Glidewire through this into the bladder, and then,   use Islas sounds to dilate up to 24-Macedonian.  We then placed a 20-Macedonian   Councill tip catheter over the Glidewire into the bladder and removed the   Glidewire with chandan urine return.  The balloon was inflated.  He was connected   to drainage.  He tolerated the procedure well and went on to have his bypass.        ______________________________  Robert Blas MD    JJT/AQS  DD:  03/25/2024  Time:  08:12AM  DT:  03/25/2024  Time:  08:59AM  Job #:  [id number]      OPERATIVE REPORT

## 2024-03-25 NOTE — ANESTHESIA POSTPROCEDURE EVALUATION
Anesthesia Post Evaluation    Patient: Ata Pickens    Procedure(s) Performed: Procedure(s) (LRB):  CORONARY ARTERY BYPASS GRAFT (CABG) (N/A)  SURGICAL PROCUREMENT, VEIN, ENDOSCOPIC (Left)  EXCLUSION, LEFT ATRIAL APPENDAGE (N/A)    Final Anesthesia Type: general      Patient location during evaluation: ICU  Patient participation: No - Unable to Participate, Intubation  Level of consciousness: sedated  Post-procedure vital signs: reviewed and stable  Pain management: adequate  Airway patency: patent  JUAN M mitigation strategies: Postoperative administration of CPAP, nasopharyngeal airway, or oral appliance in the postanesthesia care unit (PACU)  PONV status at discharge: No PONV  Anesthetic complications: no      Cardiovascular status: hemodynamically stable  Respiratory status: intubated  Hydration status: euvolemic  Follow-up not needed.          Vitals Value Taken Time   /79 03/25/24 1416   Temp 36 °C (96.8 °F) 03/25/24 1151   Pulse 70 03/25/24 1417   Resp 20 03/25/24 1417   SpO2 96 % 03/25/24 1417   Vitals shown include unvalidated device data.      No case tracking events are documented in the log.      Pain/Randee Score: Pain Rating Prior to Med Admin: 0 (3/25/2024  1:19 PM)

## 2024-03-25 NOTE — PROGRESS NOTES
[facility] - 6th Floor [facility] Telemetry    Cardiology  Progress Note    Patient Name: Ata Pickens  MRN: [id number]  Admission Date: 3/21/2024  Hospital Length of Stay: 4 days  Code Status: No Order   Attending Physician: Monse Lima MD   Primary Care Physician: Lorraine, Primary Doctor  Expected Discharge Date:   Principal Problem:<principal problem not specified>    Subjective:   Chief Complaint:  Outpatient Cath- MV CAD- CABG      HPI:   Mr. Pickens is a 68 year old male, known to Dr. Lima, who presented to the hospital and underwent elective coronary angiogram due to diagnosis of abnormal stress test which was performed in the outpatient setting due to reported history of chest pain, SOB, and fatigue with minimal exertion. He underwent cath on 3.21.24 revealing MV CAD including 90% distal LM/Ostial LAD Disease. Patient's EF on LV Gram noted to be 50%. Patient was admitted to [facility] CT Surgical services consulted for CABG evaluation.    Hospital Course:  3.22.24: NAD Noted. SB on Tele. BP Stable. CP This AM, Went away on its own. No CP Currently. Right Radial Site soft.  3.23.24: NAD Noted. SR on Tele. BP Stable. On Heparin Infusion.  3.24.24: NAD Noted. Vitals Stable. SR on Tele.  3.25.24: NAD Noted. CABG Today. Tolerated well. SR on Tele. On Cleviprex Infusion. CO/CI 4.9/3.3.      PMH: Hypertension, Claudication, SOB, CP, Chronic Tobacco Use  PSH: Wrist Surgery, Colostomy Closure  Family History: Father- Lung Cancer, Mother- Lung Cancer, Brother- Cardiac Pacemaker  Social History: Tobacco- Active Smoker, Alcohol- Negative, Substance Abuse- Negative     Previous Cardiac Diagnostics:   CABG (3.25.24):  Procedure:  Coronary artery bypass grafting X 3 ,   Left internal mammary artery to the LAD   Saphenous venous graft to   OM  Saphenous venous graft to    distal RCA  Ligation of left atrial appendage with a 35 mm atrial clip  Endoscopic venous harvesting left greater saphenous  vein    Coronary Angiogram (3.21.24):  Coronary findings:  Dominance: right   Left main:  Calcified distal 90% stenosis extending ostium of the LAD.    Left anterior descending artery:  Type three-vessel with calcified ostial stenosis of at least 70%.    Circumflex artery:  Nondominant calcified vessel with proximal 40-50% stenosis.  The circumflex gives rise to a large patent bifurcating OM.  Right coronary artery:  Calcified dominant vessel with serial heavily calcified segment stenosis of up to 80-90%.  The RCA terminates in a moderate bifurcating PDA and a small PL segment.    Selective LIMA angiogram:  Large widely patent vessel  Aortic root angiogram:  Calcified Type 3 bovine arch with patent innominate and proximal ICA bilateral.  Abdominal angiogram: No AAA.  Left iliac system patent.  Left common femoral artery patent proximally.  Right common and internal iliac patent.  Right external iliac with 70-80% stenosis at the pelvic brim.  Right common femoral occluded.  Left ventriculography:  EF- 50%.    Hemodynamics:LV/AO= 0 mmHg                      LVEDP= 13-16 mmHg       Carotid US (3.21.24):  The right internal carotid artery demonstrated 50-69% stenosis.   The left internal carotid artery demonstrated less than 50% stenosis.  Bilateral vertebral arteries were patent with antegrade flow.     Echocardiogram (3.5.24):  The left ventricle is decreased in size. Global left ventricular systolic function is borderline normal. The left ventricular ejection fraction is 50%. The left ventricle diastolic function is impaired (Grade I) with normal left atrial pressure.   The right atrium is moderately enlarged ~5.1 cm.  Mild (1+) tricuspid and trace mitral regurgitation.  The pulmonary artery systolic pressure is 23 mmHg.  The study quality is below average.      CV US Arterial Lower Extremities (3.5.24):  The study quality is good.   The right proximal superficial femoral artery is occluded.   The right mid and  distal superficial femoral, posterior tibial, peroneal, anterior tibial, and dorsalis pedis arteries exhibit poor, diminished perfusion via collateral flow.  The right lower extremity arteries exhibit mono-phasic waveforms.  The left external iliac and common femoral arteries exhibit mono-phasic waveforms.  The remaining arteries of the left lower extremity exhibit bi-phasic waveforms.      PET (3.1.24):  This is an abnormal perfusion study. Study is consistent with significant anterior  ischemia.   This scan is suggestive of moderate to high risk for future cardiovascular events.   Large partially reversible perfusion abnormality of severe intensity in the anterior apical region. Large partially reversible perfusion abnormality of severe intensity in the anterior septal region. Small reversible perfusion abnormality of severe intensity in the apical lateral segment.   The left ventricular cavity is noted to be normal on the stress studies. The stress left ventricular ejection fraction was calculated to be 37% and left ventricular global function is moderately reduced. The rest left ventricular cavity is noted to be normal. The rest left ventricular ejection fraction was calculated to be 52% and rest left ventricular global function is normal.   Hypokinesis of apical anterior segment is noted only in the stress studies which is suggestive of new ischemia. Persistent hypokinesis of the septal region is noted in both rest and stress studies. When compared to the resting ejection fraction (52%), the stress ejection fraction (37%) has decreased.   Transient ischemic dilatation is present and has been described as a marker for high risk coronary artery disease. It has also been described in microvascular disease, hypertensive heart disease as well as cardiac deconditioning.   The study quality is good.   There was no rise in myocardial blood flow between rest and stress.  Global myocardial blood flow reserve was 0.83.   Myocardial blood flow reserve is globally abnormal, placing the patient at a higher coronary event risk.    Review of Systems   Unable to perform ROS: Intubated     Objective:     Vital Signs (Most Recent):  Temp: 97.8 °F (36.6 °C) (03/25/24 0432)  Pulse: 79 (03/25/24 0610)  Resp: 18 (03/25/24 0610)  BP: 139/79 (03/25/24 0610)  SpO2: 97 % (03/25/24 0610) Vital Signs (24h Range):  Temp:  [97.4 °F (36.3 °C)-98.3 °F (36.8 °C)] 97.8 °F (36.6 °C)  Pulse:  [67-86] 79  Resp:  [18] 18  SpO2:  [94 %-98 %] 97 %  BP: (117-184)/(69-87) 139/79   Weight: 47.3 kg (104 lb 4.4 oz)  Body mass index is 17.9 kg/m².  SpO2: 97 %       Intake/Output Summary (Last 24 hours) at 3/25/2024 1009  Last data filed at 3/25/2024 0900  Gross per 24 hour   Intake 840 ml   Output 1480 ml   Net -640 ml       Lines/Drains/Airways       Central Venous Catheter Line  Duration             Percutaneous Central Line - Triple Lumen  03/25/24 0608 right internal jugular <1 day              Drain  Duration                  NG/OG Tube 03/25/24 0608 Craig sump;orogastric 18 Fr. Center mouth <1 day         Urethral Catheter 03/25/24  18 Fr. <1 day              Airway  Duration                  Airway - Non-Surgical 03/25/24 0651 <1 day              Arterial Line  Duration             Arterial Line 03/25/24 0639 Right Radial <1 day              Peripheral Intravenous Line  Duration                  Peripheral IV - Single Lumen 03/21/24 0900 20 G Anterior;Left Forearm 4 days         Peripheral IV - Single Lumen 03/22/24 2100 20 G Left Forearm 2 days                  Significant Labs:   Recent Results (from the past 72 hour(s))   EKG 12-lead    Collection Time: 03/22/24  7:21 PM   Result Value Ref Range    QRS Duration 72 ms    [facility] QTC Calculation 426 ms   APTT    Collection Time: 03/23/24  7:53 AM   Result Value Ref Range    PTT 52.5 (H) 23.2 - 33.7 seconds   CBC with Differential    Collection Time: 03/23/24  7:53 AM   Result Value Ref Range    WBC 8.15 4.50 -  11.50 x10(3)/mcL    RBC 3.86 (L) 4.70 - 6.10 x10(6)/mcL    Hgb 12.2 (L) 14.0 - 18.0 g/dL    Hct 36.2 (L) 42.0 - 52.0 %    MCV 93.8 80.0 - 94.0 fL    MCH 31.6 (H) 27.0 - 31.0 pg    MCHC 33.7 33.0 - 36.0 g/dL    RDW 13.3 11.5 - 17.0 %    Platelet 250 130 - 400 x10(3)/mcL    MPV 10.1 7.4 - 10.4 fL    Neut % 68.4 %    Lymph % 20.7 %    Mono % 6.9 %    Eos % 2.6 %    Basophil % 1.2 %    Lymph # 1.69 0.6 - 4.6 x10(3)/mcL    Neut # 5.57 2.1 - 9.2 x10(3)/mcL    Mono # 0.56 0.1 - 1.3 x10(3)/mcL    Eos # 0.21 0 - 0.9 x10(3)/mcL    Baso # 0.10 <=0.2 x10(3)/mcL    IG# 0.02 0 - 0.04 x10(3)/mcL    IG% 0.2 %    NRBC% 0.0 %   APTT    Collection Time: 03/23/24  3:27 PM   Result Value Ref Range    PTT 68.3 (H) 23.2 - 33.7 seconds   APTT    Collection Time: 03/23/24  9:15 PM   Result Value Ref Range    PTT 69.2 (H) 23.2 - 33.7 seconds   APTT    Collection Time: 03/24/24  4:34 AM   Result Value Ref Range    PTT 69.1 (H) 23.2 - 33.7 seconds   Basic Metabolic Panel    Collection Time: 03/24/24  4:34 AM   Result Value Ref Range    Sodium Level 140 136 - 145 mmol/L    Potassium Level 4.2 3.5 - 5.1 mmol/L    Chloride 104 98 - 107 mmol/L    Carbon Dioxide 26 23 - 31 mmol/L    Glucose Level 116 (H) 82 - 115 mg/dL    Blood Urea Nitrogen 16.2 8.4 - 25.7 mg/dL    Creatinine 0.87 0.73 - 1.18 mg/dL    BUN/Creatinine Ratio 19     Calcium Level Total 9.9 8.8 - 10.0 mg/dL    Anion Gap 10.0 mEq/L    eGFR >60 mls/min/1.73/m2   Magnesium    Collection Time: 03/24/24  4:34 AM   Result Value Ref Range    Magnesium Level 1.90 1.60 - 2.60 mg/dL   CBC with Differential    Collection Time: 03/24/24  4:34 AM   Result Value Ref Range    WBC 8.68 4.50 - 11.50 x10(3)/mcL    RBC 4.06 (L) 4.70 - 6.10 x10(6)/mcL    Hgb 12.7 (L) 14.0 - 18.0 g/dL    Hct 37.7 (L) 42.0 - 52.0 %    MCV 92.9 80.0 - 94.0 fL    MCH 31.3 (H) 27.0 - 31.0 pg    MCHC 33.7 33.0 - 36.0 g/dL    RDW 13.2 11.5 - 17.0 %    Platelet 265 130 - 400 x10(3)/mcL    MPV 10.0 7.4 - 10.4 fL    Neut % 64.8 %     Lymph % 22.5 %    Mono % 7.7 %    Eos % 3.5 %    Basophil % 1.2 %    Lymph # 1.95 0.6 - 4.6 x10(3)/mcL    Neut # 5.63 2.1 - 9.2 x10(3)/mcL    Mono # 0.67 0.1 - 1.3 x10(3)/mcL    Eos # 0.30 0 - 0.9 x10(3)/mcL    Baso # 0.10 <=0.2 x10(3)/mcL    IG# 0.03 0 - 0.04 x10(3)/mcL    IG% 0.3 %    NRBC% 0.0 %   MRSA PCR    Collection Time: 03/24/24  6:09 AM   Result Value Ref Range    MRSA PCR SCRN ([facility]) Not Detected Not Detected   Urinalysis    Collection Time: 03/24/24  6:11 AM   Result Value Ref Range    Color, UA Light-Yellow Yellow, Light-Yellow, Colorless, Straw, Dark-Yellow    Appearance, UA Clear Clear    Specific Gravity, UA 1.011 1.005 - 1.030    pH, UA 6.5 5.0 - 8.5    Protein, UA Negative Negative    Glucose, UA Normal Negative, Normal    Ketones, UA Negative Negative    Blood, UA Negative Negative    Bilirubin, UA Negative Negative    Urobilinogen, UA Normal 0.2, 1.0, Normal    Nitrites, UA Negative Negative    Leukocyte Esterase, UA Negative Negative    WBC, UA 0-5 None Seen, 0-2, 3-5, 0-5 /HPF    Bacteria, UA None Seen None Seen, Trace /HPF    Squamous Epithelial Cells, UA Trace None Seen /HPF    RBC, UA 0-5 None Seen, 0-2, 3-5, 0-5 /HPF   Type & Screen    Collection Time: 03/24/24  6:46 AM   Result Value Ref Range    Group & Rh A POS     Indirect Jarrett GEL NEG     Specimen Outdate 03/27/2024 23:59    Prepare RBC 4 Units; ON CALL TO OR    Collection Time: 03/24/24  6:46 AM   Result Value Ref Range    UNIT NUMBER [id number]     UNIT ABO/RH A POS     DISPENSE STATUS Issued     Unit Expiration [id number]     Product Code L4113G13     Unit Blood Type Code 6200     CROSSMATCH INTERPRETATION Compatible     UNIT NUMBER [id number]     UNIT ABO/RH A POS     DISPENSE STATUS Issued     Unit Expiration [id number]     Product Code G4408Y67     Unit Blood Type Code 6200     CROSSMATCH INTERPRETATION Compatible     UNIT NUMBER [id number]     UNIT ABO/RH A POS     DISPENSE STATUS Issued     Unit Expiration  [id number]     Product Code N9836R06     Unit Blood Type Code 6200     CROSSMATCH INTERPRETATION Compatible     UNIT NUMBER [id number]     UNIT ABO/RH A POS     DISPENSE STATUS Issued     Unit Expiration [id number]     Product Code Q5878H61     Unit Blood Type Code 6200     CROSSMATCH INTERPRETATION Compatible    ABORH RETYPE    Collection Time: 03/24/24  8:36 AM   Result Value Ref Range    ABORH Retype A POS    EKG 12-lead    Collection Time: 03/24/24  1:08 PM   Result Value Ref Range    QRS Duration 92 ms    [facility] QTC Calculation 438 ms   APTT    Collection Time: 03/25/24  3:07 AM   Result Value Ref Range    PTT 66.5 (H) 23.2 - 33.7 seconds   CBC with Differential    Collection Time: 03/25/24  3:07 AM   Result Value Ref Range    WBC 9.27 4.50 - 11.50 x10(3)/mcL    RBC 4.05 (L) 4.70 - 6.10 x10(6)/mcL    Hgb 12.7 (L) 14.0 - 18.0 g/dL    Hct 37.7 (L) 42.0 - 52.0 %    MCV 93.1 80.0 - 94.0 fL    MCH 31.4 (H) 27.0 - 31.0 pg    MCHC 33.7 33.0 - 36.0 g/dL    RDW 13.1 11.5 - 17.0 %    Platelet 254 130 - 400 x10(3)/mcL    MPV 10.2 7.4 - 10.4 fL    Neut % 62.2 %    Lymph % 24.4 %    Mono % 9.4 %    Eos % 2.7 %    Basophil % 1.0 %    Lymph # 2.26 0.6 - 4.6 x10(3)/mcL    Neut # 5.77 2.1 - 9.2 x10(3)/mcL    Mono # 0.87 0.1 - 1.3 x10(3)/mcL    Eos # 0.25 0 - 0.9 x10(3)/mcL    Baso # 0.09 <=0.2 x10(3)/mcL    IG# 0.03 0 - 0.04 x10(3)/mcL    IG% 0.3 %    NRBC% 0.0 %   POCT glucose    Collection Time: 03/25/24  4:15 AM   Result Value Ref Range    POCT Glucose 101 70 - 110 mg/dL   RT Blood Gas    Collection Time: 03/25/24  6:39 AM   Result Value Ref Range    Sample Type Arterial Blood     Sample site Arterial Line     Drawn by VR/holding     pH, Blood gas 7.420 7.350 - 7.450    pCO2, Blood gas 46.0 (H) 35.0 - 45.0 mmHg    pO2, Blood gas 116.0 (H) 80.0 - 100.0 mmHg    Sodium, Blood Gas 133 (L) 137 - 145 mmol/L    Potassium, Blood Gas 4.0 3.5 - 5.0 mmol/L    Calcium Level Ionized 1.20 1.12 - 1.23 mmol/L    TOC2, Blood gas 31.2  mmol/L    Base Excess, Blood gas 4.60 (H) -2.00 - 2.00 mmol/L    sO2, Blood gas 98.6 %    HCO3, Blood gas 29.8 (H) 22.0 - 26.0 mmol/L    THb, Blood gas 11.9 (L) 12 - 16 g/dL    O2 Hb, Blood Gas 97.2 (H) 94.0 - 97.0 %    CO Hgb 1.6 (H) 0.5 - 1.5 %    Met Hgb 0.9 0.4 - 1.5 %    Allens Test N/A     Oxygen Device, Blood gas Cannula     LPM 2      Telemetry:  Sinus Rhythm     Physical Exam  Vitals and nursing note reviewed.   Constitutional:       Appearance: Normal appearance.   HENT:      Head: Normocephalic.      Mouth/Throat:      Mouth: Mucous membranes are moist.      Pharynx: Oropharynx is clear.   Neck:      Comments: Right IJ Venous Cath  Cardiovascular:      Rate and Rhythm: Normal rate and regular rhythm.      Pulses: Normal pulses.      Heart sounds: Normal heart sounds.   Pulmonary:      Effort: Pulmonary effort is normal. No respiratory distress.      Breath sounds: Normal breath sounds.   Abdominal:      General: There is no distension.      Palpations: Abdomen is soft.      Tenderness: There is no abdominal tenderness. There is no guarding.   Musculoskeletal:         General: Normal range of motion.      Right lower leg: No edema.      Left lower leg: No edema.      Comments: Chest Tubes in Place    Skin:     General: Skin is warm and dry.      Comments: Mid Sternal Incision with Dressing in Place.    Neurological:      Comments: Chemical Sedation Post CABG on Vent       Current Inpatient Medications:    Current Facility-Administered Medications:     amLODIPine tablet 10 mg, 10 mg, Oral, Daily, Chetna Rivera T, FNP, 10 mg at 03/24/24 0900    aspirin EC tablet 81 mg, 81 mg, Oral, Daily, Chetna Rivera, FNP, 81 mg at 03/24/24 0900    atorvastatin tablet 40 mg, 40 mg, Oral, Daily, Brock Young, LEXIIP, 40 mg at 03/24/24 0900    cefUROXime sodium 1.5 g in dextrose 5 % in water (D5W) 100 mL IVPB (MB+), 1.5 g, Intravenous, On Call Procedure, Reji Sullivan MD    diazePAM tablet 10 mg, 10 mg, Oral, On Call  Procedure, Monse Lima MD, 10 mg at 03/21/24 1021    diphenhydrAMINE capsule 50 mg, 50 mg, Oral, On Call Procedure, Monse Lima MD, 50 mg at 03/21/24 1021    heparin (porcine) injection, , , PRN, Reji Sullivan MD, 5,000 Units at 03/25/24 0637    heparin 25,000 units in dextrose 5% (100 units/ml) IV bolus from bag LOW INTENSITY nomogram - [facility], 60 Units/kg (Adjusted), Intravenous, PRN, Reji Sullivan MD, 2,890 Units at 03/21/24 2056    heparin 25,000 units in dextrose 5% (100 units/ml) IV bolus from bag LOW INTENSITY nomogram - [facility], 30 Units/kg (Adjusted), Intravenous, PRN, Reji Sullivan MD, 1,450 Units at 03/23/24 0846    heparin 25,000 units in dextrose 5% 250 mL (100 units/mL) infusion LOW INTENSITY nomogram - [facility], 0-40 Units/kg/hr (Adjusted), Intravenous, Continuous, Reji Sullivan MD, Last Rate: 8.2 mL/hr at 03/24/24 2010, 17 Units/kg/hr at 03/24/24 2010    heparin, porcine (PF) 100 unit/mL injection flush 500 Units, 5 mL, Intravenous, On Call Procedure, Reji Sullivan MD    hydrALAZINE injection 10 mg, 10 mg, Intravenous, Q4H PRN, Chetna Rivera FNP, 10 mg at 03/22/24 1903    LIDOcaine HCL 20 mg/ml (2%) injection 2 mL, 2 mL, Intradermal, Once, Reji Sullivan MD    morphine injection 2 mg, 2 mg, Intravenous, Q4H PRN, Karla Joseph FNP, 2 mg at 03/24/24 1847    mupirocin 2 % ointment, , Nasal, On Call Procedure, Reji Sullivan MD    nitroGLYCERIN in 5 % dextrose 50 mg/250 mL (200 mcg/mL) infusion, 0-400 mcg/min, Intravenous, Continuous, Ba Alves, NP, Stopped at 03/25/24 0932    nitroGLYCERIN SL tablet 0.4 mg, 0.4 mg, Sublingual, Q5 Min PRN, Chetna Rivera, BOUBACAR, 0.4 mg at 03/22/24 1900    ondansetron injection 4 mg, 4 mg, Intravenous, Q12H PRN, Brock Young, BOUBACAR    pantoprazole EC tablet 40 mg, 40 mg, Oral, Daily, Chetna Rivera FNP, 40 mg at 03/24/24 0900    papaverine injection, , , PRN, Reji Sullivan MD, 60 mg at 03/25/24 0637    sodium chloride 0.9%  flush 10 mL, 10 mL, Intravenous, PRN, Monse Lima MD    Facility-Administered Medications Ordered in Other Encounters:     cefUROXime sodium 1.5 mg in dextrose 5 % (D5W) 50 mL IVPB, , Intravenous, Continuous PRN, Dabadie, Virginia G, CRNA, 1.5 g at 03/25/24 0812    clevidipine (CLEVIPREX) 25 mg/50 mL infusion, , Intravenous, Continuous PRN, Dabadie, Virginia G, CRNA, Stopped at 03/25/24 0849    dexmedeTOMIDine (PRECEDEX) 400 mcg in sodium chloride 0.9% 100 mL infusion, , Intravenous, Continuous PRN, Dabadie, Virginia G, CRNA, Last Rate: 7.095 mL/hr at 03/25/24 0948, 0.6 mcg/kg/hr at 03/25/24 0948    etomidate injection, , Intravenous, PRN, Dabadie, Virginia G, CRNA, 20 mg at 03/25/24 0649    fentaNYL injection, , Intravenous, PRN, Dabadie, Virginia G, CRNA, 250 mcg at 03/25/24 0823    heparin (porcine) injection, , Intravenous, PRN, Dabadie, Virginia G, CRNA, 20,000 Units at 03/25/24 0842    insulin regular 100 Units in sodium chloride 0.9% 100 mL infusion, , Intravenous, Continuous PRN, Dabadie, Virginia G, CRNA, Last Rate: 3 mL/hr at 03/25/24 0945, 3 Units/hr at 03/25/24 0945    LIDOcaine (PF) 20 mg/mL (2%) injection, , Intravenous, PRN, Dabadie, Virginia G, CRNA, 80 mg at 03/25/24 0649    metoprolol injection, , Intravenous, PRN, Dabadie, Virginia G, CRNA, 2.5 mg at 03/25/24 0826    midazolam injection, , Intravenous, PRN, Dabadie, Virginia G, CRNA, 1.5 mg at 03/25/24 0931    PHENYLephrine injection, , Intravenous, PRN, Dabadie, Virginia G, CRNA, 100 mcg at 03/25/24 0850    propofol (DIPRIVAN) 10 mg/mL infusion, , Intravenous, PRN, Dabadie, Virginia G, CRNA, 50 mg at 03/25/24 0657    rocuronium injection, , Intravenous, PRN, Dabadie, Virginia G, CRNA, 25 mg at 03/25/24 0931    sodium chloride 0.9% infusion, , Intravenous, Continuous PRN, Dabadie, Virginia G, CRNA, New Bag at 03/25/24 0645    sodium chloride 0.9% infusion, , Intravenous, Continuous PRN, Dabadie, Virginia G, CRNA, New Bag at 03/25/24 0706     tranexamic acid injection Soln, , Intravenous, PRN, Dabadie, Virginia G, CRNA, 500 mg at 03/25/24 0748  VTE Risk Mitigation (From admission, onward)           Ordered     heparin (porcine) injection  As needed (PRN)         03/25/24 0638     heparin, porcine (PF) 100 unit/mL injection flush 500 Units  On Call Procedure         03/24/24 2215     heparin 25,000 units in dextrose 5% (100 units/ml) IV bolus from bag LOW INTENSITY nomogram - [facility]  As needed (PRN)        Question:  Heparin Infusion Adjustment (DO NOT MODIFY ANSWER)  Answer:  \\[email or web address]\epic\Images\Pharmacy\HeparinInfusions\heparin LOW INTENSITY nomogram for [facility] [id number]    03/21/24 1348     heparin 25,000 units in dextrose 5% (100 units/ml) IV bolus from bag LOW INTENSITY nomogram - [facility]  As needed (PRN)        Question:  Heparin Infusion Adjustment (DO NOT MODIFY ANSWER)  Answer:  \\[email or web address]\epic\Images\Pharmacy\HeparinInfusions\heparin LOW INTENSITY nomogram for [facility] [id number].pdf    03/21/24 1348     heparin 25,000 units in dextrose 5% 250 mL (100 units/mL) infusion LOW INTENSITY nomogram - [facility]  Continuous        Question:  Begin at (units/kg/hr)  Answer:  12    03/21/24 1402                  Assessment/Plan:   CAD (Multivessel)- Status Post CABG (3.25.24) LIMA to LAD, SVG to OM, SVG to Distal RCA (HANK Ligation)    - LM: 90% Distal, LAD: 70% Ostial, LCX: 40-50% Proximal, RCA: (Dominant) 80-90% Serially Calcified Lesions (EF 50%) (3.21.24)  Hypertension    - On Cleviprex Infusion  PAD    - REIA 70-80% Stenosis at Pelvic Brim (3.21.24)  Nicotine Dependence/Chronic Tobacco Use  ROBBI    - 50-69% MUSTAPHA (CUS 3.21.24)  Anemia    Impression:  Continue Aspirin 81 Mg Daily & Continue Statin   Cleviprex for Blood Pressure Control  Continue Routine Post Op Care as per ICU Team  Will follow closely    BOUBACAR Leigh  Cardiology  [facility] - 6th Floor Medical Telemetry  03/25/2024     I agree with the findings of the complexity of problems  addressed and take responsibility for the plan's risks and complications. I approved the plan documented by Chetna Rivera NP.  Routine post-op care
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
## 2024-03-25 NOTE — ANESTHESIA PROCEDURE NOTES
Intubation    Date/Time: 3/25/2024 6:51 AM    Performed by: Dabadie, Virginia G, CRNA  Authorized by: Sharad Yadav MD    Intubation:     Induction:  Intravenous    Intubated:  Postinduction    Mask Ventilation:  Easy mask    Attempts:  1    Attempted By:  CRNA    Method of Intubation:  Direct    Blade:  Baltazar 2    Laryngeal View Grade: Grade I - full view of cords      Difficult Airway Encountered?: No      Complications:  None    Airway Device:  Oral endotracheal tube    Airway Device Size:  8.0    Style/Cuff Inflation:  Cuffed (inflated to minimal occlusive pressure)    Tube secured:  21    Secured at:  The lips    Placement Verified By:  Capnometry    Complicating Factors:  None    Findings Post-Intubation:  BS equal bilateral and atraumatic/condition of teeth unchanged  Notes:      Cuff Pressure 25cm H20

## 2024-03-25 NOTE — OP NOTE
Preop diagnosis:  Severe coronary artery disease      Procedure:  Coronary artery bypass grafting X 3 ,   Left internal mammary artery to the LAD   Saphenous venous graft to   OM  Saphenous venous graft to    distal RCA  Ligation of left atrial appendage with a 35 mm atrial clip  Endoscopic venous harvesting left greater saphenous vein          Postop diagnosis:  The same as preop    Date:  03/25/2024    Anesthesia:  General    Blood loss: Per perfusion     Surgeon: Reji Sullivan MD    Assistant: DON Crain        Under informed consent the  patient was taken the OR, put in a supine position and  general anesthesia was induced and therefore maintained for remainder of the procedure.All the pressure points were padded equally. The skin over the chest abdomen and legs was prepped and draped in the usual sterile fashion. IV antibiotics were administered. Anesthesia has placed the appropriate lines.  Preop MANI was performed revealing no evidence of valvular disease or left atrial clot.    Endoscopic venous harvesting was performed left lower extremity with good quality vein.  A Midline incision was made followed by taking down the subcutaneous tissue and fascia exposing the sternum that was penetrated in the midline. The mammary was harvested on a pedicle, the patient was heparinized. A midline retractor was placed, the pericardium was opened and pericardial stay sutures were placed. Ascending aortic cannulation was performed, the mammary was cut, the distal pedicle was ligated and clipped and proximal pedicle was controlled with a bulldog.The mammary had good flow in it.  Dual stage venous cannula was placed in the right atrium and when the ACT was therapeutic the patient went on full cardiopulmonary bypass with good emptying.  Cross-clamp was placed followed by antegrade dose of cardioplegia and 1 L of cold blood repeated throughout the case at 20-30 minutes intervals.  We had good either isoelectricity  throughout the whole case.  The patient's temperature was allowed to drift to 34° C.    Examination over the lateral aspect of the heart revealed the 1st OM vessel.  This was opened and has a diameter of   2.25    mm.  This was anastomosed to a reverse segment of saphenous vein with good flow down the graft.  The vein was cut to the appropriate length.  Next the right coronary artery was examined this was opened at the level of  distal RCA prior to the bifurcation        , this was a  2     mm vessel.  This vessel has calcification I opened it up at the lead area. This was anastomosed to reverse segment of saphenous vein with good flow down the graft, the vein was cut to the appropriate lengths.  The clip was applied to the left atrial appendage. A slit was made in the pericardium, the LAD was opened in the mid epicardium.  This was  to     millimiters in diameter.  This was anastomosed to the mammary in running Prolene fashion, the mammary pedicle was tacked to the epicardium.  When the mammary bulldog was released there was brisk blood flow down the LAD indicating a very patent anastomosis.  The patient was being rewarmed, the cross-clamp was removed and a  partial occlusion clamp was placed.  Two arteriotomies were performed and a 4 mm punch was used, the proximal anastomoses were made with running Prolene.  The partial occluding clamp was released.  The left chest was evacuated of blood.  The proximal and distal anastomosis were checked for hemostasis.  When the patient was warm, he came off pump with no problem.  Heparin was reversed with protamine.  The mediastinum and left chest were drained. The patient was decannulated.  The sternum was wired and the wounds were closed in layers absorbable sutures. The patient tolerated the procedure well.  Postop MANI revealed good ventricular contractility      he was transferred to the ICU in acceptable condition.

## 2024-03-25 NOTE — ANESTHESIA PROCEDURE NOTES
Central Line    Diagnosis: CABG  Patient location during procedure: done in OR  Procedure start time: 3/25/2024 7:15 AM  Timeout: 3/25/2024 7:15 AM  Procedure end time: 3/25/2024 7:20 AM    Staffing  Authorizing Provider: Sharad Yadav MD  Performing Provider: Dabadie, Virginia G, CRNA    Staffing  Performed: anesthesiologist   Anesthesiologist: Sharad Yadav MD  Resident/CRNA: Zeke Coffey  Performed by: Dabadie, Virginia G, CRNA  Authorized by: Sharad Yadav MD    Anesthesiologist was present at the time of the procedure.  Preanesthetic Checklist  Completed: patient identified, IV checked, site marked, risks and benefits discussed, surgical consent, monitors and equipment checked, pre-op evaluation, timeout performed and anesthesia consent given  Indication   Indication: hemodynamic monitoring, vascular access     Anesthesia   general anesthesia    Central Line   Skin Prep: skin prepped with ChloraPrep, skin prep agent completely dried prior to procedure  Sterile Barriers Followed: Yes    All five maximal barriers used- gloves, gown, cap, mask, and large sterile sheet    hand hygiene performed prior to central venous catheter insertion  Location: right internal jugular.   Catheter type: double lumen  Catheter Size: 9 Fr  Ultrasound: vascular probe with ultrasound   Vessel Caliber: medium, patent  Vascular Doppler:  not done, compressibility normal  Needle advanced into vessel with real time Ultrasound guidance.  Guidewire confirmed in vessel.  Image recorded and saved.  sterile gel and probe cover used in ultrasound-guided central venous catheter insertion  Manometry: none  Insertion Attempts: 1   Securement:line sutured, sterile dressing applied, blood return through all ports and chlorhexidine patch    Post-Procedure    Adverse Events:none      Guidewire Guidewire removed intact.
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
## 2024-03-25 NOTE — TRANSFER OF CARE
"Anesthesia Transfer of Care Note    Patient: Ata Pickens    Procedure(s) Performed: Procedure(s) (LRB):  CORONARY ARTERY BYPASS GRAFT (CABG) (N/A)  SURGICAL PROCUREMENT, VEIN, ENDOSCOPIC (Left)  EXCLUSION, LEFT ATRIAL APPENDAGE (N/A)    Patient location: ICU    Anesthesia Type: general    Transport from OR: Transported from OR intubated on 100% O2 by AMBU with adequate controlled ventilation. Continuous ECG monitoring in transport. Continuous SpO2 monitoring in transport. Continuos invasive BP monitoring in transport. Upon arrival to PACU/ICU, patient attached to ventilator and auscultated to confirm bilateral breath sounds and adequate TV    Post pain: adequate analgesia    Post assessment: no apparent anesthetic complications    Post vital signs: stable    Level of consciousness: sedated    Nausea/Vomiting: no nausea/vomiting    Complications: none    Transfer of care protocol was followed      Last vitals: Visit Vitals  /67   Pulse (!) 55   Temp 36 °C (96.8 °F)   Resp 20   Ht 5' 4" (1.626 m)   Wt 47.3 kg (104 lb 4.4 oz)   SpO2 100%   BMI 17.90 kg/m²     "

## 2024-03-25 NOTE — CONSULTS
[facility]                       [address] [id number]    PATIENT NAME:       DIPIKA ARAGON   YOB: 1955  CSN:                [id number]   MRN:                [id number]  ADMIT DATE:         03/21/2024 08:39:00  PHYSICIAN:          Robert Blas MD                            CONSULTATION    DATE OF CONSULT:  03/25/2024 00:00:00    REASON FOR CONSULTATION:  Inability to place catheter.    CLINICAL HISTORY:  This is a 68-year-old male, who is intubated and scheduled   for coronary artery bypass grafting.  They were having trouble getting a   catheter in.  I was consulted to see the patient.    PAST SURGICAL HISTORY:  Refer to admission H and P, no changes.    SOCIAL HISTORY:  Refer to admission H and P, no changes.    FAMILY HISTORY:  Refer to admission H and P, no changes.    REVIEW OF SYSTEMS:  Refer to admission H and P, no changes.    PHYSICAL EXAMINATION:  GENERAL:  Patient is currently intubated.    ABDOMEN:  Nondistended.    :  Phallus is circumcised, normal.  No blood at the meatus and testes are   bilaterally descended and atrophic.    ASSESSMENT:  Likely urethral stricture disease.    PLAN:  Flexible cystoscopy and placement of Celeste catheter.        ______________________________  Robert Blas MD    JJT/AQS  DD:  03/25/2024  Time:  08:12AM  DT:  03/25/2024  Time:  08:58AM  Job #:  [id number]      CONSULTATION

## 2024-03-26 LAB
ABO + RH BLD: NORMAL
ALBUMIN SERPL-MCNC: 3.1 G/DL (ref 3.4–4.8)
ALBUMIN/GLOB SERPL: 1.6 RATIO (ref 1.1–2)
ALP SERPL-CCNC: 48 UNIT/L (ref 40–150)
ALT SERPL-CCNC: 21 UNIT/L (ref 0–55)
APTT PPP: 33 SECONDS (ref 23.2–33.7)
AST SERPL-CCNC: 83 UNIT/L (ref 5–34)
BILIRUB SERPL-MCNC: 1.9 MG/DL
BLD PROD TYP BPU: NORMAL
BLOOD UNIT EXPIRATION DATE: NORMAL
BLOOD UNIT TYPE CODE: 6200
BUN SERPL-MCNC: 14.1 MG/DL (ref 8.4–25.7)
CALCIUM SERPL-MCNC: 8.3 MG/DL (ref 8.8–10)
CHLORIDE SERPL-SCNC: 109 MMOL/L (ref 98–107)
CO2 SERPL-SCNC: 19 MMOL/L (ref 23–31)
CREAT SERPL-MCNC: 0.74 MG/DL (ref 0.73–1.18)
CROSSMATCH INTERPRETATION: NORMAL
DISPENSE STATUS: NORMAL
ERYTHROCYTE [DISTWIDTH] IN BLOOD BY AUTOMATED COUNT: 14.6 % (ref 11.5–17)
GFR SERPLBLD CREATININE-BSD FMLA CKD-EPI: >60 MLS/MIN/1.73/M2
GLOBULIN SER-MCNC: 1.9 GM/DL (ref 2.4–3.5)
GLUCOSE SERPL-MCNC: 140 MG/DL (ref 82–115)
HCT VFR BLD AUTO: 33.5 % (ref 42–52)
HGB BLD-MCNC: 11.4 G/DL (ref 14–18)
MAGNESIUM SERPL-MCNC: 1.8 MG/DL (ref 1.6–2.6)
MCH RBC QN AUTO: 31.2 PG (ref 27–31)
MCHC RBC AUTO-ENTMCNC: 34 G/DL (ref 33–36)
MCV RBC AUTO: 91.8 FL (ref 80–94)
NRBC BLD AUTO-RTO: 0 %
PHOSPHATE SERPL-MCNC: 3 MG/DL (ref 2.3–4.7)
PLATELET # BLD AUTO: 142 X10(3)/MCL (ref 130–400)
PLATELETS.RETICULATED NFR BLD AUTO: 6.7 % (ref 0.9–11.2)
PMV BLD AUTO: 10.9 FL (ref 7.4–10.4)
POCT GLUCOSE: 124 MG/DL (ref 70–110)
POCT GLUCOSE: 124 MG/DL (ref 70–110)
POCT GLUCOSE: 135 MG/DL (ref 70–110)
POCT GLUCOSE: 139 MG/DL (ref 70–110)
POTASSIUM SERPL-SCNC: 4.3 MMOL/L (ref 3.5–5.1)
PROT SERPL-MCNC: 5 GM/DL (ref 5.8–7.6)
RBC # BLD AUTO: 3.65 X10(6)/MCL (ref 4.7–6.1)
SODIUM SERPL-SCNC: 135 MMOL/L (ref 136–145)
UNIT NUMBER: NORMAL
WBC # SPEC AUTO: 10.01 X10(3)/MCL (ref 4.5–11.5)

## 2024-03-26 PROCEDURE — 25000003 PHARM REV CODE 250: Performed by: NURSE PRACTITIONER

## 2024-03-26 PROCEDURE — 63600175 PHARM REV CODE 636 W HCPCS: Performed by: PHYSICIAN ASSISTANT

## 2024-03-26 PROCEDURE — 83735 ASSAY OF MAGNESIUM: CPT | Performed by: PHYSICIAN ASSISTANT

## 2024-03-26 PROCEDURE — 84100 ASSAY OF PHOSPHORUS: CPT | Performed by: PHYSICIAN ASSISTANT

## 2024-03-26 PROCEDURE — 85730 THROMBOPLASTIN TIME PARTIAL: CPT | Performed by: THORACIC SURGERY (CARDIOTHORACIC VASCULAR SURGERY)

## 2024-03-26 PROCEDURE — 99024 POSTOP FOLLOW-UP VISIT: CPT | Mod: ,,,

## 2024-03-26 PROCEDURE — 25000003 PHARM REV CODE 250: Performed by: PHYSICIAN ASSISTANT

## 2024-03-26 PROCEDURE — 27000221 HC OXYGEN, UP TO 24 HOURS

## 2024-03-26 PROCEDURE — 99900035 HC TECH TIME PER 15 MIN (STAT)

## 2024-03-26 PROCEDURE — S5010 5% DEXTROSE AND 0.45% SALINE: HCPCS | Performed by: PHYSICIAN ASSISTANT

## 2024-03-26 PROCEDURE — 94760 N-INVAS EAR/PLS OXIMETRY 1: CPT

## 2024-03-26 PROCEDURE — 21400001 HC TELEMETRY ROOM

## 2024-03-26 PROCEDURE — 63600175 PHARM REV CODE 636 W HCPCS: Performed by: THORACIC SURGERY (CARDIOTHORACIC VASCULAR SURGERY)

## 2024-03-26 PROCEDURE — 85027 COMPLETE CBC AUTOMATED: CPT | Performed by: PHYSICIAN ASSISTANT

## 2024-03-26 PROCEDURE — 80053 COMPREHEN METABOLIC PANEL: CPT | Performed by: PHYSICIAN ASSISTANT

## 2024-03-26 PROCEDURE — 5A0945A ASSISTANCE WITH RESPIRATORY VENTILATION, 24-96 CONSECUTIVE HOURS, HIGH NASAL FLOW/VELOCITY: ICD-10-PCS | Performed by: THORACIC SURGERY (CARDIOTHORACIC VASCULAR SURGERY)

## 2024-03-26 RX ORDER — BISACODYL 10 MG/1
10 SUPPOSITORY RECTAL DAILY PRN
OUTPATIENT
Start: 2024-03-26

## 2024-03-26 RX ORDER — POLYETHYLENE GLYCOL 3350 17 G/17G
17 POWDER, FOR SOLUTION ORAL DAILY PRN
OUTPATIENT
Start: 2024-03-26

## 2024-03-26 RX ADMIN — MAGNESIUM SULFATE HEPTAHYDRATE 2 G: 40 INJECTION, SOLUTION INTRAVENOUS at 04:03

## 2024-03-26 RX ADMIN — DEXTROSE AND SODIUM CHLORIDE: 5; 450 INJECTION, SOLUTION INTRAVENOUS at 10:03

## 2024-03-26 RX ADMIN — OXYCODONE HYDROCHLORIDE 10 MG: 5 TABLET ORAL at 01:03

## 2024-03-26 RX ADMIN — DOCUSATE SODIUM 100 MG: 50 CAPSULE, LIQUID FILLED ORAL at 08:03

## 2024-03-26 RX ADMIN — OXYCODONE HYDROCHLORIDE 10 MG: 5 TABLET ORAL at 08:03

## 2024-03-26 RX ADMIN — CEFAZOLIN SODIUM 2 G: 2 SOLUTION INTRAVENOUS at 08:03

## 2024-03-26 RX ADMIN — ENOXAPARIN SODIUM 40 MG: 40 INJECTION SUBCUTANEOUS at 05:03

## 2024-03-26 RX ADMIN — DEXTROSE AND SODIUM CHLORIDE: 5; 450 INJECTION, SOLUTION INTRAVENOUS at 01:03

## 2024-03-26 RX ADMIN — ASPIRIN 81 MG: 81 TABLET, COATED ORAL at 08:03

## 2024-03-26 RX ADMIN — ATORVASTATIN CALCIUM 40 MG: 40 TABLET, FILM COATED ORAL at 08:03

## 2024-03-26 RX ADMIN — KETOROLAC TROMETHAMINE 15 MG: 30 INJECTION, SOLUTION INTRAMUSCULAR at 05:03

## 2024-03-26 RX ADMIN — MUPIROCIN: 20 OINTMENT TOPICAL at 09:03

## 2024-03-26 RX ADMIN — OXYCODONE HYDROCHLORIDE 10 MG: 5 TABLET ORAL at 05:03

## 2024-03-26 RX ADMIN — FAMOTIDINE 20 MG: 10 INJECTION, SOLUTION INTRAVENOUS at 08:03

## 2024-03-26 RX ADMIN — PANTOPRAZOLE SODIUM 40 MG: 40 TABLET, DELAYED RELEASE ORAL at 08:03

## 2024-03-26 RX ADMIN — FOLIC ACID 1 MG: 1 TABLET ORAL at 08:03

## 2024-03-26 NOTE — PROGRESS NOTES
Pulmonary & Critical Care Medicine   Progress Note      Presenting History/HPI:    Patient is a 68-year-old male with a history of coronary artery disease, hypertension, peripheral vascular disease and nicotine dependence who underwent outpatient cardiac workup finding three-vessel coronary artery disease.  Ejection fraction normal at 50% and therefore underwent three-vessel coronary by past graft surgery today with the LIMA to LAD and saphenous vein to obtuse marginal and RCA.         Interval History:  -no major issues or changes overnight   -patient was postoperative day 1 status post three-vessel CABG and ligation of left atrial appendage   -patient seen sitting upright in bedside chair without any complaints this morning except for some shortness a breath      Scheduled Medications:    aspirin  81 mg Oral Daily    atorvastatin  40 mg Oral Daily    docusate sodium  100 mg Oral BID    enoxparin  40 mg Subcutaneous Daily    folic acid  1 mg Oral Daily    LIDOcaine HCL 20 mg/ml (2%)  2 mL Intradermal Once    mupirocin   Nasal BID    pantoprazole  40 mg Oral Daily    sucralfate  1 g Oral QID (AC & HS)       PRN Medications:   acetaminophen, albumin human 5%, calcium gluconate IVPB, calcium gluconate IVPB, calcium gluconate IVPB, diazePAM, diphenhydrAMINE, heparin, porcine (PF), hydrALAZINE, HYDROcodone-acetaminophen, lactulose 10 gram/15 ml, loperamide, magnesium sulfate IVPB, magnesium sulfate IVPB, metoclopramide, morphine, morphine, mupirocin, nitroGLYCERIN, ondansetron, ondansetron, oxyCODONE, potassium chloride in water, potassium chloride in water, potassium chloride in water, sodium chloride 0.9%, sodium phosphate 15 mmol in dextrose 5 % (D5W) 250 mL IVPB      Infusions:     clevidipine 0 mg/hr (03/25/24 1500)    dexmedeTOMIDine (Precedex) infusion (titrating) 0 mcg/kg/hr (03/25/24 1530)    dextrose 5 % and 0.45 % NaCl 100 mL/hr at 03/26/24 1325    electrolyte-A 50 mL/hr at 03/25/24 1637    EPINEPHrine  0.02 mcg/kg/min (03/25/24 1637)    insulin regular 1 units/mL infusion orderable (CTS POST-OP) 3 Units/hr (03/25/24 1135)    loperamide      nitroGLYCERIN Stopped (03/25/24 0932)         Fluid Balance:     Intake/Output Summary (Last 24 hours) at 3/26/2024 1623  Last data filed at 3/26/2024 1400  Gross per 24 hour   Intake 2580.65 ml   Output 1748 ml   Net 832.65 ml         Vital Signs:   Vitals:    03/26/24 1500   BP: 116/68   Pulse: 76   Resp: (!) 30   Temp:          Physical Exam  Vitals and nursing note reviewed.   Constitutional:       General: He is not in acute distress.     Appearance: Normal appearance. He is not ill-appearing or toxic-appearing.   HENT:      Head: Normocephalic and atraumatic.      Right Ear: External ear normal.      Left Ear: External ear normal.      Nose: Nose normal.      Mouth/Throat:      Mouth: Mucous membranes are moist.      Pharynx: Oropharynx is clear. No oropharyngeal exudate or posterior oropharyngeal erythema.   Eyes:      General: No scleral icterus.     Extraocular Movements: Extraocular movements intact.      Conjunctiva/sclera: Conjunctivae normal.      Pupils: Pupils are equal, round, and reactive to light.   Neck:      Vascular: No carotid bruit.   Cardiovascular:      Rate and Rhythm: Normal rate and regular rhythm.      Pulses: Normal pulses.      Heart sounds: Normal heart sounds. No murmur heard.     No friction rub. No gallop.      Comments: Median sternotomy wound site clean and dry, mediastinal chest tubes in place with serosanguineous output  Pulmonary:      Effort: Pulmonary effort is normal. No respiratory distress.      Breath sounds: Normal breath sounds. No wheezing, rhonchi or rales.   Abdominal:      General: Abdomen is flat. Bowel sounds are normal. There is no distension.      Palpations: Abdomen is soft.      Tenderness: There is no abdominal tenderness. There is no guarding or rebound.   Genitourinary:     Comments: deferred  Musculoskeletal:          General: No swelling or deformity. Normal range of motion.      Cervical back: Normal range of motion and neck supple. No rigidity or tenderness.   Lymphadenopathy:      Cervical: No cervical adenopathy.   Skin:     General: Skin is warm and dry.      Capillary Refill: Capillary refill takes less than 2 seconds.      Coloration: Skin is not jaundiced.      Findings: No bruising, lesion or rash.   Neurological:      General: No focal deficit present.      Mental Status: He is alert.      Sensory: No sensory deficit.      Motor: No weakness.   Psychiatric:         Mood and Affect: Mood normal.           Ventilator Settings  Vent Mode: CPAP PSV (03/25/24 1635)  Set Rate: 20 BPM (03/25/24 1411)  Vt Set: 500 mL (03/25/24 1411)  Pressure Support: 10 cmH20 (03/25/24 1635)  PEEP/CPAP: 5 cmH20 (03/25/24 1635)  Oxygen Concentration (%): 40 (03/25/24 1635)  Peak Airway Pressure: 20 cmH20 (03/25/24 1411)  Total Ve: 8.2 L/m (03/25/24 1635)  F/VT Ratio<105 (RSBI): (!) 60.42 (03/25/24 1411)      Laboratory Studies:   Recent Labs   Lab 03/25/24  1630   PH 7.370   PCO2 41.0   PO2 91.0   HCO3 23.7     Recent Labs   Lab 03/26/24  0308   WBC 10.01   RBC 3.65*   HGB 11.4*   HCT 33.5*      MCV 91.8   MCH 31.2*   MCHC 34.0     Recent Labs   Lab 03/26/24  0308   GLUCOSE 140*   *   K 4.3   CO2 19*   BUN 14.1   CREATININE 0.74   CALCIUM 8.3*   MG 1.80         Microbiology Data:   Microbiology Results (last 7 days)       Procedure Component Value Units Date/Time    MRSA Screen by PCR [[id number]]     Order Status: Sent Specimen: Nasopharyngeal Swab from Nasal               Imaging:   X-Ray Chest AP Portable  Narrative: EXAMINATION:  XR CHEST AP PORTABLE    CLINICAL HISTORY:  Post-op;    TECHNIQUE:  Single frontal view of the chest was performed.    COMPARISON:  March 25, 2024    FINDINGS:  Examination reveals cardiomediastinal silhouette and pleuroparenchymal changes to be essentially unchanged as compared with the previous  exam.    Interval removal of endotracheal tube and nasogastric tube.    Small left apical pneumothorax identified  Impression: Interval removal of endotracheal tube and nasogastric tube.    Small left apical pneumothorax.    No other interval change other support catheters remain in place    Electronically signed by: Jakob Torres  Date:    03/26/2024  Time:    09:04          Assessment and Plan    Assessment:  Postop day 1 status post three-vessel CABG with left atrial appendage ligation  Hypertension   Tobacco abuse   Peripheral vascular disease          Plan:  -discontinue lines tubes medications per CV surgery protocol   -resume home medication for hypertension   -transfer out of ICU level care          Brock Dhaliwal MD  3/26/2024  Pulmonology/Critical Care

## 2024-03-26 NOTE — PROGRESS NOTES
PROGRESS NOTE    Admit Date: 3/21/2024  Hospital Day: 5  Procedure: 1 Day Post-Op CABG x3 3/25    Subjective:  AF, HDS  Pain controlled this AM   OOB to chair this AM  CT in place, approx 1.1L - air leak   4L NC   Voiding via still    Objective:  Inpatient Data      Vitals:   Temp:  [96.8 °F (36 °C)-98.9 °F (37.2 °C)]   Pulse:  [55-91]   Resp:  [10-45]   BP: ()/(58-79)   SpO2:  [90 %-100 %]   Arterial Line BP: ()/()     Diet Clear liquid (no sugar)/Bariatric   Intake/Output Summary (Last 24 hours) at 3/26/2024 0846  Last data filed at 3/26/2024 0600  Gross per 24 hour   Intake 5917.65 ml   Output 3758 ml   Net 2159.65 ml          Recent Labs     03/24/24  0434 03/25/24  0307 03/25/24  0901 03/25/24  0907 03/25/24  1221 03/25/24  1702 03/25/24  2234 03/26/24  0308   WBC 8.68   < > 10.26  --  13.87*  --   --  10.01   HGB 12.7*   < > 11.1*  --  13.6*  13.6* 10.6*  --  11.4*   HCT 37.7*   < > 33.5*   < > 40.7*  40.7* 31.5*  --  33.5*      < > 108*  --  145  --   --  142     --  138  --  139  --   --  135*   K 4.2  --  4.3  --  3.8 3.5 4.4 4.3   CO2 26  --  19*  --  19*  --   --  19*   BUN 16.2  --  16.0  --  14.6  --   --  14.1   CREATININE 0.87  --  0.67*  --  0.70*  --   --  0.74   BILITOT  --   --   --   --  1.9*  --   --  1.9*   AST  --   --   --   --  24  --   --  83*   ALT  --   --   --   --  11  --   --  21   ALKPHOS  --   --   --   --  61  --   --  48   CALCIUM 9.9  --  9.5  --  9.5  --   --  8.3*   ALBUMIN  --   --   --   --  3.4  --   --  3.1*   MG 1.90  --   --   --  2.20  --   --  1.80   PHOS  --   --   --   --  4.4  --   --  3.0    < > = values in this interval not displayed.     Scheduled Meds: aspirin, 81 mg, Daily  atorvastatin, 40 mg, Daily  docusate sodium, 100 mg, BID  enoxparin, 40 mg, Daily  famotidine (PF), 20 mg, Daily  folic acid, 1 mg, Daily  LIDOcaine HCL 20 mg/ml (2%), 2 mL, Once  mupirocin, , BID  pantoprazole, 40 mg, Daily  sucralfate, 1 g, QID (AC & HS)      clevidipine 0 mg/hr (03/25/24 1500)    dexmedeTOMIDine (Precedex) infusion (titrating) 0 mcg/kg/hr (03/25/24 1530)    dextrose 5 % and 0.45 % NaCl 100 mL/hr at 03/25/24 2356    electrolyte-A 50 mL/hr at 03/25/24 1637    EPINEPHrine 0.02 mcg/kg/min (03/25/24 1637)    insulin regular 1 units/mL infusion orderable (CTS POST-OP) 3 Units/hr (03/25/24 1135)    loperamide      nitroGLYCERIN Stopped (03/25/24 0932)     Physical Exam:  General: no acute distress, resting in bed  CV: RR, incision c/d/i  Pulm: normal wob on 4L, equal chest rise bilaterally. L/M/R chest tubes in place, no air leak, serosang  Abd: abdomen soft, nd, nttp  Ext: moves all spontaneously  Skin: warm and dry    Imaging:  CXR pending    Assessment/Plan:  69yo M s/p CABG x 3    - AF, HDS  - MM pain control  - IS, deep breathing  - Continue CT drainage, no air leak. Daily CXR  - Celeste in place, strict I/Os  - Replace electrolytes PRN  - OOB as tolerated, appreciate PT/OT  - Dispo: pending continued improvement    Сергей Duque  [employer] General Surgery, PGY4

## 2024-03-26 NOTE — PROGRESS NOTES
[facility] - 6th Floor [facility] Telemetry    Cardiology  Progress Note    Patient Name: Ata Pickens  MRN: [id number]  Admission Date: 3/21/2024  Hospital Length of Stay: 5 days  Code Status: Full Code   Attending Physician: Monse Lima MD   Primary Care Physician: Lorraine, Primary Doctor  Expected Discharge Date:   Principal Problem:<principal problem not specified>    Subjective:   Chief Complaint:  Outpatient Cath- MV CAD- CABG      HPI:   Mr. Pickens is a 68 year old male, known to Dr. Lima, who presented to the hospital and underwent elective coronary angiogram due to diagnosis of abnormal stress test which was performed in the outpatient setting due to reported history of chest pain, SOB, and fatigue with minimal exertion. He underwent cath on 3.21.24 revealing MV CAD including 90% distal LM/Ostial LAD Disease. Patient's EF on LV Gram noted to be 50%. Patient was admitted to [facility]. CT Surgical services consulted for CABG evaluation.    Hospital Course:  3.22.24: NAD Noted. SB on Tele. BP Stable. CP This AM, Went away on its own. No CP Currently. Right Radial Site soft.  3.23.24: NAD Noted. SR on Tele. BP Stable. On Heparin Infusion.  3.24.24: NAD Noted. Vitals Stable. SR on Tele.  3.25.24: NAD Noted. CABG Today. Tolerated well. SR on Tele. On Cleviprex Infusion. CO/CI 4.9/3.3.   3.26.24: NAD Noted. Progressing well. SR on Tele. Not requiring Pressors.     PMH: Hypertension, Claudication, SOB, CP, Chronic Tobacco Use  PSH: Wrist Surgery, Colostomy Closure  Family History: Father- Lung Cancer, Mother- Lung Cancer, Brother- Cardiac Pacemaker  Social History: Tobacco- Active Smoker, Alcohol- Negative, Substance Abuse- Negative     Previous Cardiac Diagnostics:   CABG (3.25.24):  Procedure:  Coronary artery bypass grafting X 3 ,   Left internal mammary artery to the LAD   Saphenous venous graft to   OM  Saphenous venous graft to    distal RCA  Ligation of left atrial appendage with a 35  mm atrial clip  Endoscopic venous harvesting left greater saphenous vein    Coronary Angiogram (3.21.24):  Coronary findings:  Dominance: right   Left main:  Calcified distal 90% stenosis extending ostium of the LAD.    Left anterior descending artery:  Type three-vessel with calcified ostial stenosis of at least 70%.    Circumflex artery:  Nondominant calcified vessel with proximal 40-50% stenosis.  The circumflex gives rise to a large patent bifurcating OM.  Right coronary artery:  Calcified dominant vessel with serial heavily calcified segment stenosis of up to 80-90%.  The RCA terminates in a moderate bifurcating PDA and a small PL segment.    Selective LIMA angiogram:  Large widely patent vessel  Aortic root angiogram:  Calcified Type 3 bovine arch with patent innominate and proximal ICA bilateral.  Abdominal angiogram: No AAA.  Left iliac system patent.  Left common femoral artery patent proximally.  Right common and internal iliac patent.  Right external iliac with 70-80% stenosis at the pelvic brim.  Right common femoral occluded.  Left ventriculography:  EF- 50%.    Hemodynamics:LV/AO= 0 mmHg                      LVEDP= 13-16 mmHg       Carotid US (3.21.24):  The right internal carotid artery demonstrated 50-69% stenosis.   The left internal carotid artery demonstrated less than 50% stenosis.  Bilateral vertebral arteries were patent with antegrade flow.     Echocardiogram (3.5.24):  The left ventricle is decreased in size. Global left ventricular systolic function is borderline normal. The left ventricular ejection fraction is 50%. The left ventricle diastolic function is impaired (Grade I) with normal left atrial pressure.   The right atrium is moderately enlarged ~5.1 cm.  Mild (1+) tricuspid and trace mitral regurgitation.  The pulmonary artery systolic pressure is 23 mmHg.  The study quality is below average.      CV US Arterial Lower Extremities (3.5.24):  The study quality is good.   The right  proximal superficial femoral artery is occluded.   The right mid and distal superficial femoral, posterior tibial, peroneal, anterior tibial, and dorsalis pedis arteries exhibit poor, diminished perfusion via collateral flow.  The right lower extremity arteries exhibit mono-phasic waveforms.  The left external iliac and common femoral arteries exhibit mono-phasic waveforms.  The remaining arteries of the left lower extremity exhibit bi-phasic waveforms.      PET (3.1.24):  This is an abnormal perfusion study. Study is consistent with significant anterior  ischemia.   This scan is suggestive of moderate to high risk for future cardiovascular events.   Large partially reversible perfusion abnormality of severe intensity in the anterior apical region. Large partially reversible perfusion abnormality of severe intensity in the anterior septal region. Small reversible perfusion abnormality of severe intensity in the apical lateral segment.   The left ventricular cavity is noted to be normal on the stress studies. The stress left ventricular ejection fraction was calculated to be 37% and left ventricular global function is moderately reduced. The rest left ventricular cavity is noted to be normal. The rest left ventricular ejection fraction was calculated to be 52% and rest left ventricular global function is normal.   Hypokinesis of apical anterior segment is noted only in the stress studies which is suggestive of new ischemia. Persistent hypokinesis of the septal region is noted in both rest and stress studies. When compared to the resting ejection fraction (52%), the stress ejection fraction (37%) has decreased.   Transient ischemic dilatation is present and has been described as a marker for high risk coronary artery disease. It has also been described in microvascular disease, hypertensive heart disease as well as cardiac deconditioning.   The study quality is good.   There was no rise in myocardial blood flow  between rest and stress.  Global myocardial blood flow reserve was 0.83.  Myocardial blood flow reserve is globally abnormal, placing the patient at a higher coronary event risk.    Review of Systems   Cardiovascular:  Negative for chest pain.   Respiratory:  Negative for shortness of breath.      Objective:     Vital Signs (Most Recent):  Temp: 97.7 °F (36.5 °C) (03/26/24 0800)  Pulse: 69 (03/26/24 0800)  Resp: (!) 22 (03/26/24 0827)  BP: 132/75 (03/26/24 0800)  SpO2: 98 % (03/26/24 0800) Vital Signs (24h Range):  Temp:  [96.8 °F (36 °C)-98.9 °F (37.2 °C)] 97.7 °F (36.5 °C)  Pulse:  [55-91] 69  Resp:  [10-45] 22  SpO2:  [90 %-100 %] 98 %  BP: ()/(58-79) 132/75  Arterial Line BP: ()/() 134/60   Weight: 52 kg (114 lb 10.2 oz)  Body mass index is 19.68 kg/m².  SpO2: 98 %       Intake/Output Summary (Last 24 hours) at 3/26/2024 0948  Last data filed at 3/26/2024 0600  Gross per 24 hour   Intake 5917.65 ml   Output 3578 ml   Net 2339.65 ml       Lines/Drains/Airways       Central Venous Catheter Line  Duration             Percutaneous Central Line - Double Lumen  03/25/24 1036 Internal Jugular Right <1 day              Drain  Duration                  Chest Tube Tube - 1 Anterior Mediastinal 28 Fr. -- days         Chest Tube Tube - 2 Left Pleural 28 Fr. -- days         Chest Tube 03/25/24 Tube - 3 Right Pleural 19 Fr. 1 day         Urethral Catheter 03/25/24  18 Fr. 1 day              Line  Duration                  Pacer Wires 03/25/24 1900 <1 day              Peripheral Intravenous Line  Duration                  Peripheral IV - Single Lumen 03/21/24 0900 20 G Anterior;Left Forearm 5 days         Peripheral IV - Single Lumen 03/22/24 2100 20 G Left Forearm 3 days                  Significant Labs:   Recent Results (from the past 72 hour(s))   APTT    Collection Time: 03/23/24  3:27 PM   Result Value Ref Range    PTT 68.3 (H) 23.2 - 33.7 seconds   APTT    Collection Time: 03/23/24  9:15 PM   Result  Value Ref Range    PTT 69.2 (H) 23.2 - 33.7 seconds   APTT    Collection Time: 03/24/24  4:34 AM   Result Value Ref Range    PTT 69.1 (H) 23.2 - 33.7 seconds   Basic Metabolic Panel    Collection Time: 03/24/24  4:34 AM   Result Value Ref Range    Sodium Level 140 136 - 145 mmol/L    Potassium Level 4.2 3.5 - 5.1 mmol/L    Chloride 104 98 - 107 mmol/L    Carbon Dioxide 26 23 - 31 mmol/L    Glucose Level 116 (H) 82 - 115 mg/dL    Blood Urea Nitrogen 16.2 8.4 - 25.7 mg/dL    Creatinine 0.87 0.73 - 1.18 mg/dL    BUN/Creatinine Ratio 19     Calcium Level Total 9.9 8.8 - 10.0 mg/dL    Anion Gap 10.0 mEq/L    eGFR >60 mls/min/1.73/m2   Magnesium    Collection Time: 03/24/24  4:34 AM   Result Value Ref Range    Magnesium Level 1.90 1.60 - 2.60 mg/dL   CBC with Differential    Collection Time: 03/24/24  4:34 AM   Result Value Ref Range    WBC 8.68 4.50 - 11.50 x10(3)/mcL    RBC 4.06 (L) 4.70 - 6.10 x10(6)/mcL    Hgb 12.7 (L) 14.0 - 18.0 g/dL    Hct 37.7 (L) 42.0 - 52.0 %    MCV 92.9 80.0 - 94.0 fL    MCH 31.3 (H) 27.0 - 31.0 pg    MCHC 33.7 33.0 - 36.0 g/dL    RDW 13.2 11.5 - 17.0 %    Platelet 265 130 - 400 x10(3)/mcL    MPV 10.0 7.4 - 10.4 fL    Neut % 64.8 %    Lymph % 22.5 %    Mono % 7.7 %    Eos % 3.5 %    Basophil % 1.2 %    Lymph # 1.95 0.6 - 4.6 x10(3)/mcL    Neut # 5.63 2.1 - 9.2 x10(3)/mcL    Mono # 0.67 0.1 - 1.3 x10(3)/mcL    Eos # 0.30 0 - 0.9 x10(3)/mcL    Baso # 0.10 <=0.2 x10(3)/mcL    IG# 0.03 0 - 0.04 x10(3)/mcL    IG% 0.3 %    NRBC% 0.0 %   MRSA PCR    Collection Time: 03/24/24  6:09 AM   Result Value Ref Range    MRSA PCR SCRN ([facility]) Not Detected Not Detected   Urinalysis    Collection Time: 03/24/24  6:11 AM   Result Value Ref Range    Color, UA Light-Yellow Yellow, Light-Yellow, Colorless, Straw, Dark-Yellow    Appearance, UA Clear Clear    Specific Gravity, UA 1.011 1.005 - 1.030    pH, UA 6.5 5.0 - 8.5    Protein, UA Negative Negative    Glucose, UA Normal Negative, Normal    Ketones, UA Negative  Negative    Blood, UA Negative Negative    Bilirubin, UA Negative Negative    Urobilinogen, UA Normal 0.2, 1.0, Normal    Nitrites, UA Negative Negative    Leukocyte Esterase, UA Negative Negative    WBC, UA 0-5 None Seen, 0-2, 3-5, 0-5 /HPF    Bacteria, UA None Seen None Seen, Trace /HPF    Squamous Epithelial Cells, UA Trace None Seen /HPF    RBC, UA 0-5 None Seen, 0-2, 3-5, 0-5 /HPF   Type & Screen    Collection Time: 03/24/24  6:46 AM   Result Value Ref Range    Group & Rh A POS     Indirect Jarrett GEL NEG     Specimen Outdate 03/27/2024 23:59    Prepare RBC 4 Units; ON CALL TO OR    Collection Time: 03/24/24  6:46 AM   Result Value Ref Range    UNIT NUMBER [id number]     UNIT ABO/RH A POS     DISPENSE STATUS Transfused     Unit Expiration [id number]     Product Code K9995A68     Unit Blood Type Code 6200     CROSSMATCH INTERPRETATION Compatible     UNIT NUMBER [id number]     UNIT ABO/RH A POS     DISPENSE STATUS Transfused     Unit Expiration [id number]     Product Code R5621N62     Unit Blood Type Code 6200     CROSSMATCH INTERPRETATION Compatible     UNIT NUMBER [id number]     UNIT ABO/RH A POS     DISPENSE STATUS Released     Unit Expiration [id number]     Product Code W0673J18     Unit Blood Type Code 6200     CROSSMATCH INTERPRETATION Compatible     UNIT NUMBER [id number]     UNIT ABO/RH A POS     DISPENSE STATUS Released     Unit Expiration [id number]     Product Code T6800H68     Unit Blood Type Code 6200     CROSSMATCH INTERPRETATION Compatible    ABORH RETYPE    Collection Time: 03/24/24  8:36 AM   Result Value Ref Range    ABORH Retype A POS    EKG 12-lead    Collection Time: 03/24/24  1:08 PM   Result Value Ref Range    QRS Duration 92 ms    [facility] QTC Calculation 438 ms   APTT    Collection Time: 03/25/24  3:07 AM   Result Value Ref Range    PTT 66.5 (H) 23.2 - 33.7 seconds   CBC with Differential    Collection Time: 03/25/24  3:07 AM   Result Value Ref Range    WBC 9.27 4.50 -  11.50 x10(3)/mcL    RBC 4.05 (L) 4.70 - 6.10 x10(6)/mcL    Hgb 12.7 (L) 14.0 - 18.0 g/dL    Hct 37.7 (L) 42.0 - 52.0 %    MCV 93.1 80.0 - 94.0 fL    MCH 31.4 (H) 27.0 - 31.0 pg    MCHC 33.7 33.0 - 36.0 g/dL    RDW 13.1 11.5 - 17.0 %    Platelet 254 130 - 400 x10(3)/mcL    MPV 10.2 7.4 - 10.4 fL    Neut % 62.2 %    Lymph % 24.4 %    Mono % 9.4 %    Eos % 2.7 %    Basophil % 1.0 %    Lymph # 2.26 0.6 - 4.6 x10(3)/mcL    Neut # 5.77 2.1 - 9.2 x10(3)/mcL    Mono # 0.87 0.1 - 1.3 x10(3)/mcL    Eos # 0.25 0 - 0.9 x10(3)/mcL    Baso # 0.09 <=0.2 x10(3)/mcL    IG# 0.03 0 - 0.04 x10(3)/mcL    IG% 0.3 %    NRBC% 0.0 %   POCT glucose    Collection Time: 03/25/24  4:15 AM   Result Value Ref Range    POCT Glucose 101 70 - 110 mg/dL   RT Blood Gas    Collection Time: 03/25/24  6:39 AM   Result Value Ref Range    Sample Type Arterial Blood     Sample site Arterial Line     Drawn by VR/holding     pH, Blood gas 7.420 7.350 - 7.450    pCO2, Blood gas 46.0 (H) 35.0 - 45.0 mmHg    pO2, Blood gas 116.0 (H) 80.0 - 100.0 mmHg    Sodium, Blood Gas 133 (L) 137 - 145 mmol/L    Potassium, Blood Gas 4.0 3.5 - 5.0 mmol/L    Calcium Level Ionized 1.20 1.12 - 1.23 mmol/L    TOC2, Blood gas 31.2 mmol/L    Base Excess, Blood gas 4.60 (H) -2.00 - 2.00 mmol/L    sO2, Blood gas 98.6 %    HCO3, Blood gas 29.8 (H) 22.0 - 26.0 mmol/L    THb, Blood gas 11.9 (L) 12 - 16 g/dL    O2 Hb, Blood Gas 97.2 (H) 94.0 - 97.0 %    CO Hgb 1.6 (H) 0.5 - 1.5 %    Met Hgb 0.9 0.4 - 1.5 %    Allens Test N/A     Oxygen Device, Blood gas Cannula     LPM 2    ISTAT PROCEDURE    Collection Time: 03/25/24  7:12 AM   Result Value Ref Range    POC PH 7.364 7.35 - 7.45    POC PCO2 47.1 (H) 35 - 45 mmHg    POC PO2 35 (LL) 40 - 60 mmHg    POC HCO3 26.8 24 - 28 mmol/L    POC BE 1 -2 to 2 mmol/L    POC SATURATED O2 65 95 - 100 %    POC pH Temp 7.378     POC pCO2 Temp 45.1 mmHg    POC pO2 Temp 33 mmHg    POC Glucose 96 70 - 110 mg/dL    POC Sodium 137 136 - 145 mmol/L    POC  Potassium 3.7 3.5 - 5.1 mmol/L    POC TCO2 28 24 - 29 mmol/L    POC Ionized Calcium 1.21 1.06 - 1.42 mmol/L    POC Hematocrit 31 (L) 36 - 54 %PCV    POC HEMOGLOBIN 11 g/dL    Sample VENOUS     FiO2 100     POC Temp 36.0 C    Basic Metabolic Panel    Collection Time: 03/25/24  9:01 AM   Result Value Ref Range    Sodium Level 138 136 - 145 mmol/L    Potassium Level 4.3 3.5 - 5.1 mmol/L    Chloride 114 (H) 98 - 107 mmol/L    Carbon Dioxide 19 (L) 23 - 31 mmol/L    Glucose Level 150 (H) 82 - 115 mg/dL    Blood Urea Nitrogen 16.0 8.4 - 25.7 mg/dL    Creatinine 0.67 (L) 0.73 - 1.18 mg/dL    BUN/Creatinine Ratio 24     Calcium Level Total 9.5 8.8 - 10.0 mg/dL    Anion Gap 5.0 mEq/L    eGFR >60 mls/min/1.73/m2   Protime-INR    Collection Time: 03/25/24  9:01 AM   Result Value Ref Range    PT 18.0 (H) 12.5 - 14.5 seconds    INR 1.5 (H) <=1.3   APTT    Collection Time: 03/25/24  9:01 AM   Result Value Ref Range    PTT 30.3 23.2 - 33.7 seconds   CBC with Differential    Collection Time: 03/25/24  9:01 AM   Result Value Ref Range    WBC 10.26 4.50 - 11.50 x10(3)/mcL    RBC 3.52 (L) 4.70 - 6.10 x10(6)/mcL    Hgb 11.1 (L) 14.0 - 18.0 g/dL    Hct 33.5 (L) 42.0 - 52.0 %    MCV 95.2 (H) 80.0 - 94.0 fL    MCH 31.5 (H) 27.0 - 31.0 pg    MCHC 33.1 33.0 - 36.0 g/dL    RDW 13.3 11.5 - 17.0 %    Platelet 108 (L) 130 - 400 x10(3)/mcL    MPV 10.3 7.4 - 10.4 fL    Neut % 67.5 %    Lymph % 26.3 %    Mono % 3.4 %    Eos % 1.3 %    Basophil % 0.6 %    Lymph # 2.70 0.6 - 4.6 x10(3)/mcL    Neut # 6.93 2.1 - 9.2 x10(3)/mcL    Mono # 0.35 0.1 - 1.3 x10(3)/mcL    Eos # 0.13 0 - 0.9 x10(3)/mcL    Baso # 0.06 <=0.2 x10(3)/mcL    IG# 0.09 (H) 0 - 0.04 x10(3)/mcL    IG% 0.9 %    NRBC% 0.0 %    IPF 4.7 0.9 - 11.2 %   ISTAT PROCEDURE    Collection Time: 03/25/24  9:07 AM   Result Value Ref Range    POC PH 7.396 7.35 - 7.45    POC PCO2 41.9 35 - 45 mmHg    POC PO2 46 40 - 60 mmHg    POC HCO3 25.7 24 - 28 mmol/L    POC BE 1 -2 to 2 mmol/L    POC SATURATED  O2 81 95 - 100 %    POC pH Temp 7.470     POC pCO2 Temp 33.7 mmHg    POC pO2 Temp 32 mmHg    POC Glucose 130 (H) 70 - 110 mg/dL    POC Sodium 135 (L) 136 - 145 mmol/L    POC Potassium 5.7 (H) 3.5 - 5.1 mmol/L    POC TCO2 27 24 - 29 mmol/L    POC Ionized Calcium 0.96 (L) 1.06 - 1.42 mmol/L    POC Hematocrit 22 (L) 36 - 54 %PCV    POC HEMOGLOBIN 8 g/dL    Sample VENOUS     FiO2 60    ISTAT PROCEDURE    Collection Time: 03/25/24  9:14 AM   Result Value Ref Range    POC PH 7.439 7.35 - 7.45    POC PCO2 37.4 35 - 45 mmHg    POC PO2 359 (H) 80 - 100 mmHg    POC HCO3 25.3 24 - 28 mmol/L    POC BE 1 -2 to 2 mmol/L    POC SATURATED O2 100 95 - 100 %    POC pH Temp 7.525     POC pCO2 Temp 29.0 mmHg    POC pO2 Temp 329 mmHg    POC Glucose 156 (H) 70 - 110 mg/dL    POC Sodium 135 (L) 136 - 145 mmol/L    POC Potassium 5.1 3.5 - 5.1 mmol/L    POC TCO2 26 23 - 27 mmol/L    POC Ionized Calcium 0.98 (L) 1.06 - 1.42 mmol/L    POC Hematocrit 22 (L) 36 - 54 %PCV    POC HEMOGLOBIN 8 g/dL    Sample ARTERIAL     FiO2 60     POC Temp 31.2 C    ISTAT PROCEDURE    Collection Time: 03/25/24  9:42 AM   Result Value Ref Range    POC PH 7.421 7.35 - 7.45    POC PCO2 39.3 35 - 45 mmHg    POC PO2 322 (H) 80 - 100 mmHg    POC HCO3 25.5 24 - 28 mmol/L    POC BE 1 -2 to 2 mmol/L    POC SATURATED O2 100 95 - 100 %    POC pH Temp 7.422     POC pCO2 Temp 39.1 mmHg    POC pO2 Temp 321 mmHg    POC Glucose 165 (H) 70 - 110 mg/dL    POC Sodium 136 136 - 145 mmol/L    POC Potassium 4.8 3.5 - 5.1 mmol/L    POC TCO2 27 23 - 27 mmol/L    POC Ionized Calcium 1.03 (L) 1.06 - 1.42 mmol/L    POC Hematocrit 23 (L) 36 - 54 %PCV    POC HEMOGLOBIN 8 g/dL    Sample ARTERIAL     FiO2 70     POC Temp 36.9 C    ISTAT PROCEDURE    Collection Time: 03/25/24 10:09 AM   Result Value Ref Range    POC PH 7.368 7.35 - 7.45    POC PCO2 44.3 35 - 45 mmHg    POC PO2 324 (H) 80 - 100 mmHg    POC HCO3 25.5 24 - 28 mmol/L    POC BE 0 -2 to 2 mmol/L    POC SATURATED O2 100 95 - 100 %     POC pH Temp 7.370     POC pCO2 Temp 44.1 mmHg    POC pO2 Temp 324 mmHg    POC Glucose 154 (H) 70 - 110 mg/dL    POC Sodium 136 136 - 145 mmol/L    POC Potassium 4.4 3.5 - 5.1 mmol/L    POC TCO2 27 23 - 27 mmol/L    POC Ionized Calcium 1.29 1.06 - 1.42 mmol/L    POC Hematocrit 23 (L) 36 - 54 %PCV    POC HEMOGLOBIN 8 g/dL    Sample ARTERIAL     FiO2 70     POC Temp 36.9 C    ISTAT PROCEDURE    Collection Time: 03/25/24 10:43 AM   Result Value Ref Range    POC PH 7.319 (L) 7.35 - 7.45    POC PCO2 43.9 35 - 45 mmHg    POC PO2 340 (H) 80 - 100 mmHg    POC HCO3 22.6 (L) 24 - 28 mmol/L    POC BE -4 (L) -2 to 2 mmol/L    POC SATURATED O2 100 95 - 100 %    POC Glucose 137 (H) 70 - 110 mg/dL    POC Sodium 138 136 - 145 mmol/L    POC Potassium 3.6 3.5 - 5.1 mmol/L    POC TCO2 24 23 - 27 mmol/L    POC Ionized Calcium 1.73 (H) 1.06 - 1.42 mmol/L    POC Hematocrit 21 (L) 36 - 54 %PCV    POC HEMOGLOBIN 7 g/dL    Sample ARTERIAL    POCT glucose    Collection Time: 03/25/24 11:42 AM   Result Value Ref Range    POCT Glucose 116 (H) 70 - 110 mg/dL   Magnesium    Collection Time: 03/25/24 12:21 PM   Result Value Ref Range    Magnesium Level 2.20 1.60 - 2.60 mg/dL   Phosphorus    Collection Time: 03/25/24 12:21 PM   Result Value Ref Range    Phosphorus Level 4.4 2.3 - 4.7 mg/dL   CBC Without Differential    Collection Time: 03/25/24 12:21 PM   Result Value Ref Range    WBC 13.87 (H) 4.50 - 11.50 x10(3)/mcL    RBC 4.39 (L) 4.70 - 6.10 x10(6)/mcL    Hgb 13.6 (L) 14.0 - 18.0 g/dL    Hct 40.7 (L) 42.0 - 52.0 %    MCV 92.7 80.0 - 94.0 fL    MCH 31.0 27.0 - 31.0 pg    MCHC 33.4 33.0 - 36.0 g/dL    RDW 13.4 11.5 - 17.0 %    Platelet 145 130 - 400 x10(3)/mcL    MPV 10.2 7.4 - 10.4 fL    IPF 6.2 0.9 - 11.2 %    NRBC% 0.0 %   Comprehensive Metabolic Panel    Collection Time: 03/25/24 12:21 PM   Result Value Ref Range    Sodium Level 139 136 - 145 mmol/L    Potassium Level 3.8 3.5 - 5.1 mmol/L    Chloride 112 (H) 98 - 107 mmol/L    Carbon Dioxide  19 (L) 23 - 31 mmol/L    Glucose Level 152 (H) 82 - 115 mg/dL    Blood Urea Nitrogen 14.6 8.4 - 25.7 mg/dL    Creatinine 0.70 (L) 0.73 - 1.18 mg/dL    Calcium Level Total 9.5 8.8 - 10.0 mg/dL    Protein Total 5.7 (L) 5.8 - 7.6 gm/dL    Albumin Level 3.4 3.4 - 4.8 g/dL    Globulin 2.3 (L) 2.4 - 3.5 gm/dL    Albumin/Globulin Ratio 1.5 1.1 - 2.0 ratio    Bilirubin Total 1.9 (H) <=1.5 mg/dL    Alkaline Phosphatase 61 40 - 150 unit/L    Alanine Aminotransferase 11 0 - 55 unit/L    Aspartate Aminotransferase 24 5 - 34 unit/L    eGFR >60 mls/min/1.73/m2   Hemoglobin and Hematocrit    Collection Time: 03/25/24 12:21 PM   Result Value Ref Range    Hgb 13.6 (L) 14.0 - 18.0 g/dL    Hct 40.7 (L) 42.0 - 52.0 %   Protime-INR    Collection Time: 03/25/24 12:21 PM   Result Value Ref Range    PT 15.7 (H) 12.5 - 14.5 seconds    INR 1.3 <=1.3   APTT    Collection Time: 03/25/24 12:21 PM   Result Value Ref Range    PTT 30.0 23.2 - 33.7 seconds   POCT glucose    Collection Time: 03/25/24  1:00 PM   Result Value Ref Range    POCT Glucose 169 (H) 70 - 110 mg/dL   RT Blood Gas    Collection Time: 03/25/24  1:02 PM   Result Value Ref Range    Sample Type Arterial Blood     Sample site Arterial Line     Drawn by LF,RRT     pH, Blood gas 7.250 (LL) 7.350 - 7.450    pCO2, Blood gas 51.0 (HH) 35.0 - 45.0 mmHg    pO2, Blood gas 86.0 80.0 - 100.0 mmHg    Sodium, Blood Gas 136 (L) 137 - 145 mmol/L    Potassium, Blood Gas 3.8 3.5 - 5.0 mmol/L    Calcium Level Ionized 1.35 (H) 1.12 - 1.23 mmol/L    TOC2, Blood gas 24.0 mmol/L    Base Excess, Blood gas -5.10 mmol/L    sO2, Blood gas 95.0 %    HCO3, Blood gas 22.4 22.0 - 26.0 mmol/L    Allens Test N/A     MODE SIMV     Oxygen Device, Blood gas Ventilator     FIO2, Blood gas 60 %    Mech Vt 500 ml    Mech RR 20 b/min    PEEP 5.0 cmH2O    PS 10.0 cmH2O    Itime (sec) 1 sec   Transesophageal echo (MANI)    Collection Time: 03/25/24  1:27 PM   Result Value Ref Range    BSA 1.48 m2   RT Blood Gas     Collection Time: 03/25/24  2:05 PM   Result Value Ref Range    Sample Type Arterial Blood     Sample site Arterial Line     Drawn by LF,RRT     pH, Blood gas 7.320 (L) 7.350 - 7.450    pCO2, Blood gas 44.0 35.0 - 45.0 mmHg    pO2, Blood gas 61.0 (L) 80.0 - 100.0 mmHg    Sodium, Blood Gas 135 (L) 137 - 145 mmol/L    Potassium, Blood Gas 3.4 (L) 3.5 - 5.0 mmol/L    Calcium Level Ionized 1.27 (H) 1.12 - 1.23 mmol/L    TOC2, Blood gas 24.1 mmol/L    Base Excess, Blood gas -3.40 mmol/L    sO2, Blood gas 89.0 %    HCO3, Blood gas 22.7 22.0 - 26.0 mmol/L    Allens Test N/A     MODE SIMV     Oxygen Device, Blood gas Ventilator     FIO2, Blood gas 30 %    Mech Vt 500 ml    Mech RR 20 b/min    PEEP 5.0 cmH2O    PS 10.0 cmH2O    Itime (sec) 1 sec   POCT glucose    Collection Time: 03/25/24  2:06 PM   Result Value Ref Range    POCT Glucose 153 (H) 70 - 110 mg/dL   POCT glucose    Collection Time: 03/25/24  3:07 PM   Result Value Ref Range    POCT Glucose 94 70 - 110 mg/dL   POCT glucose    Collection Time: 03/25/24  4:18 PM   Result Value Ref Range    POCT Glucose 60 (L) 70 - 110 mg/dL   RT Blood Gas    Collection Time: 03/25/24  4:30 PM   Result Value Ref Range    Sample Type Arterial Blood     Sample site Arterial Line     Drawn by LF,RRT     pH, Blood gas 7.370 7.350 - 7.450    pCO2, Blood gas 41.0 35.0 - 45.0 mmHg    pO2, Blood gas 91.0 80.0 - 100.0 mmHg    Sodium, Blood Gas 136 (L) 137 - 145 mmol/L    Potassium, Blood Gas 3.4 (L) 3.5 - 5.0 mmol/L    Calcium Level Ionized 1.14 1.12 - 1.23 mmol/L    TOC2, Blood gas 25.0 mmol/L    Base Excess, Blood gas -1.50 mmol/L    sO2, Blood gas 97.0 %    HCO3, Blood gas 23.7 22.0 - 26.0 mmol/L    Allens Test N/A     MODE CPAP     Oxygen Device, Blood gas Ventilator     FIO2, Blood gas 40 %    PEEP 5.0 cmH2O    PS 10.0 cmH2O   POCT glucose    Collection Time: 03/25/24  4:53 PM   Result Value Ref Range    POCT Glucose 69 (L) 70 - 110 mg/dL   Potassium    Collection Time: 03/25/24  5:02 PM    Result Value Ref Range    Potassium Level 3.5 3.5 - 5.1 mmol/L   Hemoglobin and Hematocrit    Collection Time: 03/25/24  5:02 PM   Result Value Ref Range    Hgb 10.6 (L) 14.0 - 18.0 g/dL    Hct 31.5 (L) 42.0 - 52.0 %   POCT glucose    Collection Time: 03/25/24  6:03 PM   Result Value Ref Range    POCT Glucose 87 70 - 110 mg/dL   POCT glucose    Collection Time: 03/25/24  9:15 PM   Result Value Ref Range    POCT Glucose 101 70 - 110 mg/dL   Potassium    Collection Time: 03/25/24 10:34 PM   Result Value Ref Range    Potassium Level 4.4 3.5 - 5.1 mmol/L   POCT glucose    Collection Time: 03/25/24 11:55 PM   Result Value Ref Range    POCT Glucose 122 (H) 70 - 110 mg/dL   POCT glucose    Collection Time: 03/26/24  3:07 AM   Result Value Ref Range    POCT Glucose 124 (H) 70 - 110 mg/dL   Comprehensive Metabolic Panel    Collection Time: 03/26/24  3:08 AM   Result Value Ref Range    Sodium Level 135 (L) 136 - 145 mmol/L    Potassium Level 4.3 3.5 - 5.1 mmol/L    Chloride 109 (H) 98 - 107 mmol/L    Carbon Dioxide 19 (L) 23 - 31 mmol/L    Glucose Level 140 (H) 82 - 115 mg/dL    Blood Urea Nitrogen 14.1 8.4 - 25.7 mg/dL    Creatinine 0.74 0.73 - 1.18 mg/dL    Calcium Level Total 8.3 (L) 8.8 - 10.0 mg/dL    Protein Total 5.0 (L) 5.8 - 7.6 gm/dL    Albumin Level 3.1 (L) 3.4 - 4.8 g/dL    Globulin 1.9 (L) 2.4 - 3.5 gm/dL    Albumin/Globulin Ratio 1.6 1.1 - 2.0 ratio    Bilirubin Total 1.9 (H) <=1.5 mg/dL    Alkaline Phosphatase 48 40 - 150 unit/L    Alanine Aminotransferase 21 0 - 55 unit/L    Aspartate Aminotransferase 83 (H) 5 - 34 unit/L    eGFR >60 mls/min/1.73/m2   CBC Without Differential    Collection Time: 03/26/24  3:08 AM   Result Value Ref Range    WBC 10.01 4.50 - 11.50 x10(3)/mcL    RBC 3.65 (L) 4.70 - 6.10 x10(6)/mcL    Hgb 11.4 (L) 14.0 - 18.0 g/dL    Hct 33.5 (L) 42.0 - 52.0 %    MCV 91.8 80.0 - 94.0 fL    MCH 31.2 (H) 27.0 - 31.0 pg    MCHC 34.0 33.0 - 36.0 g/dL    RDW 14.6 11.5 - 17.0 %    Platelet 142 130 -  400 x10(3)/mcL    MPV 10.9 (H) 7.4 - 10.4 fL    IPF 6.7 0.9 - 11.2 %    NRBC% 0.0 %   Phosphorus    Collection Time: 03/26/24  3:08 AM   Result Value Ref Range    Phosphorus Level 3.0 2.3 - 4.7 mg/dL   Magnesium    Collection Time: 03/26/24  3:08 AM   Result Value Ref Range    Magnesium Level 1.80 1.60 - 2.60 mg/dL   APTT    Collection Time: 03/26/24  3:08 AM   Result Value Ref Range    PTT 33.0 23.2 - 33.7 seconds   POCT glucose    Collection Time: 03/26/24  8:20 AM   Result Value Ref Range    POCT Glucose 124 (H) 70 - 110 mg/dL     Telemetry:  Sinus Rhythm     Physical Exam  Vitals and nursing note reviewed.   Constitutional:       Appearance: Normal appearance.   HENT:      Head: Normocephalic.      Mouth/Throat:      Mouth: Mucous membranes are moist.      Pharynx: Oropharynx is clear.   Neck:      Comments: Right IJ Venous Cath  Cardiovascular:      Rate and Rhythm: Normal rate and regular rhythm.      Pulses: Normal pulses.      Heart sounds: Normal heart sounds.   Pulmonary:      Effort: Pulmonary effort is normal. No respiratory distress.      Breath sounds: Normal breath sounds.   Abdominal:      General: There is no distension.      Palpations: Abdomen is soft.      Tenderness: There is no abdominal tenderness. There is no guarding.   Musculoskeletal:         General: Normal range of motion.      Cervical back: Neck supple.      Right lower leg: No edema.      Left lower leg: No edema.      Comments: Chest Tubes in Place    Skin:     General: Skin is warm and dry.      Comments: Mid Sternal Incision with Dressing in Place.    Neurological:      General: No focal deficit present.      Mental Status: He is alert and oriented to person, place, and time. Mental status is at baseline.      Motor: No weakness.   Psychiatric:         Mood and Affect: Mood normal.         Behavior: Behavior normal.       Current Inpatient Medications:    Current Facility-Administered Medications:     acetaminophen oral solution  650 mg, 650 mg, Per OG tube, Q6H PRN, Swetha, Yoel P, PA, 650 mg at 03/25/24 1622    albumin human 5% bottle 12.5 g, 12.5 g, Intravenous, PRN, Avinashs, Yoel P, PA, Stopped at 03/25/24 1704    aspirin EC tablet 81 mg, 81 mg, Oral, Daily, Yoel Posada P, PA, 81 mg at 03/26/24 0815    atorvastatin tablet 40 mg, 40 mg, Oral, Daily, Brock Young, FNP, 40 mg at 03/26/24 0815    calcium gluconate 1 g in NS IVPB (premixed), 1 g, Intravenous, PRN, Swetha, Yoel P, PA    calcium gluconate 1 g in NS IVPB (premixed), 2 g, Intravenous, PRN, Boais, Yoel P, PA    calcium gluconate 1 g in NS IVPB (premixed), 3 g, Intravenous, PRN, Swetha, Yoel P, PA    cefUROXime sodium 1.5 g in dextrose 5 % in water (D5W) 100 mL IVPB (MB+), 1.5 g, Intravenous, On Call Procedure, Reji Sullivan MD    clevidipine (CLEVIPREX) 25 mg/50 mL infusion, 0-16 mg/hr, Intravenous, Continuous, Reji Sullivan MD, Last Rate: 0 mL/hr at 03/25/24 1500, 0 mg/hr at 03/25/24 1500    dexmedetomidine (PRECEDEX) 400mcg/100mL 0.9% NaCL infusion, 0-1.4 mcg/kg/hr, Intravenous, Continuous, Yoel Posada P, PA, Last Rate: 0 mL/hr at 03/25/24 1530, 0 mcg/kg/hr at 03/25/24 1530    dextrose 5 % and 0.45 % NaCl infusion, , Intravenous, Continuous, Yoel Posada P, PA, Last Rate: 100 mL/hr at 03/25/24 2356, New Bag at 03/25/24 2356    diazePAM tablet 10 mg, 10 mg, Oral, On Call Procedure, Monse Lima MD, 10 mg at 03/21/24 1021    diphenhydrAMINE capsule 50 mg, 50 mg, Oral, On Call Procedure, Monse Lima MD, 50 mg at 03/21/24 1021    docusate sodium capsule 100 mg, 100 mg, Oral, BID, Yoel Posada PA, 100 mg at 03/26/24 0816    electrolyte-A infusion, , Intravenous, Continuous, Reji Sullivan MD, Last Rate: 50 mL/hr at 03/25/24 1637, New Bag at 03/25/24 1637    enoxaparin injection 40 mg, 40 mg, Subcutaneous, Daily, Yoel Posada PA    EPINEPHrine (ADRENALIN) 5 mg in dextrose 5 % (D5W) 250 mL infusion, 0-2  mcg/kg/min, Intravenous, Continuous, Yoel Posada P, PA, Last Rate: 2.8 mL/hr at 03/25/24 1637, 0.02 mcg/kg/min at 03/25/24 1637    famotidine (PF) injection 20 mg, 20 mg, Intravenous, Daily, Yoel Posada P, PA, 20 mg at 03/26/24 0816    folic acid tablet 1 mg, 1 mg, Oral, Daily, Yoel Posada P, PA, 1 mg at 03/26/24 0816    heparin, porcine (PF) 100 unit/mL injection flush 500 Units, 5 mL, Intravenous, On Call Procedure, Reji Sullivan MD    hydrALAZINE injection 10 mg, 10 mg, Intravenous, Q4H PRN, Chetna Rivera FNP, 10 mg at 03/22/24 1903    HYDROcodone-acetaminophen 5-325 mg per tablet 1 tablet, 1 tablet, Oral, Q4H PRN, Yoel Posada P, PA    insulin regular in 0.9 % NaCl 100 unit/100 mL (1 unit/mL) infusion, 0-52 Units/hr, Intravenous, Continuous, Yoel Posada P, PA, Last Rate: 3 mL/hr at 03/25/24 1135, 3 Units/hr at 03/25/24 1135    lactulose 10 gram/15 ml solution 20 g, 20 g, Oral, Q6H PRN, Yoel Posada P, PA    LIDOcaine HCL 20 mg/ml (2%) injection 2 mL, 2 mL, Intradermal, Once, Reji Sullivan MD    loperamide capsule 2 mg, 2 mg, Oral, Continuous PRN, Yoel Posada P, PA    magnesium sulfate 2g in water 50mL IVPB (premix), 2 g, Intravenous, PRN, Yoel Posada P, PA, Stopped at 03/26/24 0644    magnesium sulfate 2g in water 50mL IVPB (premix), 4 g, Intravenous, PRN, Yoel Posada P, PA    metoclopramide injection 5 mg, 5 mg, Intravenous, Q6H PRN, Yoel Posada P, PA    morphine injection 2 mg, 2 mg, Intravenous, Q4H PRN, Karla Joseph FNP, 2 mg at 03/25/24 2020    morphine injection 4 mg, 4 mg, Intravenous, Q4H PRN, Yoel Posada PA    mupirocin 2 % ointment, , Nasal, On Call Procedure, Reji Sullivan MD    mupirocin 2 % ointment, , Nasal, BID, Yoel Posada PA, Given at 03/25/24 2026    nitroGLYCERIN in 5 % dextrose 50 mg/250 mL (200 mcg/mL) infusion, 0-400 mcg/min, Intravenous, Continuous, Ba Alves., NP, Stopped at 03/25/24 0932     nitroGLYCERIN SL tablet 0.4 mg, 0.4 mg, Sublingual, Q5 Min PRN, Chetna Rivera T, FNP, 0.4 mg at 03/22/24 1900    ondansetron injection 4 mg, 4 mg, Intravenous, Q12H PRN, Brock Young S, FNP    ondansetron injection 4 mg, 4 mg, Intravenous, Q4H PRN, Yoel Posada P, PA    oxyCODONE immediate release tablet 10 mg, 10 mg, Oral, Q4H PRN, Yoel Posada P, PA, 10 mg at 03/26/24 0827    pantoprazole EC tablet 40 mg, 40 mg, Oral, Daily, Chetna Rivera T, FNP, 40 mg at 03/26/24 0815    potassium chloride 20 mEq in 100 mL IVPB (FOR CENTRAL LINE ADMINISTRATION ONLY), 20 mEq, Intravenous, PRN, Yoel Posada P, PA, Last Rate: 50 mL/hr at 03/25/24 1817, 20 mEq at 03/25/24 1817    potassium chloride 20 mEq in 100 mL IVPB (FOR CENTRAL LINE ADMINISTRATION ONLY), 40 mEq, Intravenous, PRN, Yoel Posada P, PA    potassium chloride 20 mEq in 100 mL IVPB (FOR CENTRAL LINE ADMINISTRATION ONLY), 20 mEq, Intravenous, PRN, Yoel Posada P, PA    sodium chloride 0.9% flush 10 mL, 10 mL, Intravenous, PRN, Monse Lima MD    sodium phosphate 15 mmol in dextrose 5 % (D5W) 250 mL IVPB, 15 mmol, Intravenous, PRN, Yoel Posdaa P, PA    sucralfate tablet 1 g, 1 g, Oral, QID (AC & HS), Yoel Posada P, PA  VTE Risk Mitigation (From admission, onward)           Ordered     enoxaparin injection 40 mg  Daily         03/25/24 1134     Place IVANA hose  Until discontinued         03/25/24 1136     Place sequential compression device  Until discontinued         03/25/24 1134     heparin, porcine (PF) 100 unit/mL injection flush 500 Units  On Call Procedure         03/24/24 7536                  Assessment/Plan:   CAD (Multivessel)- Status Post CABG (3.25.24) LIMA to LAD, SVG to OM, SVG to Distal RCA (HANK Ligation)    - LM: 90% Distal, LAD: 70% Ostial, LCX: 40-50% Proximal, RCA: (Dominant) 80-90% Serially Calcified Lesions (EF 50%) (3.21.24)  Hypertension  PAD    - REIA 70-80% Stenosis at Pelvic Brim (3.21.24)  Nicotine  Dependence/Chronic Tobacco Use  ROBBI    - 50-69% MUSTAPHA (CUS 3.21.24)  Anemia    Impression:  Continue Aspirin 81 Mg Daily & Continue Statin   Resume Antihypertensives as needed  Replete Mag  Continue Routine Post Op Care as per ICU Team  Advance Mobilization as Able and Continue Regular IS Usage    BOUBACAR Leigh  Cardiology  [facility] - 6th Floor Medical Telemetry  03/26/2024     Physician addendum:  I have seen and examined this patient as a split-shared visit with the MATHIEU d/t complicated medical management of above problems written in assessment and high acuity requiring physician expertise in medical decision-making. I performed the substantive portion of the history and exam. Above medical decision-making is also formulated by me.    Cardiovascular exam:  S1, S2  Lungs:  fine crackles at bases.  Extremities:  1+ edema bilaterally    Plan:  Medications as above.  Continue supportive therapy.  Advised mobilization.      Ludwin Schneider MD  Cardiologist

## 2024-03-26 NOTE — PROGRESS NOTES
CT SURGERY PROGRESS NOTE  Ata Pickens  68 y.o.  1955    Patients Procedure: Procedure(s) (LRB):  CORONARY ARTERY BYPASS GRAFT (CABG) (N/A)  SURGICAL PROCUREMENT, VEIN, ENDOSCOPIC (Left)  EXCLUSION, LEFT ATRIAL APPENDAGE (N/A)    Subjective  Interval History: Patient is postoperative day 1 from CABG and ligation of left atrial appendage.  No acute events overnight.  Up in chair this morning.  Off all vasopressors.  Remains on 3L NC.      Review of Systems   Constitutional:  Negative for chills, fever and malaise/fatigue.   Respiratory:  Negative for cough, shortness of breath and wheezing.    Cardiovascular:  Positive for chest pain (incisional). Negative for palpitations.   Gastrointestinal:  Negative for nausea and vomiting.       Medication List  Infusions   clevidipine 0 mg/hr (03/25/24 1500)    dexmedeTOMIDine (Precedex) infusion (titrating) 0 mcg/kg/hr (03/25/24 1530)    dextrose 5 % and 0.45 % NaCl 100 mL/hr at 03/25/24 2356    electrolyte-A 50 mL/hr at 03/25/24 1637    EPINEPHrine 0.02 mcg/kg/min (03/25/24 1637)    insulin regular 1 units/mL infusion orderable (CTS POST-OP) 3 Units/hr (03/25/24 1135)    loperamide      nitroGLYCERIN Stopped (03/25/24 0932)     Scheduled   aspirin  81 mg Oral Daily    atorvastatin  40 mg Oral Daily    docusate sodium  100 mg Oral BID    enoxparin  40 mg Subcutaneous Daily    famotidine (PF)  20 mg Intravenous Daily    folic acid  1 mg Oral Daily    LIDOcaine HCL 20 mg/ml (2%)  2 mL Intradermal Once    mupirocin   Nasal BID    pantoprazole  40 mg Oral Daily    sucralfate  1 g Oral QID (AC & HS)       Objective:  Recent Vitals:  Temp:  [96.8 °F (36 °C)-98.9 °F (37.2 °C)] 97.7 °F (36.5 °C)  Pulse:  [55-91] 69  Resp:  [10-45] 22  SpO2:  [90 %-100 %] 98 %  BP: ()/(58-79) 132/75  Arterial Line BP: ()/() 134/60    Physical Exam  Constitutional:       General: He is not in acute distress.     Appearance: Normal appearance. He is not ill-appearing.   HENT:       Head: Normocephalic and atraumatic.   Cardiovascular:      Rate and Rhythm: Normal rate and regular rhythm.      Pulses: Normal pulses.      Heart sounds: Normal heart sounds. No murmur heard.     No friction rub. No gallop.   Pulmonary:      Effort: Pulmonary effort is normal. No respiratory distress.      Breath sounds: Normal breath sounds. No wheezing, rhonchi or rales.      Comments: Ct x 3.  Dressed. C/d/i  Abdominal:      General: There is no distension.      Palpations: Abdomen is soft.   Musculoskeletal:      Right lower leg: No edema.      Left lower leg: No edema.   Skin:     General: Skin is warm and dry.      Comments: Median sternotomy incision dressed - c/d/i   Neurological:      General: No focal deficit present.      Mental Status: He is oriented to person, place, and time. He is lethargic.   Psychiatric:         Mood and Affect: Mood normal.         Behavior: Behavior normal.          I/O last 24 hrs:  Intake/Output - Last 3 Shifts         03/24 0700  03/25 0659 03/25 0700 03/26 0659 03/26 0700 03/27 0659    P.O. 840      I.V. (mL/kg)  4125.7 (79.3)     Blood  637     IV Piggyback  1155     Total Intake(mL/kg) 840 (17.8) 5917.7 (113.8)     Urine (mL/kg/hr) 1300 (1.1) 1993 (1.6)     Stool 0      Blood  600     Chest Tube  1165     Total Output 1300 3758     Net -460 +2159.7            Stool Occurrence 0 x              Labs  CBC:   Recent Labs   Lab 03/26/24  0308   WBC 10.01   RBC 3.65*   HGB 11.4*   HCT 33.5*      MCV 91.8   MCH 31.2*   MCHC 34.0     CMP:   Recent Labs   Lab 03/26/24  0308   CALCIUM 8.3*   ALBUMIN 3.1*   *   K 4.3   CO2 19*   BUN 14.1   CREATININE 0.74   ALKPHOS 48   ALT 21   AST 83*   BILITOT 1.9*     LFTs:   Recent Labs   Lab 03/26/24  0308   ALT 21   AST 83*   ALKPHOS 48   BILITOT 1.9*   ALBUMIN 3.1*     All pertinent labs from the last 24 hours have been reviewed.      Imaging:   CXR: X-Ray Chest AP Portable    Result Date: 3/25/2024  As above. Electronically  signed by: Darío Little Date:    03/25/2024 Time:    12:07   I have reviewed all pertinent imaging results/findings within the past 24 hours.        ASSESSMENT/PLAN:    Severe coronary artery disease  HTN  PAD  ROBBI    -s/p CABG and LLAA  -chest tubes draining.  Serosanguinous.  Continue to suction.    -CXR stable  -H&H 11.4 / 33.5  -Cr 0.74  -Leave still in place due to difficult insertion in OR  -Wean oxygen as tolerated  -Progressive mobilization and regular IS use.  -Downgrade    Case and plan of care discussed with Dr. Laurie Joseph, LEXIIP

## 2024-03-27 LAB
ALBUMIN SERPL-MCNC: 3 G/DL (ref 3.4–4.8)
ALBUMIN/GLOB SERPL: 1.2 RATIO (ref 1.1–2)
ALP SERPL-CCNC: 59 UNIT/L (ref 40–150)
ALT SERPL-CCNC: 23 UNIT/L (ref 0–55)
AST SERPL-CCNC: 85 UNIT/L (ref 5–34)
BASOPHILS # BLD AUTO: 0.06 X10(3)/MCL
BASOPHILS NFR BLD AUTO: 0.5 %
BILIRUB SERPL-MCNC: 1.3 MG/DL
BUN SERPL-MCNC: 12.6 MG/DL (ref 8.4–25.7)
CALCIUM SERPL-MCNC: 8.6 MG/DL (ref 8.8–10)
CHLORIDE SERPL-SCNC: 105 MMOL/L (ref 98–107)
CO2 SERPL-SCNC: 20 MMOL/L (ref 23–31)
CREAT SERPL-MCNC: 0.67 MG/DL (ref 0.73–1.18)
EOSINOPHIL # BLD AUTO: 0.02 X10(3)/MCL (ref 0–0.9)
EOSINOPHIL NFR BLD AUTO: 0.2 %
ERYTHROCYTE [DISTWIDTH] IN BLOOD BY AUTOMATED COUNT: 14.3 % (ref 11.5–17)
ERYTHROCYTE [DISTWIDTH] IN BLOOD BY AUTOMATED COUNT: 14.3 % (ref 11.5–17)
GFR SERPLBLD CREATININE-BSD FMLA CKD-EPI: >60 MLS/MIN/1.73/M2
GLOBULIN SER-MCNC: 2.5 GM/DL (ref 2.4–3.5)
GLUCOSE SERPL-MCNC: 150 MG/DL (ref 82–115)
HCT VFR BLD AUTO: 31.8 % (ref 42–52)
HCT VFR BLD AUTO: 32.8 % (ref 42–52)
HGB BLD-MCNC: 10.9 G/DL (ref 14–18)
HGB BLD-MCNC: 11 G/DL (ref 14–18)
IMM GRANULOCYTES # BLD AUTO: 0.09 X10(3)/MCL (ref 0–0.04)
IMM GRANULOCYTES NFR BLD AUTO: 0.7 %
LYMPHOCYTES # BLD AUTO: 0.87 X10(3)/MCL (ref 0.6–4.6)
LYMPHOCYTES NFR BLD AUTO: 6.8 %
MAGNESIUM SERPL-MCNC: 1.9 MG/DL (ref 1.6–2.6)
MCH RBC QN AUTO: 30.7 PG (ref 27–31)
MCH RBC QN AUTO: 31.4 PG (ref 27–31)
MCHC RBC AUTO-ENTMCNC: 33.2 G/DL (ref 33–36)
MCHC RBC AUTO-ENTMCNC: 34.6 G/DL (ref 33–36)
MCV RBC AUTO: 90.9 FL (ref 80–94)
MCV RBC AUTO: 92.4 FL (ref 80–94)
MONOCYTES # BLD AUTO: 1.07 X10(3)/MCL (ref 0.1–1.3)
MONOCYTES NFR BLD AUTO: 8.4 %
NEUTROPHILS # BLD AUTO: 10.65 X10(3)/MCL (ref 2.1–9.2)
NEUTROPHILS NFR BLD AUTO: 83.4 %
NRBC BLD AUTO-RTO: 0 %
NRBC BLD AUTO-RTO: 0 %
PLATELET # BLD AUTO: 125 X10(3)/MCL (ref 130–400)
PLATELET # BLD AUTO: 125 X10(3)/MCL (ref 130–400)
PLATELETS.RETICULATED NFR BLD AUTO: 6.8 % (ref 0.9–11.2)
PMV BLD AUTO: 11.3 FL (ref 7.4–10.4)
PMV BLD AUTO: 11.4 FL (ref 7.4–10.4)
POTASSIUM SERPL-SCNC: 4.1 MMOL/L (ref 3.5–5.1)
PROT SERPL-MCNC: 5.5 GM/DL (ref 5.8–7.6)
RBC # BLD AUTO: 3.5 X10(6)/MCL (ref 4.7–6.1)
RBC # BLD AUTO: 3.55 X10(6)/MCL (ref 4.7–6.1)
SODIUM SERPL-SCNC: 132 MMOL/L (ref 136–145)
WBC # SPEC AUTO: 12.76 X10(3)/MCL (ref 4.5–11.5)
WBC # SPEC AUTO: 12.8 X10(3)/MCL (ref 4.5–11.5)

## 2024-03-27 PROCEDURE — 21400001 HC TELEMETRY ROOM

## 2024-03-27 PROCEDURE — 25000003 PHARM REV CODE 250: Performed by: NURSE PRACTITIONER

## 2024-03-27 PROCEDURE — 63600175 PHARM REV CODE 636 W HCPCS

## 2024-03-27 PROCEDURE — 27000221 HC OXYGEN, UP TO 24 HOURS

## 2024-03-27 PROCEDURE — 63600175 PHARM REV CODE 636 W HCPCS: Performed by: PHYSICIAN ASSISTANT

## 2024-03-27 PROCEDURE — 99024 POSTOP FOLLOW-UP VISIT: CPT | Mod: ,,,

## 2024-03-27 PROCEDURE — 80053 COMPREHEN METABOLIC PANEL: CPT | Performed by: NURSE PRACTITIONER

## 2024-03-27 PROCEDURE — 25000003 PHARM REV CODE 250: Performed by: INTERNAL MEDICINE

## 2024-03-27 PROCEDURE — 25000003 PHARM REV CODE 250: Performed by: PHYSICIAN ASSISTANT

## 2024-03-27 PROCEDURE — 85027 COMPLETE CBC AUTOMATED: CPT | Performed by: PHYSICIAN ASSISTANT

## 2024-03-27 PROCEDURE — 11000001 HC ACUTE MED/SURG PRIVATE ROOM

## 2024-03-27 PROCEDURE — 83735 ASSAY OF MAGNESIUM: CPT | Performed by: NURSE PRACTITIONER

## 2024-03-27 PROCEDURE — 85025 COMPLETE CBC W/AUTO DIFF WBC: CPT | Performed by: NURSE PRACTITIONER

## 2024-03-27 PROCEDURE — 94760 N-INVAS EAR/PLS OXIMETRY 1: CPT

## 2024-03-27 PROCEDURE — 97110 THERAPEUTIC EXERCISES: CPT

## 2024-03-27 RX ORDER — ALUMINUM HYDROXIDE, MAGNESIUM HYDROXIDE, AND SIMETHICONE 1200; 120; 1200 MG/30ML; MG/30ML; MG/30ML
30 SUSPENSION ORAL
Status: DISCONTINUED | OUTPATIENT
Start: 2024-03-27 | End: 2024-03-27

## 2024-03-27 RX ORDER — AMLODIPINE BESYLATE 5 MG/1
5 TABLET ORAL DAILY
Status: DISCONTINUED | OUTPATIENT
Start: 2024-03-27 | End: 2024-04-05 | Stop reason: HOSPADM

## 2024-03-27 RX ORDER — FUROSEMIDE 10 MG/ML
40 INJECTION INTRAMUSCULAR; INTRAVENOUS ONCE
Status: COMPLETED | OUTPATIENT
Start: 2024-03-27 | End: 2024-03-27

## 2024-03-27 RX ORDER — ALUMINUM HYDROXIDE, MAGNESIUM HYDROXIDE, AND SIMETHICONE 1200; 120; 1200 MG/30ML; MG/30ML; MG/30ML
30 SUSPENSION ORAL EVERY 4 HOURS PRN
Status: DISCONTINUED | OUTPATIENT
Start: 2024-03-27 | End: 2024-04-05 | Stop reason: HOSPADM

## 2024-03-27 RX ADMIN — ALUMINUM HYDROXIDE, MAGNESIUM HYDROXIDE, AND DIMETHICONE 30 ML: 200; 20; 200 SUSPENSION ORAL at 01:03

## 2024-03-27 RX ADMIN — ASPIRIN 81 MG: 81 TABLET, COATED ORAL at 08:03

## 2024-03-27 RX ADMIN — OXYCODONE HYDROCHLORIDE 10 MG: 5 TABLET ORAL at 12:03

## 2024-03-27 RX ADMIN — ONDANSETRON 4 MG: 2 INJECTION INTRAMUSCULAR; INTRAVENOUS at 02:03

## 2024-03-27 RX ADMIN — FOLIC ACID 1 MG: 1 TABLET ORAL at 08:03

## 2024-03-27 RX ADMIN — ENOXAPARIN SODIUM 40 MG: 40 INJECTION SUBCUTANEOUS at 04:03

## 2024-03-27 RX ADMIN — AMLODIPINE BESYLATE 5 MG: 5 TABLET ORAL at 01:03

## 2024-03-27 RX ADMIN — OXYCODONE HYDROCHLORIDE 10 MG: 5 TABLET ORAL at 09:03

## 2024-03-27 RX ADMIN — OXYCODONE HYDROCHLORIDE 10 MG: 5 TABLET ORAL at 06:03

## 2024-03-27 RX ADMIN — OXYCODONE HYDROCHLORIDE 10 MG: 5 TABLET ORAL at 03:03

## 2024-03-27 RX ADMIN — DOCUSATE SODIUM 100 MG: 50 CAPSULE, LIQUID FILLED ORAL at 09:03

## 2024-03-27 RX ADMIN — ATORVASTATIN CALCIUM 40 MG: 40 TABLET, FILM COATED ORAL at 08:03

## 2024-03-27 RX ADMIN — DOCUSATE SODIUM 100 MG: 50 CAPSULE, LIQUID FILLED ORAL at 08:03

## 2024-03-27 RX ADMIN — SUCRALFATE 1 G: 1 TABLET ORAL at 06:03

## 2024-03-27 RX ADMIN — SUCRALFATE 1 G: 1 TABLET ORAL at 09:03

## 2024-03-27 RX ADMIN — OXYCODONE HYDROCHLORIDE 10 MG: 5 TABLET ORAL at 10:03

## 2024-03-27 RX ADMIN — METOPROLOL SUCCINATE 12.5 MG: 25 TABLET, EXTENDED RELEASE ORAL at 01:03

## 2024-03-27 RX ADMIN — PANTOPRAZOLE SODIUM 40 MG: 40 TABLET, DELAYED RELEASE ORAL at 08:03

## 2024-03-27 RX ADMIN — FUROSEMIDE 40 MG: 10 INJECTION, SOLUTION INTRAMUSCULAR; INTRAVENOUS at 01:03

## 2024-03-27 NOTE — NURSING
Nurses Note -- 4 Eyes      3/27/2024   10:09 AM      Skin assessed during: Transfer      [x] No Altered Skin Integrity Present    []Prevention Measures Documented      [] Yes- Altered Skin Integrity Present or Discovered   [] LDA Added if Not in Epic (Describe Wound)   [] New Altered Skin Integrity was Present on Admit and Documented in LDA   [] Wound Image Taken    Wound Care Consulted? No    Attending Nurse:  Sabrina Forte RN/Staff Member:   Lynne Jesus RN

## 2024-03-27 NOTE — PROGRESS NOTES
Inpatient Nutrition Assessment    Admit Date: 3/21/2024   Total duration of encounter: 6 days   Patient Age: 68 y.o.    Nutrition Recommendation/Prescription     Diet Heart Healthy ordered  Add Boost VHC TID (provides 530 kcal and 22 g protein per container)  Encouraged adequate PO intake  Monitor appetite/PO intake, weight, and labs    Communication of Recommendations: reviewed with nurse, reviewed with patient, and reviewed with family    Nutrition Assessment     Malnutrition Assessment/Nutrition-Focused Physical Exam    Malnutrition Context: chronic illness (03/27/24 1418)  Malnutrition Level: moderate (03/27/24 1418)  Energy Intake (Malnutrition): other (see comments) (Does not meet criteria) (03/27/24 1418)  Weight Loss (Malnutrition): other (see comments) (Does not meet criteria) (03/27/24 1418)  Subcutaneous Fat (Malnutrition): moderate depletion (03/27/24 1418)     Upper Arm Region (Subcutaneous Fat Loss): moderate depletion     Muscle Mass (Malnutrition): moderate depletion (03/27/24 1418)  [place] Region (Muscle Loss): moderate depletion  Clavicle Bone Region (Muscle Loss): moderate depletion  Clavicle and Acromion Bone Region (Muscle Loss): moderate depletion  Scapular Bone Region (Muscle Loss): moderate depletion              Fluid Accumulation (Malnutrition): other (see comments) (Does not meet criteria) (03/27/24 1418)        A minimum of two characteristics is recommended for diagnosis of either severe or non-severe malnutrition.    Chart Review    Reason Seen: length of stay    Malnutrition Screening Tool Results   Have you recently lost weight without trying?: No  Have you been eating poorly because of a decreased appetite?: No   MST Score: 0   Diagnosis:  CAD (Multivessel)- Status Post CABG (3.25.24) LIMA to LAD, SVG to OM, SVG to Distal RCA (HANK Ligation)    - LM: 90% Distal, LAD: 70% Ostial, LCX: 40-50% Proximal, RCA: (Dominant) 80-90% Serially Calcified Lesions (EF 50%)  (3.21.24)  Hypertension  PAD    - REIA 70-80% Stenosis at Pelvic Brim (3.21.24)  Nicotine Dependence/Chronic Tobacco Use  ROBBI    - 50-69% MUSTAPHA (CUS 3.21.24)  Anemia    Relevant Medical History:   Hypertension, Claudication, SOB, CP, Chronic Tobacco Use     Scheduled Medications:  amLODIPine, 5 mg, Daily  aspirin, 81 mg, Daily  atorvastatin, 40 mg, Daily  docusate sodium, 100 mg, BID  enoxparin, 40 mg, Daily  folic acid, 1 mg, Daily  LIDOcaine HCL 20 mg/ml (2%), 2 mL, Once  metoprolol succinate, 12.5 mg, Daily  mupirocin, , BID  pantoprazole, 40 mg, Daily  sucralfate, 1 g, QID (AC & HS)    Continuous Infusions:  dextrose 5 % and 0.45 % NaCl, Last Rate: 100 mL/hr at 03/26/24 2212  loperamide    PRN Medications: acetaminophen, albumin human 5%, aluminum-magnesium hydroxide-simethicone, calcium gluconate IVPB, calcium gluconate IVPB, calcium gluconate IVPB, diazePAM, diphenhydrAMINE, heparin, porcine (PF), hydrALAZINE, HYDROcodone-acetaminophen, lactulose 10 gram/15 ml, loperamide, magnesium sulfate IVPB, magnesium sulfate IVPB, metoclopramide, morphine, morphine, mupirocin, nitroGLYCERIN, ondansetron, ondansetron, oxyCODONE, potassium chloride in water, potassium chloride in water, potassium chloride in water, sodium chloride 0.9%, sodium phosphate 15 mmol in dextrose 5 % (D5W) 250 mL IVPB    Calorie Containing IV Medications: no significant kcals from medications at this time    Recent Labs   Lab 03/22/24  0331 03/23/24  0753 03/24/24  0434 03/25/24  0307 03/25/24  0712 03/25/24  0901 03/25/24  0907 03/25/24  0942 03/25/24  1009 03/25/24  1043 03/25/24  1221 03/25/24  1702 03/25/24  2234 03/26/24  0308 03/27/24  0148     --  140  --   --  138  --   --   --   --  139  --   --  135* 132*   K 4.2  --  4.2  --   --  4.3  --   --   --   --  3.8 3.5 4.4 4.3 4.1   CALCIUM 9.3  --  9.9  --   --  9.5  --   --   --   --  9.5  --   --  8.3* 8.6*   PHOS  --   --   --   --   --   --   --   --   --   --  4.4  --   --  3.0  --     MG 2.00  --  1.90  --   --   --   --   --   --   --  2.20  --   --  1.80 1.90   CHLORIDE 105  --  104  --   --  114*  --   --   --   --  112*  --   --  109* 105   CO2 24  --  26  --   --  19*  --   --   --   --  19*  --   --  19* 20*   BUN 15.6  --  16.2  --   --  16.0  --   --   --   --  14.6  --   --  14.1 12.6   CREATININE 0.86  --  0.87  --   --  0.67*  --   --   --   --  0.70*  --   --  0.74 0.67*   EGFRNORACEVR >60  --  >60  --   --  >60  --   --   --   --  >60  --   --  >60 >60   GLUCOSE 99  --  116*  --   --  150*  --   --   --   --  152*  --   --  140* 150*   BILITOT 0.9  --   --   --   --   --   --   --   --   --  1.9*  --   --  1.9* 1.3   ALKPHOS 77  --   --   --   --   --   --   --   --   --  61  --   --  48 59   ALT 10  --   --   --   --   --   --   --   --   --  11  --   --  21 23   AST 16  --   --   --   --   --   --   --   --   --  24  --   --  83* 85*   ALBUMIN 3.7  --   --   --   --   --   --   --   --   --  3.4  --   --  3.1* 3.0*   WBC 7.22 8.15 8.68 9.27  --  10.26  --   --   --   --  13.87*  --   --  10.01 12.80*  12.76*   HGB 12.6* 12.2* 12.7* 12.7*  --  11.1*  --   --   --   --  13.6*  13.6* 10.6*  --  11.4* 10.9*  11.0*   HCT 37.1* 36.2* 37.7* 37.7*   < > 33.5*   < > 23* 23* 21* 40.7*  40.7* 31.5*  --  33.5* 32.8*  31.8*    < > = values in this interval not displayed.     Nutrition Orders:  Diet Heart Healthy  Dietary nutrition supplements Boost Vanilla; All Meals    Appetite/Oral Intake: poor/25-50% of meals  Factors Affecting Nutritional Intake: decreased appetite  Food/Nondenominational/Cultural Preferences: none reported  Food Allergies: none reported  Last Bowel Movement: 03/23/24  Wound(s):  none noted    Comments    3/27/2024: Pt reports a good appetite/PO intake prior to admit. Pt reports a poor appetite/PO intake currently. Pt agreeable to Boost VHC TID. Pt denies N/V/D/C and chewing/swallowing difficulties. Pt reports UBW as 52.3 kg. Last BM noted. Encouraged adequate PO intake.  "Will monitor.    Anthropometrics    Height: 5' 4" (162.6 cm), Height Method: Stated  Last Weight: 53.3 kg (117 lb 8.1 oz) (24 0600), Weight Method: Standard Scale  BMI (Calculated): 20.2  BMI Classification: underweight (BMI less than 22 if >65 years of age)        Ideal Body Weight (IBW), Male: 130 lb     % Ideal Body Weight, Male (lb): 81.74 %                 Usual Body Weight (UBW), k.3 kg  % Usual Body Weight: 102.12     Usual Weight Provided By: patient    Wt Readings from Last 5 Encounters:   24 53.3 kg (117 lb 8.1 oz)   04/10/23 48.5 kg (107 lb)     Weight Change(s) Since Admission:   3/27/2024: 53.3 kg  Wt Readings from Last 1 Encounters:   24 0600 53.3 kg (117 lb 8.1 oz)   24 0527 52 kg (114 lb 10.2 oz)   24 0509 47.3 kg (104 lb 4.4 oz)   24 0549 51.4 kg (113 lb 6.4 oz)   24 0855 48.2 kg (106 lb 4.2 oz)   Admit Weight: 48.2 kg (106 lb 4.2 oz) (24 0855), Weight Method: Standard Scale    Estimated Needs    Weight Used For Calorie Calculations: 53.3 kg (117 lb 8.1 oz)  Energy Calorie Requirements (kcal): 6849-9921 (30-35 kcal/kg)  Energy Need Method: Kcal/kg  Weight Used For Protein Calculations: 53.3 kg (117 lb 8.1 oz)  Protein Requirements: 64-80 (1.2-1.5 g/kg)  Fluid Requirements (mL): 1599 (1 mL/kcal)    Enteral Nutrition     Patient not receiving enteral nutrition at this time.    Parenteral Nutrition     Patient not receiving parenteral nutrition support at this time.    Evaluation of Received Nutrient Intake    Calories: not meeting estimated needs  Protein: not meeting estimated needs    Patient Education     Not applicable.    Nutrition Diagnosis     PES: Inadequate oral intake related to acute illness as evidenced by poor PO intake >3 days. (new)     PES: Moderate chronic disease or condition related malnutrition related to chronic illness as evidenced by moderate fat depletion and moderate muscle depletion. (new)    Nutrition Interventions "     Intervention(s): general/healthful diet and commercial beverage    Goal: Meet greater than 80% of nutritional needs by follow-up. (new)  Goal: Consume % of meals/snacks by follow-up. (new)    Nutrition Goals & Monitoring     Dietitian will monitor: food and beverage intake, weight, electrolyte/renal panel, glucose/endocrine profile, and gastrointestinal profile    Nutrition Risk/Follow-Up: moderate (follow-up in 3-5 days)   Please consult if re-assessment needed sooner.

## 2024-03-27 NOTE — NURSING
Nurses Note -- 4 Eyes      3/26/2024   8:33 PM      Skin assessed during: Daily Assessment      [x] No Altered Skin Integrity Present    [x]Prevention Measures Documented      [] Yes- Altered Skin Integrity Present or Discovered   [] LDA Added if Not in Epic (Describe Wound)   [] New Altered Skin Integrity was Present on Admit and Documented in LDA   [] Wound Image Taken    Wound Care Consulted? No    Attending Nurse:  Wendy Forte RN/Staff Member:   Sundar RHODES

## 2024-03-27 NOTE — PROGRESS NOTES
03/27/24 1115   Pre Exercise Vitals   /74   Pulse 70  (NSR)   Supplemental O2? Yes   O2 Device nasal cannula   O2 Flow (L/min) 3   SpO2 100 %   During Exercise Vitals   Pulse 98  (NSR)   Supplemental O2? Yes   O2 Device nasal cannula   O2 Flow (L/min) 2   SpO2 99 %   Distance Walked 60 ft   Post Exercise Vitals   /69   Pulse 67  (NSR)   Supplemental O2? Yes   O2 Device nasal cannula   O2 Flow (L/min) 2   SpO2 97 %   Modality   Modality   (rollator)     Communicated with nurse prior and post  activity.  Min assist x 1 sit to stand, maintains sternal precautions. Encouraged increased activity and use of Incentive spirometry - performs to 500 ml - 750 ml x 3 times. Post discharge instructions done with pt, daughter & son. Questions answered. Adriano Gale obtained for chair .

## 2024-03-27 NOTE — PROGRESS NOTES
[facility] - 6th Floor [facility] Telemetry    Cardiology  Progress Note    Patient Name: Ata Pickens  MRN: [id number]  Admission Date: 3/21/2024  Hospital Length of Stay: 6 days  Code Status: Full Code   Attending Physician: Monse Lima MD   Primary Care Physician: Lorraine, Primary Doctor  Expected Discharge Date:   Principal Problem:<principal problem not specified>    Subjective:   Chief Complaint:  Outpatient Cath- MV CAD- CABG      HPI:   Mr. Pickens is a 68 year old male, known to Dr. Lima, who presented to the hospital and underwent elective coronary angiogram due to diagnosis of abnormal stress test which was performed in the outpatient setting due to reported history of chest pain, SOB, and fatigue with minimal exertion. He underwent cath on 3.21.24 revealing MV CAD including 90% distal LM/Ostial LAD Disease. Patient's EF on LV Gram noted to be 50%. Patient was admitted to [facility]. CT Surgical services consulted for CABG evaluation.    Hospital Course:  3.22.24: NAD Noted. SB on Tele. BP Stable. CP This AM, Went away on its own. No CP Currently. Right Radial Site soft.  3.23.24: NAD Noted. SR on Tele. BP Stable. On Heparin Infusion.  3.24.24: NAD Noted. Vitals Stable. SR on Tele.  3.25.24: NAD Noted. CABG Today. Tolerated well. SR on Tele. On Cleviprex Infusion. CO/CI 4.9/3.3.   3.26.24: NAD Noted. Progressing well. SR on Tele. Not requiring Pressors.  3.27.24: NAD Noted. BP Stable, some intermittent hypertension. SR on Tele. Progressing well.      PMH: Hypertension, Claudication, SOB, CP, Chronic Tobacco Use  PSH: Wrist Surgery, Colostomy Closure  Family History: Father- Lung Cancer, Mother- Lung Cancer, Brother- Cardiac Pacemaker  Social History: Tobacco- Active Smoker, Alcohol- Negative, Substance Abuse- Negative     Previous Cardiac Diagnostics:   CABG (3.25.24):  Procedure:  Coronary artery bypass grafting X 3 ,   Left internal mammary artery to the LAD   Saphenous venous  graft to   OM  Saphenous venous graft to    distal RCA  Ligation of left atrial appendage with a 35 mm atrial clip  Endoscopic venous harvesting left greater saphenous vein    Coronary Angiogram (3.21.24):  Coronary findings:  Dominance: right   Left main:  Calcified distal 90% stenosis extending ostium of the LAD.    Left anterior descending artery:  Type three-vessel with calcified ostial stenosis of at least 70%.    Circumflex artery:  Nondominant calcified vessel with proximal 40-50% stenosis.  The circumflex gives rise to a large patent bifurcating OM.  Right coronary artery:  Calcified dominant vessel with serial heavily calcified segment stenosis of up to 80-90%.  The RCA terminates in a moderate bifurcating PDA and a small PL segment.    Selective LIMA angiogram:  Large widely patent vessel  Aortic root angiogram:  Calcified Type 3 bovine arch with patent innominate and proximal ICA bilateral.  Abdominal angiogram: No AAA.  Left iliac system patent.  Left common femoral artery patent proximally.  Right common and internal iliac patent.  Right external iliac with 70-80% stenosis at the pelvic brim.  Right common femoral occluded.  Left ventriculography:  EF- 50%.    Hemodynamics:LV/AO= 0 mmHg                      LVEDP= 13-16 mmHg       Carotid US (3.21.24):  The right internal carotid artery demonstrated 50-69% stenosis.   The left internal carotid artery demonstrated less than 50% stenosis.  Bilateral vertebral arteries were patent with antegrade flow.     Echocardiogram (3.5.24):  The left ventricle is decreased in size. Global left ventricular systolic function is borderline normal. The left ventricular ejection fraction is 50%. The left ventricle diastolic function is impaired (Grade I) with normal left atrial pressure.   The right atrium is moderately enlarged ~5.1 cm.  Mild (1+) tricuspid and trace mitral regurgitation.  The pulmonary artery systolic pressure is 23 mmHg.  The study quality is below  average.      CV US Arterial Lower Extremities (3.5.24):  The study quality is good.   The right proximal superficial femoral artery is occluded.   The right mid and distal superficial femoral, posterior tibial, peroneal, anterior tibial, and dorsalis pedis arteries exhibit poor, diminished perfusion via collateral flow.  The right lower extremity arteries exhibit mono-phasic waveforms.  The left external iliac and common femoral arteries exhibit mono-phasic waveforms.  The remaining arteries of the left lower extremity exhibit bi-phasic waveforms.      PET (3.1.24):  This is an abnormal perfusion study. Study is consistent with significant anterior  ischemia.   This scan is suggestive of moderate to high risk for future cardiovascular events.   Large partially reversible perfusion abnormality of severe intensity in the anterior apical region. Large partially reversible perfusion abnormality of severe intensity in the anterior septal region. Small reversible perfusion abnormality of severe intensity in the apical lateral segment.   The left ventricular cavity is noted to be normal on the stress studies. The stress left ventricular ejection fraction was calculated to be 37% and left ventricular global function is moderately reduced. The rest left ventricular cavity is noted to be normal. The rest left ventricular ejection fraction was calculated to be 52% and rest left ventricular global function is normal.   Hypokinesis of apical anterior segment is noted only in the stress studies which is suggestive of new ischemia. Persistent hypokinesis of the septal region is noted in both rest and stress studies. When compared to the resting ejection fraction (52%), the stress ejection fraction (37%) has decreased.   Transient ischemic dilatation is present and has been described as a marker for high risk coronary artery disease. It has also been described in microvascular disease, hypertensive heart disease as well as cardiac  deconditioning.   The study quality is good.   There was no rise in myocardial blood flow between rest and stress.  Global myocardial blood flow reserve was 0.83.  Myocardial blood flow reserve is globally abnormal, placing the patient at a higher coronary event risk.    Review of Systems   Cardiovascular:  Negative for chest pain.   Respiratory:  Negative for shortness of breath.      Objective:     Vital Signs (Most Recent):  Temp: 97.6 °F (36.4 °C) (03/27/24 0909)  Pulse: 77 (03/27/24 0909)  Resp: 18 (03/27/24 1012)  BP: 125/75 (03/27/24 0909)  SpO2: 96 % (03/27/24 0909) Vital Signs (24h Range):  Temp:  [97.2 °F (36.2 °C)-98.8 °F (37.1 °C)] 97.6 °F (36.4 °C)  Pulse:  [71-89] 77  Resp:  [11-30] 18  SpO2:  [96 %-99 %] 96 %  BP: (107-144)/(60-85) 125/75   Weight: 53.3 kg (117 lb 8.1 oz)  Body mass index is 20.17 kg/m².  SpO2: 96 %       Intake/Output Summary (Last 24 hours) at 3/27/2024 1130  Last data filed at 3/27/2024 0800  Gross per 24 hour   Intake 1965.33 ml   Output 1285 ml   Net 680.33 ml       Lines/Drains/Airways       Drain  Duration                  Chest Tube Tube - 2 Left Pleural 28 Fr. -- days         Chest Tube 03/25/24 Tube - 3 Right Pleural 19 Fr. 2 days         Urethral Catheter 03/25/24  18 Fr. 2 days              Line  Duration                  Pacer Wires 03/25/24 1900 1 day              Peripheral Intravenous Line  Duration                  Peripheral IV - Single Lumen 03/22/24 2100 20 G Left Forearm 4 days         Peripheral IV - Single Lumen 03/27/24 0825 18 G Anterior;Left Upper Arm <1 day                  Significant Labs:   Recent Results (from the past 72 hour(s))   EKG 12-lead    Collection Time: 03/24/24  1:08 PM   Result Value Ref Range    QRS Duration 92 ms    [facility]HS QTC Calculation 438 ms   APTT    Collection Time: 03/25/24  3:07 AM   Result Value Ref Range    PTT 66.5 (H) 23.2 - 33.7 seconds   CBC with Differential    Collection Time: 03/25/24  3:07 AM   Result Value Ref Range    WBC  9.27 4.50 - 11.50 x10(3)/mcL    RBC 4.05 (L) 4.70 - 6.10 x10(6)/mcL    Hgb 12.7 (L) 14.0 - 18.0 g/dL    Hct 37.7 (L) 42.0 - 52.0 %    MCV 93.1 80.0 - 94.0 fL    MCH 31.4 (H) 27.0 - 31.0 pg    MCHC 33.7 33.0 - 36.0 g/dL    RDW 13.1 11.5 - 17.0 %    Platelet 254 130 - 400 x10(3)/mcL    MPV 10.2 7.4 - 10.4 fL    Neut % 62.2 %    Lymph % 24.4 %    Mono % 9.4 %    Eos % 2.7 %    Basophil % 1.0 %    Lymph # 2.26 0.6 - 4.6 x10(3)/mcL    Neut # 5.77 2.1 - 9.2 x10(3)/mcL    Mono # 0.87 0.1 - 1.3 x10(3)/mcL    Eos # 0.25 0 - 0.9 x10(3)/mcL    Baso # 0.09 <=0.2 x10(3)/mcL    IG# 0.03 0 - 0.04 x10(3)/mcL    IG% 0.3 %    NRBC% 0.0 %   POCT glucose    Collection Time: 03/25/24  4:15 AM   Result Value Ref Range    POCT Glucose 101 70 - 110 mg/dL   RT Blood Gas    Collection Time: 03/25/24  6:39 AM   Result Value Ref Range    Sample Type Arterial Blood     Sample site Arterial Line     Drawn by VR/holding     pH, Blood gas 7.420 7.350 - 7.450    pCO2, Blood gas 46.0 (H) 35.0 - 45.0 mmHg    pO2, Blood gas 116.0 (H) 80.0 - 100.0 mmHg    Sodium, Blood Gas 133 (L) 137 - 145 mmol/L    Potassium, Blood Gas 4.0 3.5 - 5.0 mmol/L    Calcium Level Ionized 1.20 1.12 - 1.23 mmol/L    TOC2, Blood gas 31.2 mmol/L    Base Excess, Blood gas 4.60 (H) -2.00 - 2.00 mmol/L    sO2, Blood gas 98.6 %    HCO3, Blood gas 29.8 (H) 22.0 - 26.0 mmol/L    THb, Blood gas 11.9 (L) 12 - 16 g/dL    O2 Hb, Blood Gas 97.2 (H) 94.0 - 97.0 %    CO Hgb 1.6 (H) 0.5 - 1.5 %    Met Hgb 0.9 0.4 - 1.5 %    Allens Test N/A     Oxygen Device, Blood gas Cannula     LPM 2    ISTAT PROCEDURE    Collection Time: 03/25/24  7:12 AM   Result Value Ref Range    POC PH 7.364 7.35 - 7.45    POC PCO2 47.1 (H) 35 - 45 mmHg    POC PO2 35 (LL) 40 - 60 mmHg    POC HCO3 26.8 24 - 28 mmol/L    POC BE 1 -2 to 2 mmol/L    POC SATURATED O2 65 95 - 100 %    POC pH Temp 7.378     POC pCO2 Temp 45.1 mmHg    POC pO2 Temp 33 mmHg    POC Glucose 96 70 - 110 mg/dL    POC Sodium 137 136 - 145 mmol/L     POC Potassium 3.7 3.5 - 5.1 mmol/L    POC TCO2 28 24 - 29 mmol/L    POC Ionized Calcium 1.21 1.06 - 1.42 mmol/L    POC Hematocrit 31 (L) 36 - 54 %PCV    POC HEMOGLOBIN 11 g/dL    Sample VENOUS     FiO2 100     POC Temp 36.0 C    Basic Metabolic Panel    Collection Time: 03/25/24  9:01 AM   Result Value Ref Range    Sodium Level 138 136 - 145 mmol/L    Potassium Level 4.3 3.5 - 5.1 mmol/L    Chloride 114 (H) 98 - 107 mmol/L    Carbon Dioxide 19 (L) 23 - 31 mmol/L    Glucose Level 150 (H) 82 - 115 mg/dL    Blood Urea Nitrogen 16.0 8.4 - 25.7 mg/dL    Creatinine 0.67 (L) 0.73 - 1.18 mg/dL    BUN/Creatinine Ratio 24     Calcium Level Total 9.5 8.8 - 10.0 mg/dL    Anion Gap 5.0 mEq/L    eGFR >60 mls/min/1.73/m2   Protime-INR    Collection Time: 03/25/24  9:01 AM   Result Value Ref Range    PT 18.0 (H) 12.5 - 14.5 seconds    INR 1.5 (H) <=1.3   APTT    Collection Time: 03/25/24  9:01 AM   Result Value Ref Range    PTT 30.3 23.2 - 33.7 seconds   CBC with Differential    Collection Time: 03/25/24  9:01 AM   Result Value Ref Range    WBC 10.26 4.50 - 11.50 x10(3)/mcL    RBC 3.52 (L) 4.70 - 6.10 x10(6)/mcL    Hgb 11.1 (L) 14.0 - 18.0 g/dL    Hct 33.5 (L) 42.0 - 52.0 %    MCV 95.2 (H) 80.0 - 94.0 fL    MCH 31.5 (H) 27.0 - 31.0 pg    MCHC 33.1 33.0 - 36.0 g/dL    RDW 13.3 11.5 - 17.0 %    Platelet 108 (L) 130 - 400 x10(3)/mcL    MPV 10.3 7.4 - 10.4 fL    Neut % 67.5 %    Lymph % 26.3 %    Mono % 3.4 %    Eos % 1.3 %    Basophil % 0.6 %    Lymph # 2.70 0.6 - 4.6 x10(3)/mcL    Neut # 6.93 2.1 - 9.2 x10(3)/mcL    Mono # 0.35 0.1 - 1.3 x10(3)/mcL    Eos # 0.13 0 - 0.9 x10(3)/mcL    Baso # 0.06 <=0.2 x10(3)/mcL    IG# 0.09 (H) 0 - 0.04 x10(3)/mcL    IG% 0.9 %    NRBC% 0.0 %    IPF 4.7 0.9 - 11.2 %   ISTAT PROCEDURE    Collection Time: 03/25/24  9:07 AM   Result Value Ref Range    POC PH 7.396 7.35 - 7.45    POC PCO2 41.9 35 - 45 mmHg    POC PO2 46 40 - 60 mmHg    POC HCO3 25.7 24 - 28 mmol/L    POC BE 1 -2 to 2 mmol/L    POC  SATURATED O2 81 95 - 100 %    POC pH Temp 7.470     POC pCO2 Temp 33.7 mmHg    POC pO2 Temp 32 mmHg    POC Glucose 130 (H) 70 - 110 mg/dL    POC Sodium 135 (L) 136 - 145 mmol/L    POC Potassium 5.7 (H) 3.5 - 5.1 mmol/L    POC TCO2 27 24 - 29 mmol/L    POC Ionized Calcium 0.96 (L) 1.06 - 1.42 mmol/L    POC Hematocrit 22 (L) 36 - 54 %PCV    POC HEMOGLOBIN 8 g/dL    Sample VENOUS     FiO2 60    ISTAT PROCEDURE    Collection Time: 03/25/24  9:14 AM   Result Value Ref Range    POC PH 7.439 7.35 - 7.45    POC PCO2 37.4 35 - 45 mmHg    POC PO2 359 (H) 80 - 100 mmHg    POC HCO3 25.3 24 - 28 mmol/L    POC BE 1 -2 to 2 mmol/L    POC SATURATED O2 100 95 - 100 %    POC pH Temp 7.525     POC pCO2 Temp 29.0 mmHg    POC pO2 Temp 329 mmHg    POC Glucose 156 (H) 70 - 110 mg/dL    POC Sodium 135 (L) 136 - 145 mmol/L    POC Potassium 5.1 3.5 - 5.1 mmol/L    POC TCO2 26 23 - 27 mmol/L    POC Ionized Calcium 0.98 (L) 1.06 - 1.42 mmol/L    POC Hematocrit 22 (L) 36 - 54 %PCV    POC HEMOGLOBIN 8 g/dL    Sample ARTERIAL     FiO2 60     POC Temp 31.2 C    ISTAT PROCEDURE    Collection Time: 03/25/24  9:42 AM   Result Value Ref Range    POC PH 7.421 7.35 - 7.45    POC PCO2 39.3 35 - 45 mmHg    POC PO2 322 (H) 80 - 100 mmHg    POC HCO3 25.5 24 - 28 mmol/L    POC BE 1 -2 to 2 mmol/L    POC SATURATED O2 100 95 - 100 %    POC pH Temp 7.422     POC pCO2 Temp 39.1 mmHg    POC pO2 Temp 321 mmHg    POC Glucose 165 (H) 70 - 110 mg/dL    POC Sodium 136 136 - 145 mmol/L    POC Potassium 4.8 3.5 - 5.1 mmol/L    POC TCO2 27 23 - 27 mmol/L    POC Ionized Calcium 1.03 (L) 1.06 - 1.42 mmol/L    POC Hematocrit 23 (L) 36 - 54 %PCV    POC HEMOGLOBIN 8 g/dL    Sample ARTERIAL     FiO2 70     POC Temp 36.9 C    ISTAT PROCEDURE    Collection Time: 03/25/24 10:09 AM   Result Value Ref Range    POC PH 7.368 7.35 - 7.45    POC PCO2 44.3 35 - 45 mmHg    POC PO2 324 (H) 80 - 100 mmHg    POC HCO3 25.5 24 - 28 mmol/L    POC BE 0 -2 to 2 mmol/L    POC SATURATED O2 100 95  - 100 %    POC pH Temp 7.370     POC pCO2 Temp 44.1 mmHg    POC pO2 Temp 324 mmHg    POC Glucose 154 (H) 70 - 110 mg/dL    POC Sodium 136 136 - 145 mmol/L    POC Potassium 4.4 3.5 - 5.1 mmol/L    POC TCO2 27 23 - 27 mmol/L    POC Ionized Calcium 1.29 1.06 - 1.42 mmol/L    POC Hematocrit 23 (L) 36 - 54 %PCV    POC HEMOGLOBIN 8 g/dL    Sample ARTERIAL     FiO2 70     POC Temp 36.9 C    ISTAT PROCEDURE    Collection Time: 03/25/24 10:43 AM   Result Value Ref Range    POC PH 7.319 (L) 7.35 - 7.45    POC PCO2 43.9 35 - 45 mmHg    POC PO2 340 (H) 80 - 100 mmHg    POC HCO3 22.6 (L) 24 - 28 mmol/L    POC BE -4 (L) -2 to 2 mmol/L    POC SATURATED O2 100 95 - 100 %    POC Glucose 137 (H) 70 - 110 mg/dL    POC Sodium 138 136 - 145 mmol/L    POC Potassium 3.6 3.5 - 5.1 mmol/L    POC TCO2 24 23 - 27 mmol/L    POC Ionized Calcium 1.73 (H) 1.06 - 1.42 mmol/L    POC Hematocrit 21 (L) 36 - 54 %PCV    POC HEMOGLOBIN 7 g/dL    Sample ARTERIAL    POCT glucose    Collection Time: 03/25/24 11:42 AM   Result Value Ref Range    POCT Glucose 116 (H) 70 - 110 mg/dL   Magnesium    Collection Time: 03/25/24 12:21 PM   Result Value Ref Range    Magnesium Level 2.20 1.60 - 2.60 mg/dL   Phosphorus    Collection Time: 03/25/24 12:21 PM   Result Value Ref Range    Phosphorus Level 4.4 2.3 - 4.7 mg/dL   CBC Without Differential    Collection Time: 03/25/24 12:21 PM   Result Value Ref Range    WBC 13.87 (H) 4.50 - 11.50 x10(3)/mcL    RBC 4.39 (L) 4.70 - 6.10 x10(6)/mcL    Hgb 13.6 (L) 14.0 - 18.0 g/dL    Hct 40.7 (L) 42.0 - 52.0 %    MCV 92.7 80.0 - 94.0 fL    MCH 31.0 27.0 - 31.0 pg    MCHC 33.4 33.0 - 36.0 g/dL    RDW 13.4 11.5 - 17.0 %    Platelet 145 130 - 400 x10(3)/mcL    MPV 10.2 7.4 - 10.4 fL    IPF 6.2 0.9 - 11.2 %    NRBC% 0.0 %   Comprehensive Metabolic Panel    Collection Time: 03/25/24 12:21 PM   Result Value Ref Range    Sodium Level 139 136 - 145 mmol/L    Potassium Level 3.8 3.5 - 5.1 mmol/L    Chloride 112 (H) 98 - 107 mmol/L     Carbon Dioxide 19 (L) 23 - 31 mmol/L    Glucose Level 152 (H) 82 - 115 mg/dL    Blood Urea Nitrogen 14.6 8.4 - 25.7 mg/dL    Creatinine 0.70 (L) 0.73 - 1.18 mg/dL    Calcium Level Total 9.5 8.8 - 10.0 mg/dL    Protein Total 5.7 (L) 5.8 - 7.6 gm/dL    Albumin Level 3.4 3.4 - 4.8 g/dL    Globulin 2.3 (L) 2.4 - 3.5 gm/dL    Albumin/Globulin Ratio 1.5 1.1 - 2.0 ratio    Bilirubin Total 1.9 (H) <=1.5 mg/dL    Alkaline Phosphatase 61 40 - 150 unit/L    Alanine Aminotransferase 11 0 - 55 unit/L    Aspartate Aminotransferase 24 5 - 34 unit/L    eGFR >60 mls/min/1.73/m2   Hemoglobin and Hematocrit    Collection Time: 03/25/24 12:21 PM   Result Value Ref Range    Hgb 13.6 (L) 14.0 - 18.0 g/dL    Hct 40.7 (L) 42.0 - 52.0 %   Protime-INR    Collection Time: 03/25/24 12:21 PM   Result Value Ref Range    PT 15.7 (H) 12.5 - 14.5 seconds    INR 1.3 <=1.3   APTT    Collection Time: 03/25/24 12:21 PM   Result Value Ref Range    PTT 30.0 23.2 - 33.7 seconds   POCT glucose    Collection Time: 03/25/24  1:00 PM   Result Value Ref Range    POCT Glucose 169 (H) 70 - 110 mg/dL   RT Blood Gas    Collection Time: 03/25/24  1:02 PM   Result Value Ref Range    Sample Type Arterial Blood     Sample site Arterial Line     Drawn by LF,RRT     pH, Blood gas 7.250 (LL) 7.350 - 7.450    pCO2, Blood gas 51.0 (HH) 35.0 - 45.0 mmHg    pO2, Blood gas 86.0 80.0 - 100.0 mmHg    Sodium, Blood Gas 136 (L) 137 - 145 mmol/L    Potassium, Blood Gas 3.8 3.5 - 5.0 mmol/L    Calcium Level Ionized 1.35 (H) 1.12 - 1.23 mmol/L    TOC2, Blood gas 24.0 mmol/L    Base Excess, Blood gas -5.10 mmol/L    sO2, Blood gas 95.0 %    HCO3, Blood gas 22.4 22.0 - 26.0 mmol/L    Allens Test N/A     MODE SIMV     Oxygen Device, Blood gas Ventilator     FIO2, Blood gas 60 %    Mech Vt 500 ml    Mech RR 20 b/min    PEEP 5.0 cmH2O    PS 10.0 cmH2O    Itime (sec) 1 sec   Transesophageal echo (MANI)    Collection Time: 03/25/24  1:27 PM   Result Value Ref Range    BSA 1.48 m2   RT Blood  Gas    Collection Time: 03/25/24  2:05 PM   Result Value Ref Range    Sample Type Arterial Blood     Sample site Arterial Line     Drawn by LF,RRT     pH, Blood gas 7.320 (L) 7.350 - 7.450    pCO2, Blood gas 44.0 35.0 - 45.0 mmHg    pO2, Blood gas 61.0 (L) 80.0 - 100.0 mmHg    Sodium, Blood Gas 135 (L) 137 - 145 mmol/L    Potassium, Blood Gas 3.4 (L) 3.5 - 5.0 mmol/L    Calcium Level Ionized 1.27 (H) 1.12 - 1.23 mmol/L    TOC2, Blood gas 24.1 mmol/L    Base Excess, Blood gas -3.40 mmol/L    sO2, Blood gas 89.0 %    HCO3, Blood gas 22.7 22.0 - 26.0 mmol/L    Allens Test N/A     MODE SIMV     Oxygen Device, Blood gas Ventilator     FIO2, Blood gas 30 %    Mech Vt 500 ml    Mech RR 20 b/min    PEEP 5.0 cmH2O    PS 10.0 cmH2O    Itime (sec) 1 sec   POCT glucose    Collection Time: 03/25/24  2:06 PM   Result Value Ref Range    POCT Glucose 153 (H) 70 - 110 mg/dL   POCT glucose    Collection Time: 03/25/24  3:07 PM   Result Value Ref Range    POCT Glucose 94 70 - 110 mg/dL   POCT glucose    Collection Time: 03/25/24  4:18 PM   Result Value Ref Range    POCT Glucose 60 (L) 70 - 110 mg/dL   RT Blood Gas    Collection Time: 03/25/24  4:30 PM   Result Value Ref Range    Sample Type Arterial Blood     Sample site Arterial Line     Drawn by LF,RRT     pH, Blood gas 7.370 7.350 - 7.450    pCO2, Blood gas 41.0 35.0 - 45.0 mmHg    pO2, Blood gas 91.0 80.0 - 100.0 mmHg    Sodium, Blood Gas 136 (L) 137 - 145 mmol/L    Potassium, Blood Gas 3.4 (L) 3.5 - 5.0 mmol/L    Calcium Level Ionized 1.14 1.12 - 1.23 mmol/L    TOC2, Blood gas 25.0 mmol/L    Base Excess, Blood gas -1.50 mmol/L    sO2, Blood gas 97.0 %    HCO3, Blood gas 23.7 22.0 - 26.0 mmol/L    Allens Test N/A     MODE CPAP     Oxygen Device, Blood gas Ventilator     FIO2, Blood gas 40 %    PEEP 5.0 cmH2O    PS 10.0 cmH2O   POCT glucose    Collection Time: 03/25/24  4:53 PM   Result Value Ref Range    POCT Glucose 69 (L) 70 - 110 mg/dL   Potassium    Collection Time: 03/25/24   5:02 PM   Result Value Ref Range    Potassium Level 3.5 3.5 - 5.1 mmol/L   Hemoglobin and Hematocrit    Collection Time: 03/25/24  5:02 PM   Result Value Ref Range    Hgb 10.6 (L) 14.0 - 18.0 g/dL    Hct 31.5 (L) 42.0 - 52.0 %   POCT glucose    Collection Time: 03/25/24  6:03 PM   Result Value Ref Range    POCT Glucose 87 70 - 110 mg/dL   POCT glucose    Collection Time: 03/25/24  9:15 PM   Result Value Ref Range    POCT Glucose 101 70 - 110 mg/dL   Potassium    Collection Time: 03/25/24 10:34 PM   Result Value Ref Range    Potassium Level 4.4 3.5 - 5.1 mmol/L   POCT glucose    Collection Time: 03/25/24 11:55 PM   Result Value Ref Range    POCT Glucose 122 (H) 70 - 110 mg/dL   POCT glucose    Collection Time: 03/26/24  3:07 AM   Result Value Ref Range    POCT Glucose 124 (H) 70 - 110 mg/dL   Comprehensive Metabolic Panel    Collection Time: 03/26/24  3:08 AM   Result Value Ref Range    Sodium Level 135 (L) 136 - 145 mmol/L    Potassium Level 4.3 3.5 - 5.1 mmol/L    Chloride 109 (H) 98 - 107 mmol/L    Carbon Dioxide 19 (L) 23 - 31 mmol/L    Glucose Level 140 (H) 82 - 115 mg/dL    Blood Urea Nitrogen 14.1 8.4 - 25.7 mg/dL    Creatinine 0.74 0.73 - 1.18 mg/dL    Calcium Level Total 8.3 (L) 8.8 - 10.0 mg/dL    Protein Total 5.0 (L) 5.8 - 7.6 gm/dL    Albumin Level 3.1 (L) 3.4 - 4.8 g/dL    Globulin 1.9 (L) 2.4 - 3.5 gm/dL    Albumin/Globulin Ratio 1.6 1.1 - 2.0 ratio    Bilirubin Total 1.9 (H) <=1.5 mg/dL    Alkaline Phosphatase 48 40 - 150 unit/L    Alanine Aminotransferase 21 0 - 55 unit/L    Aspartate Aminotransferase 83 (H) 5 - 34 unit/L    eGFR >60 mls/min/1.73/m2   CBC Without Differential    Collection Time: 03/26/24  3:08 AM   Result Value Ref Range    WBC 10.01 4.50 - 11.50 x10(3)/mcL    RBC 3.65 (L) 4.70 - 6.10 x10(6)/mcL    Hgb 11.4 (L) 14.0 - 18.0 g/dL    Hct 33.5 (L) 42.0 - 52.0 %    MCV 91.8 80.0 - 94.0 fL    MCH 31.2 (H) 27.0 - 31.0 pg    MCHC 34.0 33.0 - 36.0 g/dL    RDW 14.6 11.5 - 17.0 %    Platelet  142 130 - 400 x10(3)/mcL    MPV 10.9 (H) 7.4 - 10.4 fL    IPF 6.7 0.9 - 11.2 %    NRBC% 0.0 %   Phosphorus    Collection Time: 03/26/24  3:08 AM   Result Value Ref Range    Phosphorus Level 3.0 2.3 - 4.7 mg/dL   Magnesium    Collection Time: 03/26/24  3:08 AM   Result Value Ref Range    Magnesium Level 1.80 1.60 - 2.60 mg/dL   APTT    Collection Time: 03/26/24  3:08 AM   Result Value Ref Range    PTT 33.0 23.2 - 33.7 seconds   POCT glucose    Collection Time: 03/26/24  8:20 AM   Result Value Ref Range    POCT Glucose 124 (H) 70 - 110 mg/dL   POCT glucose    Collection Time: 03/26/24 11:46 AM   Result Value Ref Range    POCT Glucose 135 (H) 70 - 110 mg/dL   POCT glucose    Collection Time: 03/26/24  4:20 PM   Result Value Ref Range    POCT Glucose 139 (H) 70 - 110 mg/dL   Magnesium    Collection Time: 03/27/24  1:48 AM   Result Value Ref Range    Magnesium Level 1.90 1.60 - 2.60 mg/dL   Comprehensive Metabolic Panel    Collection Time: 03/27/24  1:48 AM   Result Value Ref Range    Sodium Level 132 (L) 136 - 145 mmol/L    Potassium Level 4.1 3.5 - 5.1 mmol/L    Chloride 105 98 - 107 mmol/L    Carbon Dioxide 20 (L) 23 - 31 mmol/L    Glucose Level 150 (H) 82 - 115 mg/dL    Blood Urea Nitrogen 12.6 8.4 - 25.7 mg/dL    Creatinine 0.67 (L) 0.73 - 1.18 mg/dL    Calcium Level Total 8.6 (L) 8.8 - 10.0 mg/dL    Protein Total 5.5 (L) 5.8 - 7.6 gm/dL    Albumin Level 3.0 (L) 3.4 - 4.8 g/dL    Globulin 2.5 2.4 - 3.5 gm/dL    Albumin/Globulin Ratio 1.2 1.1 - 2.0 ratio    Bilirubin Total 1.3 <=1.5 mg/dL    Alkaline Phosphatase 59 40 - 150 unit/L    Alanine Aminotransferase 23 0 - 55 unit/L    Aspartate Aminotransferase 85 (H) 5 - 34 unit/L    eGFR >60 mls/min/1.73/m2   CBC with Differential    Collection Time: 03/27/24  1:48 AM   Result Value Ref Range    WBC 12.80 (H) 4.50 - 11.50 x10(3)/mcL    RBC 3.55 (L) 4.70 - 6.10 x10(6)/mcL    Hgb 10.9 (L) 14.0 - 18.0 g/dL    Hct 32.8 (L) 42.0 - 52.0 %    MCV 92.4 80.0 - 94.0 fL    MCH 30.7  27.0 - 31.0 pg    MCHC 33.2 33.0 - 36.0 g/dL    RDW 14.3 11.5 - 17.0 %    Platelet 125 (L) 130 - 400 x10(3)/mcL    MPV 11.3 (H) 7.4 - 10.4 fL    Neut % 83.4 %    Lymph % 6.8 %    Mono % 8.4 %    Eos % 0.2 %    Basophil % 0.5 %    Lymph # 0.87 0.6 - 4.6 x10(3)/mcL    Neut # 10.65 (H) 2.1 - 9.2 x10(3)/mcL    Mono # 1.07 0.1 - 1.3 x10(3)/mcL    Eos # 0.02 0 - 0.9 x10(3)/mcL    Baso # 0.06 <=0.2 x10(3)/mcL    IG# 0.09 (H) 0 - 0.04 x10(3)/mcL    IG% 0.7 %    NRBC% 0.0 %   CBC Without Differential    Collection Time: 03/27/24  1:48 AM   Result Value Ref Range    WBC 12.76 (H) 4.50 - 11.50 x10(3)/mcL    RBC 3.50 (L) 4.70 - 6.10 x10(6)/mcL    Hgb 11.0 (L) 14.0 - 18.0 g/dL    Hct 31.8 (L) 42.0 - 52.0 %    MCV 90.9 80.0 - 94.0 fL    MCH 31.4 (H) 27.0 - 31.0 pg    MCHC 34.6 33.0 - 36.0 g/dL    RDW 14.3 11.5 - 17.0 %    Platelet 125 (L) 130 - 400 x10(3)/mcL    MPV 11.4 (H) 7.4 - 10.4 fL    IPF 6.8 0.9 - 11.2 %    NRBC% 0.0 %     Telemetry:  Sinus Rhythm     Physical Exam  Vitals and nursing note reviewed.   Constitutional:       Appearance: Normal appearance.   HENT:      Head: Normocephalic.      Mouth/Throat:      Mouth: Mucous membranes are moist.      Pharynx: Oropharynx is clear.   Neck:      Comments: Right IJ Venous Cath  Cardiovascular:      Rate and Rhythm: Normal rate and regular rhythm.      Pulses: Normal pulses.      Heart sounds: Normal heart sounds.   Pulmonary:      Effort: Pulmonary effort is normal. No respiratory distress.      Breath sounds: Normal breath sounds.   Abdominal:      General: There is no distension.      Palpations: Abdomen is soft.      Tenderness: There is no abdominal tenderness. There is no guarding.   Musculoskeletal:         General: Normal range of motion.      Cervical back: Neck supple.      Right lower leg: No edema.      Left lower leg: No edema.      Comments: Chest Tubes in Place    Skin:     General: Skin is warm and dry.      Comments: Mid Sternal Incision with Dressing in Place.     Neurological:      General: No focal deficit present.      Mental Status: He is alert and oriented to person, place, and time. Mental status is at baseline.      Motor: No weakness.   Psychiatric:         Mood and Affect: Mood normal.         Behavior: Behavior normal.       Current Inpatient Medications:    Current Facility-Administered Medications:     acetaminophen oral solution 650 mg, 650 mg, Per OG tube, Q6H PRN, Swetha, Yoel P, PA, 650 mg at 03/25/24 1622    albumin human 5% bottle 12.5 g, 12.5 g, Intravenous, PRN, Yoel Posada P, PA, Stopped at 03/25/24 1704    aspirin EC tablet 81 mg, 81 mg, Oral, Daily, Yoel Posada P, PA, 81 mg at 03/27/24 0807    atorvastatin tablet 40 mg, 40 mg, Oral, Daily, Brock Young, FNP, 40 mg at 03/27/24 0807    calcium gluconate 1 g in NS IVPB (premixed), 1 g, Intravenous, PRN, Yoel Posada P, PA    calcium gluconate 1 g in NS IVPB (premixed), 2 g, Intravenous, PRN, Swetha, Yoel P, PA    calcium gluconate 1 g in NS IVPB (premixed), 3 g, Intravenous, PRN, Swetha Yoel P, PA    dextrose 5 % and 0.45 % NaCl infusion, , Intravenous, Continuous, Yoel Posada P, PA, Last Rate: 100 mL/hr at 03/26/24 2212, New Bag at 03/26/24 2212    diazePAM tablet 10 mg, 10 mg, Oral, On Call Procedure, Monse Lima MD, 10 mg at 03/21/24 1021    diphenhydrAMINE capsule 50 mg, 50 mg, Oral, On Call Procedure, Monse Lima MD, 50 mg at 03/21/24 1021    docusate sodium capsule 100 mg, 100 mg, Oral, BID, Yoel Posada P, PA, 100 mg at 03/27/24 0807    enoxaparin injection 40 mg, 40 mg, Subcutaneous, Daily, Yoel Posada P, PA, 40 mg at 03/26/24 1747    folic acid tablet 1 mg, 1 mg, Oral, Daily, Yoel Posada, PA, 1 mg at 03/27/24 0807    furosemide injection 40 mg, 40 mg, Intravenous, Once, Karla Joseph FNP    heparin, porcine (PF) 100 unit/mL injection flush 500 Units, 5 mL, Intravenous, On Call Procedure, Reji Sullivan MD    hydrALAZINE  injection 10 mg, 10 mg, Intravenous, Q4H PRN, Chetna Rivera FNP, 10 mg at 03/22/24 1903    HYDROcodone-acetaminophen 5-325 mg per tablet 1 tablet, 1 tablet, Oral, Q4H PRN, Yoel Posada PA    lactulose 10 gram/15 ml solution 20 g, 20 g, Oral, Q6H PRN, Yoel Posada, PA    LIDOcaine HCL 20 mg/ml (2%) injection 2 mL, 2 mL, Intradermal, Once, Reji Sullivan MD    loperamide capsule 2 mg, 2 mg, Oral, Continuous PRN, Yoel Posada, PA    magnesium sulfate 2g in water 50mL IVPB (premix), 2 g, Intravenous, PRN, Yoel Posada PA, Stopped at 03/26/24 0644    magnesium sulfate 2g in water 50mL IVPB (premix), 4 g, Intravenous, PRN, Yoel Posada PA    metoclopramide injection 5 mg, 5 mg, Intravenous, Q6H PRN, Yoel Posada PA    morphine injection 2 mg, 2 mg, Intravenous, Q4H PRN, Karla Joseph FNP, 2 mg at 03/25/24 2020    morphine injection 4 mg, 4 mg, Intravenous, Q4H PRN, Yoel Posada, PA    mupirocin 2 % ointment, , Nasal, On Call Procedure, Reji Sullivan MD    mupirocin 2 % ointment, , Nasal, BID, Yoel Posada PA, Given at 03/26/24 2100    nitroGLYCERIN SL tablet 0.4 mg, 0.4 mg, Sublingual, Q5 Min PRN, Chetna Rivera FNP, 0.4 mg at 03/22/24 1900    ondansetron injection 4 mg, 4 mg, Intravenous, Q12H PRN, Brock Young FNP    ondansetron injection 4 mg, 4 mg, Intravenous, Q4H PRN, Yoel Posada PA, 4 mg at 03/27/24 0224    oxyCODONE immediate release tablet Tab 10 mg, 10 mg, Oral, Q4H PRN, Yoel Posada PA, 10 mg at 03/27/24 1012    pantoprazole EC tablet 40 mg, 40 mg, Oral, Daily, Chetna Rivera, FNP, 40 mg at 03/27/24 0807    potassium chloride 20 mEq in 100 mL IVPB (FOR CENTRAL LINE ADMINISTRATION ONLY), 20 mEq, Intravenous, PRN, Yoel Posada PA, Last Rate: 50 mL/hr at 03/25/24 1817, 20 mEq at 03/25/24 1817    potassium chloride 20 mEq in 100 mL IVPB (FOR CENTRAL LINE ADMINISTRATION ONLY), 40 mEq, Intravenous, PRN, Yoel Posada,  PA    potassium chloride 20 mEq in 100 mL IVPB (FOR CENTRAL LINE ADMINISTRATION ONLY), 20 mEq, Intravenous, PRN, Yoel Posada, PA    sodium chloride 0.9% flush 10 mL, 10 mL, Intravenous, PRN, Monse Lima MD    sodium phosphate 15 mmol in dextrose 5 % (D5W) 250 mL IVPB, 15 mmol, Intravenous, PRN, Yoel Posada P, PA    sucralfate tablet 1 g, 1 g, Oral, QID (AC & HS), Yoel Posada, PA, 1 g at 03/27/24 0613  VTE Risk Mitigation (From admission, onward)           Ordered     enoxaparin injection 40 mg  Daily         03/25/24 1134     Place IVANA hose  Until discontinued         03/25/24 1136     heparin, porcine (PF) 100 unit/mL injection flush 500 Units  On Call Procedure         03/24/24 6739                  Assessment/Plan:   CAD (Multivessel)- Status Post CABG (3.25.24) LIMA to LAD, SVG to OM, SVG to Distal RCA (HANK Ligation)    - LM: 90% Distal, LAD: 70% Ostial, LCX: 40-50% Proximal, RCA: (Dominant) 80-90% Serially Calcified Lesions (EF 50%) (3.21.24)  Hypertension  PAD    - REIA 70-80% Stenosis at Pelvic Brim (3.21.24)  Nicotine Dependence/Chronic Tobacco Use  ROBBI    - 50-69% MUSTAPHA (CUS 3.21.24)  Anemia    Impression:  Continue Aspirin 81 Mg Daily & Continue Statin   Resume Toprol XL 12.5 Mg Daily (with Hold Parameters) & Norvasc 5 Mg Daily   Advance Mobilization as Able and Continue Regular IS Usage  Routine Labs in AM    BOUBACAR Leigh  Cardiology  [facility] - 6th Floor Medical Telemetry  03/27/2024         Physician addendum:  I have seen and examined this patient as a split-shared visit with the MATHIEU d/t complicated medical management of above problems written in assessment and high acuity requiring physician expertise in medical decision-making. I performed the substantive portion of the history and exam. Above medical decision-making is also formulated by me.    Cardiovascular exam:  S1, S2  Lungs:  fine crackles at bases.  Extremities:  1+ edema bilaterally    Plan  medications as above.  Resume Toprol XL and Norvasc.:          Ludwin Schneider MD  Cardiologist

## 2024-03-27 NOTE — PROGRESS NOTES
CT SURGERY PROGRESS NOTE  Ata Pickens  68 y.o.  1955    Patients Procedure: Procedure(s) (LRB):  CORONARY ARTERY BYPASS GRAFT (CABG) (N/A)  SURGICAL PROCUREMENT, VEIN, ENDOSCOPIC (Left)  EXCLUSION, LEFT ATRIAL APPENDAGE (N/A)    Subjective  Interval History: Patient is postoperative day 2 from CABG and ligation of left atrial appendage. No acute events overnight.  Up in chair this morning, c/o some incisional pain as well as pain at chest tube insertion sites.  Remains on supplemental oxygen - 3 L NC.     Review of Systems   Constitutional:  Negative for chills, fever and malaise/fatigue.   Respiratory:  Negative for cough, shortness of breath and wheezing.    Cardiovascular:  Positive for chest pain (incisional). Negative for palpitations.   Gastrointestinal:  Negative for nausea and vomiting.    Medication List  Infusions   dextrose 5 % and 0.45 % NaCl 100 mL/hr at 03/26/24 2212    loperamide       Scheduled   aspirin  81 mg Oral Daily    atorvastatin  40 mg Oral Daily    docusate sodium  100 mg Oral BID    enoxparin  40 mg Subcutaneous Daily    folic acid  1 mg Oral Daily    LIDOcaine HCL 20 mg/ml (2%)  2 mL Intradermal Once    mupirocin   Nasal BID    pantoprazole  40 mg Oral Daily    sucralfate  1 g Oral QID (AC & HS)       Objective:  Recent Vitals:  Temp:  [97.2 °F (36.2 °C)-98.8 °F (37.1 °C)] 97.2 °F (36.2 °C)  Pulse:  [65-89] 81  Resp:  [11-30] 30  SpO2:  [96 %-99 %] 98 %  BP: (107-144)/(60-85) 132/79    Physical Exam  Constitutional:       General: He is not in acute distress.     Appearance: Normal appearance. He is not ill-appearing.   HENT:      Head: Normocephalic and atraumatic.   Cardiovascular:      Rate and Rhythm: Normal rate and regular rhythm.      Pulses: Normal pulses.      Heart sounds: Normal heart sounds. No murmur heard.     No friction rub. No gallop.   Pulmonary:      Effort: Pulmonary effort is normal. No respiratory distress.      Breath sounds: Normal breath sounds. No  wheezing, rhonchi or rales.      Comments: Ct x 3.  Dressed. C/d/I.    3L NC  Abdominal:      General: There is no distension.      Palpations: Abdomen is soft.   Musculoskeletal:      Right lower leg: No edema.      Left lower leg: No edema.   Skin:     General: Skin is warm and dry.      Comments: Median sternotomy incision dressed - c/d/i   Neurological:      General: No focal deficit present.      Mental Status: He is oriented to person, place, and time.   Psychiatric:         Mood and Affect: Mood normal.         Behavior: Behavior normal.     I/O last 24 hrs:  Intake/Output - Last 3 Shifts         03/25 0700 03/26 0659 03/26 0700 03/27 0659 03/27 0700 03/28 0659    P.O.       I.V. (mL/kg) 4125.7 (79.3) 1965.3 (36.9)     Blood 637      IV Piggyback 1155      Total Intake(mL/kg) 5917.7 (113.8) 1965.3 (36.9)     Urine (mL/kg/hr) 1993 (1.6) 825 (0.6)     Stool       Blood 600      Chest Tube 1165 535     Total Output 3758 1360     Net +2159.7 +605.3                    Labs  CBC:   Recent Labs   Lab 03/27/24  0148   WBC 12.80*  12.76*   RBC 3.55*  3.50*   HGB 10.9*  11.0*   HCT 32.8*  31.8*   *  125*   MCV 92.4  90.9   MCH 30.7  31.4*   MCHC 33.2  34.6     CMP:   Recent Labs   Lab 03/27/24  0148   CALCIUM 8.6*   ALBUMIN 3.0*   *   K 4.1   CO2 20*   BUN 12.6   CREATININE 0.67*   ALKPHOS 59   ALT 23   AST 85*   BILITOT 1.3     LFTs:   Recent Labs   Lab 03/27/24  0148   ALT 23   AST 85*   ALKPHOS 59   BILITOT 1.3   ALBUMIN 3.0*     All pertinent labs from the last 24 hours have been reviewed.      Imaging:   CXR: X-Ray Chest AP Portable    Result Date: 3/27/2024  No adverse interval change.  Findings as above. Electronically signed by: Darío Little Date:    03/27/2024 Time:    07:16   I have reviewed all pertinent imaging results/findings within the past 24 hours.        ASSESSMENT/PLAN:    Severe coronary artery disease  HTN  PAD  ROBBI    -Mediastinal ct output: 65 cc / 24 hrs; L ct output:  140 cc / 24 hrs; R ct output: 330 cc / 24 hours.  Serosanguinous.  Remove mediastinal ct.  Right and left chest tubes placed to water seal.    -CXR stable  -H&H stable: 10.9 / 32.8  -Cr: 0.67  -Continue still catheter for now due to difficult insertion in OR  -Diurese.  Wean oxygen as tolerated  -Progressive mobilization and regular IS use  -Awaiting telemetry bed    Case and plan of care discussed with LEXII AguirreP

## 2024-03-28 LAB
ALBUMIN SERPL-MCNC: 2.8 G/DL (ref 3.4–4.8)
ALBUMIN/GLOB SERPL: 0.9 RATIO (ref 1.1–2)
ALP SERPL-CCNC: 64 UNIT/L (ref 40–150)
ALT SERPL-CCNC: 23 UNIT/L (ref 0–55)
AST SERPL-CCNC: 52 UNIT/L (ref 5–34)
BILIRUB SERPL-MCNC: 1 MG/DL
BUN SERPL-MCNC: 19 MG/DL (ref 8.4–25.7)
CALCIUM SERPL-MCNC: 9.3 MG/DL (ref 8.8–10)
CHLORIDE SERPL-SCNC: 100 MMOL/L (ref 98–107)
CO2 SERPL-SCNC: 26 MMOL/L (ref 23–31)
CREAT SERPL-MCNC: 0.71 MG/DL (ref 0.73–1.18)
ERYTHROCYTE [DISTWIDTH] IN BLOOD BY AUTOMATED COUNT: 13.8 % (ref 11.5–17)
GFR SERPLBLD CREATININE-BSD FMLA CKD-EPI: >60 MLS/MIN/1.73/M2
GLOBULIN SER-MCNC: 3.1 GM/DL (ref 2.4–3.5)
GLUCOSE SERPL-MCNC: 105 MG/DL (ref 82–115)
HCT VFR BLD AUTO: 32.9 % (ref 42–52)
HGB BLD-MCNC: 11.2 G/DL (ref 14–18)
MAGNESIUM SERPL-MCNC: 1.9 MG/DL (ref 1.6–2.6)
MCH RBC QN AUTO: 31.3 PG (ref 27–31)
MCHC RBC AUTO-ENTMCNC: 34 G/DL (ref 33–36)
MCV RBC AUTO: 91.9 FL (ref 80–94)
NRBC BLD AUTO-RTO: 0 %
PLATELET # BLD AUTO: 125 X10(3)/MCL (ref 130–400)
PLATELETS.RETICULATED NFR BLD AUTO: 9.2 % (ref 0.9–11.2)
PMV BLD AUTO: 11.5 FL (ref 7.4–10.4)
POCT GLUCOSE: 118 MG/DL (ref 70–110)
POCT GLUCOSE: 129 MG/DL (ref 70–110)
POCT GLUCOSE: 150 MG/DL (ref 70–110)
POTASSIUM SERPL-SCNC: 4.4 MMOL/L (ref 3.5–5.1)
PROT SERPL-MCNC: 5.9 GM/DL (ref 5.8–7.6)
RBC # BLD AUTO: 3.58 X10(6)/MCL (ref 4.7–6.1)
RBCS: NORMAL
SODIUM SERPL-SCNC: 134 MMOL/L (ref 136–145)
WBC # SPEC AUTO: 13.44 X10(3)/MCL (ref 4.5–11.5)

## 2024-03-28 PROCEDURE — 63600175 PHARM REV CODE 636 W HCPCS

## 2024-03-28 PROCEDURE — 85027 COMPLETE CBC AUTOMATED: CPT | Performed by: PHYSICIAN ASSISTANT

## 2024-03-28 PROCEDURE — 83735 ASSAY OF MAGNESIUM: CPT | Performed by: NURSE PRACTITIONER

## 2024-03-28 PROCEDURE — 94760 N-INVAS EAR/PLS OXIMETRY 1: CPT

## 2024-03-28 PROCEDURE — 25000003 PHARM REV CODE 250: Performed by: NURSE PRACTITIONER

## 2024-03-28 PROCEDURE — 21400001 HC TELEMETRY ROOM

## 2024-03-28 PROCEDURE — 27000221 HC OXYGEN, UP TO 24 HOURS

## 2024-03-28 PROCEDURE — 97110 THERAPEUTIC EXERCISES: CPT

## 2024-03-28 PROCEDURE — 97162 PT EVAL MOD COMPLEX 30 MIN: CPT

## 2024-03-28 PROCEDURE — 25000003 PHARM REV CODE 250: Performed by: PHYSICIAN ASSISTANT

## 2024-03-28 PROCEDURE — 63600175 PHARM REV CODE 636 W HCPCS: Performed by: PHYSICIAN ASSISTANT

## 2024-03-28 PROCEDURE — 97166 OT EVAL MOD COMPLEX 45 MIN: CPT

## 2024-03-28 PROCEDURE — 80053 COMPREHEN METABOLIC PANEL: CPT | Performed by: PHYSICIAN ASSISTANT

## 2024-03-28 PROCEDURE — 99024 POSTOP FOLLOW-UP VISIT: CPT | Mod: ,,,

## 2024-03-28 RX ORDER — LANOLIN ALCOHOL/MO/W.PET/CERES
400 CREAM (GRAM) TOPICAL 2 TIMES DAILY
Status: COMPLETED | OUTPATIENT
Start: 2024-03-28 | End: 2024-03-30

## 2024-03-28 RX ORDER — FUROSEMIDE 10 MG/ML
40 INJECTION INTRAMUSCULAR; INTRAVENOUS ONCE
Status: COMPLETED | OUTPATIENT
Start: 2024-03-28 | End: 2024-03-28

## 2024-03-28 RX ADMIN — METOPROLOL SUCCINATE 12.5 MG: 25 TABLET, EXTENDED RELEASE ORAL at 09:03

## 2024-03-28 RX ADMIN — PANTOPRAZOLE SODIUM 40 MG: 40 TABLET, DELAYED RELEASE ORAL at 09:03

## 2024-03-28 RX ADMIN — AMLODIPINE BESYLATE 5 MG: 5 TABLET ORAL at 09:03

## 2024-03-28 RX ADMIN — Medication 400 MG: at 03:03

## 2024-03-28 RX ADMIN — OXYCODONE HYDROCHLORIDE 10 MG: 5 TABLET ORAL at 08:03

## 2024-03-28 RX ADMIN — OXYCODONE HYDROCHLORIDE 10 MG: 5 TABLET ORAL at 01:03

## 2024-03-28 RX ADMIN — ACETAMINOPHEN 650 MG: 650 SOLUTION ORAL at 10:03

## 2024-03-28 RX ADMIN — ATORVASTATIN CALCIUM 40 MG: 40 TABLET, FILM COATED ORAL at 09:03

## 2024-03-28 RX ADMIN — SUCRALFATE 1 G: 1 TABLET ORAL at 04:03

## 2024-03-28 RX ADMIN — ENOXAPARIN SODIUM 40 MG: 40 INJECTION SUBCUTANEOUS at 05:03

## 2024-03-28 RX ADMIN — OXYCODONE HYDROCHLORIDE 10 MG: 5 TABLET ORAL at 04:03

## 2024-03-28 RX ADMIN — DOCUSATE SODIUM 100 MG: 50 CAPSULE, LIQUID FILLED ORAL at 09:03

## 2024-03-28 RX ADMIN — FUROSEMIDE 40 MG: 10 INJECTION, SOLUTION INTRAMUSCULAR; INTRAVENOUS at 03:03

## 2024-03-28 RX ADMIN — SUCRALFATE 1 G: 1 TABLET ORAL at 08:03

## 2024-03-28 RX ADMIN — FOLIC ACID 1 MG: 1 TABLET ORAL at 09:03

## 2024-03-28 RX ADMIN — OXYCODONE HYDROCHLORIDE 10 MG: 5 TABLET ORAL at 09:03

## 2024-03-28 RX ADMIN — DOCUSATE SODIUM 100 MG: 50 CAPSULE, LIQUID FILLED ORAL at 08:03

## 2024-03-28 RX ADMIN — ASPIRIN 81 MG: 81 TABLET, COATED ORAL at 09:03

## 2024-03-28 RX ADMIN — Medication 400 MG: at 08:03
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
## 2024-03-28 NOTE — PROGRESS NOTES
CT SURGERY PROGRESS NOTE  Ata Pickens  68 y.o.  1955    Patients Procedure: Procedure(s) (LRB):  CORONARY ARTERY BYPASS GRAFT (CABG) (N/A)  SURGICAL PROCUREMENT, VEIN, ENDOSCOPIC (Left)  EXCLUSION, LEFT ATRIAL APPENDAGE (N/A)    Subjective  Interval History: Patient is postoperative day 3 from CABG and ligation of left atrial appendage. No acute events overnight.  Resting in bed, family at bedside.  Remains on supplemental oxygen - 2 L NC.  C/o some incisional chest pain and pain at ct insertion sites.     Review of Systems   Constitutional:  Negative for chills, fever and malaise/fatigue.   Respiratory:  Negative for cough, shortness of breath and wheezing.    Cardiovascular:  Positive for chest pain (incisional). Negative for palpitations.   Gastrointestinal:  Negative for nausea and vomiting.    Medication List  Infusions   dextrose 5 % and 0.45 % NaCl 100 mL/hr at 03/26/24 2212    loperamide       Scheduled   amLODIPine  5 mg Oral Daily    aspirin  81 mg Oral Daily    atorvastatin  40 mg Oral Daily    docusate sodium  100 mg Oral BID    enoxparin  40 mg Subcutaneous Daily    folic acid  1 mg Oral Daily    LIDOcaine HCL 20 mg/ml (2%)  2 mL Intradermal Once    metoprolol succinate  12.5 mg Oral Daily    pantoprazole  40 mg Oral Daily    sucralfate  1 g Oral QID (AC & HS)       Objective:  Recent Vitals:  Temp:  [97.6 °F (36.4 °C)-98.6 °F (37 °C)] 97.6 °F (36.4 °C)  Pulse:  [73-86] 74  Resp:  [16-18] 16  SpO2:  [96 %-99 %] 97 %  BP: (111-144)/(65-83) 144/83    Physical Exam  Constitutional:       General: He is not in acute distress.     Appearance: Normal appearance. He is not ill-appearing.   HENT:      Head: Normocephalic and atraumatic.   Cardiovascular:      Rate and Rhythm: Normal rate and regular rhythm.      Pulses: Normal pulses.      Heart sounds: Normal heart sounds. No murmur heard.     No friction rub. No gallop.   Pulmonary:      Effort: Pulmonary effort is normal. No respiratory distress.       Breath sounds: Diminished breath sounds. No wheezing, rhonchi or rales.      Comments: Ct x 2.  Dressed. C/d/I.               2L NC  Abdominal:      General: There is no distension.      Palpations: Abdomen is soft.   Musculoskeletal:      Right lower leg: No edema.      Left lower leg: No edema.   Skin:     General: Skin is warm and dry.      Comments: Median sternotomy incision dressed - c/d/i   Neurological:      General: No focal deficit present.      Mental Status: He is oriented to person, place, and time.   Psychiatric:         Mood and Affect: Mood normal.         Behavior: Behavior normal.     I/O last 24 hrs:  Intake/Output - Last 3 Shifts         03/26 0700  03/27 0659 03/27 0700 03/28 0659 03/28 0700 03/29 0659    P.O.  720     I.V. (mL/kg) 1965.3 (36.9)      Blood       IV Piggyback       Total Intake(mL/kg) 1965.3 (36.9) 720 (13)     Urine (mL/kg/hr) 825 (0.6) 1400 (1.1)     Stool  0     Blood       Chest Tube 535 150     Total Output 1360 1550     Net +605.3 -830            Stool Occurrence  0 x             Labs  CBC:   Recent Labs   Lab 03/28/24  0426   WBC 13.44*   RBC 3.58*   HGB 11.2*   HCT 32.9*   *   MCV 91.9   MCH 31.3*   MCHC 34.0     CMP:   Recent Labs   Lab 03/28/24  0426   CALCIUM 9.3   ALBUMIN 2.8*   *   K 4.4   CO2 26   BUN 19.0   CREATININE 0.71*   ALKPHOS 64   ALT 23   AST 52*   BILITOT 1.0     LFTs:   Recent Labs   Lab 03/28/24  0426   ALT 23   AST 52*   ALKPHOS 64   BILITOT 1.0   ALBUMIN 2.8*     All pertinent labs from the last 24 hours have been reviewed.      Imaging:   CXR: X-Ray Chest AP Portable    Result Date: 3/28/2024  Bilateral chest tubes in place without appreciable pneumothorax. Electronically signed by: Rosalba Foreman Date:    03/28/2024 Time:    06:17   I have reviewed all pertinent imaging results/findings within the past 24 hours.        ASSESSMENT/PLAN:    Severe coronary artery disease  HTN  PAD  ROBBI     - L ct output: 110 cc / 24 hrs; R ct  output: 130 cc / 24 hours.  Serosanguinous.  Discontinue.   -CXR stable  -H&H stable: 11.2 / 32.9  -Cr: 0.71  -Continue still catheter for now due to difficult insertion in OR.  Will discuss further plans with Dr. Sullivan.  Recs to follow.   -Diurese.  Wean oxygen as tolerated  -Progressive mobilization and regular IS use    Case and plan of care discussed with BOUBACAR Aguirre

## 2024-03-28 NOTE — PLAN OF CARE
Problem: Occupational Therapy  Goal: Occupational Therapy Goal  Description: LTG: Pt will perform basic ADLs and ADL transfers with Modified independence using LRAD by discharge.    STG: to be met by 4/25/24    Pt will complete grooming standing at sink with LRAD with SBA.  Pt will complete UB dressing with SBA.  Pt will complete LB dressing with SBA using LRAD and AE prn.  Pt will complete toileting with SBA using LRAD.  Pt will complete functional mobility to/from toilet and toilet transfer with SBA using LRAD.   Outcome: Ongoing, Progressing

## 2024-03-28 NOTE — PROGRESS NOTES
03/28/24 1140   Pre Exercise Vitals   /81   Pulse 77   Supplemental O2? Yes   O2 Device nasal cannula   O2 Flow (L/min) 2   SpO2 97 %   During Exercise Vitals   Pulse 79   Supplemental O2? Yes   O2 Device nasal cannula   O2 Flow (L/min) 2   SpO2 94 %   Distance Walked 25 feet   Post Exercise Vitals   /73   Pulse 79   Supplemental O2? Yes   O2 Device nasal cannula   O2 Flow (L/min) 2   SpO2 96 %   Modality   Modality Walker     Communicated with nurse pre and post walk; pt lying in bed; min assist to stand; maintains sternal precautions; walked 25 feet, O2 sat @94% on 2L; pt requiring seated rest break due to eary fatigue and dizziness; assisted pt to chair; IS @500; family @bedside

## 2024-03-28 NOTE — PLAN OF CARE
03/28/24 1240   Discharge Reassessment   Assessment Type Discharge Planning Reassessment   Did the patient's condition or plan change since previous assessment? No   Discharge Plan discussed with: Patient;Adult children   Communicated NITHYA with patient/caregiver Yes   Discharge Plan A Home Health   Discharge Plan B Home Health   Transition of Care Barriers None   Why the patient remains in the hospital Requires continued medical care   Post-Acute Status   Post-Acute Authorization Home Health   Home Health Status Referrals Sent   Discharge Delays None known at this time     Followed up with patient and family regarding patient choice list given last week. FOC obtained and placed in chart, referral sent to [facility] via [email or web address].

## 2024-03-28 NOTE — PLAN OF CARE
PT has evaluated patient and is suggesting high intensive therapy. Patient choice list presented to patient and family at bedside. FOC obtained and placed in chart. Choice: 1. [facility], 2. [facility], 3. [facility]. Referral sent to [facility] via [facility].

## 2024-03-28 NOTE — PROGRESS NOTES
[facility] - 6th Floor Medical Telemetry    Cardiology  Progress Note    Patient Name: Ata Pickens  MRN: [id number]  Admission Date: 3/21/2024  Hospital Length of Stay: 7 days  Code Status: Full Code   Attending Physician: Monse Lima MD   Primary Care Physician: Lesa Stephen FNP  Expected Discharge Date:   Principal Problem:<principal problem not specified>    Subjective:   Chief Complaint:  Outpatient Cath- MV CAD- CABG      HPI:   Mr. Pickens is a 68 year old male, known to Dr. Lima, who presented to the hospital and underwent elective coronary angiogram due to diagnosis of abnormal stress test which was performed in the outpatient setting due to reported history of chest pain, SOB, and fatigue with minimal exertion. He underwent cath on 3.21.24 revealing MV CAD including 90% distal LM/Ostial LAD Disease. Patient's EF on LV Gram noted to be 50%. Patient was admitted to [facility]. CT Surgical services consulted for CABG evaluation.    Hospital Course:  3.22.24: NAD Noted. SB on Tele. BP Stable. CP This AM, Went away on its own. No CP Currently. Right Radial Site soft.  3.23.24: NAD Noted. SR on Tele. BP Stable. On Heparin Infusion.  3.24.24: NAD Noted. Vitals Stable. SR on Tele.  3.25.24: NAD Noted. CABG Today. Tolerated well. SR on Tele. On Cleviprex Infusion. CO/CI 4.9/3.3.   3.26.24: NAD Noted. Progressing well. SR on Tele. Not requiring Pressors.  3.27.24: NAD Noted. BP Stable, some intermittent hypertension. SR on Tele. Progressing well.   3.28.24: NAD Noted. Progressing well. CT Out today. SR on Tele. Some Post Op Incisional discomfort but overall doing well.      PMH: Hypertension, Claudication, SOB, CP, Chronic Tobacco Use  PSH: Wrist Surgery, Colostomy Closure  Family History: Father- Lung Cancer, Mother- Lung Cancer, Brother- Cardiac Pacemaker  Social History: Tobacco- Active Smoker, Alcohol- Negative, Substance Abuse- Negative     Previous Cardiac Diagnostics:    CABG (3.25.24):  Procedure:  Coronary artery bypass grafting X 3 ,   Left internal mammary artery to the LAD   Saphenous venous graft to   OM  Saphenous venous graft to    distal RCA  Ligation of left atrial appendage with a 35 mm atrial clip  Endoscopic venous harvesting left greater saphenous vein    Coronary Angiogram (3.21.24):  Coronary findings:  Dominance: right   Left main:  Calcified distal 90% stenosis extending ostium of the LAD.    Left anterior descending artery:  Type three-vessel with calcified ostial stenosis of at least 70%.    Circumflex artery:  Nondominant calcified vessel with proximal 40-50% stenosis.  The circumflex gives rise to a large patent bifurcating OM.  Right coronary artery:  Calcified dominant vessel with serial heavily calcified segment stenosis of up to 80-90%.  The RCA terminates in a moderate bifurcating PDA and a small PL segment.    Selective LIMA angiogram:  Large widely patent vessel  Aortic root angiogram:  Calcified Type 3 bovine arch with patent innominate and proximal ICA bilateral.  Abdominal angiogram: No AAA.  Left iliac system patent.  Left common femoral artery patent proximally.  Right common and internal iliac patent.  Right external iliac with 70-80% stenosis at the pelvic brim.  Right common femoral occluded.  Left ventriculography:  EF- 50%.    Hemodynamics:LV/AO= 0 mmHg                      LVEDP= 13-16 mmHg       Carotid US (3.21.24):  The right internal carotid artery demonstrated 50-69% stenosis.   The left internal carotid artery demonstrated less than 50% stenosis.  Bilateral vertebral arteries were patent with antegrade flow.     Echocardiogram (3.5.24):  The left ventricle is decreased in size. Global left ventricular systolic function is borderline normal. The left ventricular ejection fraction is 50%. The left ventricle diastolic function is impaired (Grade I) with normal left atrial pressure.   The right atrium is moderately enlarged ~5.1 cm.  Mild  (1+) tricuspid and trace mitral regurgitation.  The pulmonary artery systolic pressure is 23 mmHg.  The study quality is below average.      CV US Arterial Lower Extremities (3.5.24):  The study quality is good.   The right proximal superficial femoral artery is occluded.   The right mid and distal superficial femoral, posterior tibial, peroneal, anterior tibial, and dorsalis pedis arteries exhibit poor, diminished perfusion via collateral flow.  The right lower extremity arteries exhibit mono-phasic waveforms.  The left external iliac and common femoral arteries exhibit mono-phasic waveforms.  The remaining arteries of the left lower extremity exhibit bi-phasic waveforms.      PET (3.1.24):  This is an abnormal perfusion study. Study is consistent with significant anterior  ischemia.   This scan is suggestive of moderate to high risk for future cardiovascular events.   Large partially reversible perfusion abnormality of severe intensity in the anterior apical region. Large partially reversible perfusion abnormality of severe intensity in the anterior septal region. Small reversible perfusion abnormality of severe intensity in the apical lateral segment.   The left ventricular cavity is noted to be normal on the stress studies. The stress left ventricular ejection fraction was calculated to be 37% and left ventricular global function is moderately reduced. The rest left ventricular cavity is noted to be normal. The rest left ventricular ejection fraction was calculated to be 52% and rest left ventricular global function is normal.   Hypokinesis of apical anterior segment is noted only in the stress studies which is suggestive of new ischemia. Persistent hypokinesis of the septal region is noted in both rest and stress studies. When compared to the resting ejection fraction (52%), the stress ejection fraction (37%) has decreased.   Transient ischemic dilatation is present and has been described as a marker for high  risk coronary artery disease. It has also been described in microvascular disease, hypertensive heart disease as well as cardiac deconditioning.   The study quality is good.   There was no rise in myocardial blood flow between rest and stress.  Global myocardial blood flow reserve was 0.83.  Myocardial blood flow reserve is globally abnormal, placing the patient at a higher coronary event risk.    Review of Systems   Cardiovascular:  Negative for chest pain.   Respiratory:  Negative for shortness of breath.    Musculoskeletal:         Post Op Incisional Pain     Objective:     Vital Signs (Most Recent):  Temp: 97.6 °F (36.4 °C) (03/28/24 1228)  Pulse: 74 (03/28/24 1228)  Resp: 16 (03/28/24 1228)  BP: (!) 144/83 (03/28/24 1228)  SpO2: 97 % (03/28/24 1230) Vital Signs (24h Range):  Temp:  [97.6 °F (36.4 °C)-98.6 °F (37 °C)] 97.6 °F (36.4 °C)  Pulse:  [73-86] 74  Resp:  [16-18] 16  SpO2:  [96 %-99 %] 97 %  BP: (111-144)/(65-83) 144/83   Weight: 55.4 kg (122 lb 1.6 oz)  Body mass index is 20.96 kg/m².  SpO2: 97 %       Intake/Output Summary (Last 24 hours) at 3/28/2024 1334  Last data filed at 3/27/2024 2135  Gross per 24 hour   Intake 720 ml   Output 1500 ml   Net -780 ml       Lines/Drains/Airways       Drain  Duration                  Urethral Catheter 03/25/24  18 Fr. 3 days              Line  Duration                  Pacer Wires 03/25/24 1900 2 days              Peripheral Intravenous Line  Duration                  Peripheral IV - Single Lumen 03/22/24 2100 20 G Left Forearm 5 days         Peripheral IV - Single Lumen 03/27/24 0825 18 G Anterior;Left Upper Arm 1 day                  Significant Labs:   Recent Results (from the past 72 hour(s))   RT Blood Gas    Collection Time: 03/25/24  2:05 PM   Result Value Ref Range    Sample Type Arterial Blood     Sample site Arterial Line     Drawn by LF,RRT     pH, Blood gas 7.320 (L) 7.350 - 7.450    pCO2, Blood gas 44.0 35.0 - 45.0 mmHg    pO2, Blood gas 61.0 (L) 80.0 -  100.0 mmHg    Sodium, Blood Gas 135 (L) 137 - 145 mmol/L    Potassium, Blood Gas 3.4 (L) 3.5 - 5.0 mmol/L    Calcium Level Ionized 1.27 (H) 1.12 - 1.23 mmol/L    TOC2, Blood gas 24.1 mmol/L    Base Excess, Blood gas -3.40 mmol/L    sO2, Blood gas 89.0 %    HCO3, Blood gas 22.7 22.0 - 26.0 mmol/L    Allens Test N/A     MODE SIMV     Oxygen Device, Blood gas Ventilator     FIO2, Blood gas 30 %    Mech Vt 500 ml    Mech RR 20 b/min    PEEP 5.0 cmH2O    PS 10.0 cmH2O    Itime (sec) 1 sec   POCT glucose    Collection Time: 03/25/24  2:06 PM   Result Value Ref Range    POCT Glucose 153 (H) 70 - 110 mg/dL   POCT glucose    Collection Time: 03/25/24  3:07 PM   Result Value Ref Range    POCT Glucose 94 70 - 110 mg/dL   POCT glucose    Collection Time: 03/25/24  4:18 PM   Result Value Ref Range    POCT Glucose 60 (L) 70 - 110 mg/dL   RT Blood Gas    Collection Time: 03/25/24  4:30 PM   Result Value Ref Range    Sample Type Arterial Blood     Sample site Arterial Line     Drawn by LF,RRT     pH, Blood gas 7.370 7.350 - 7.450    pCO2, Blood gas 41.0 35.0 - 45.0 mmHg    pO2, Blood gas 91.0 80.0 - 100.0 mmHg    Sodium, Blood Gas 136 (L) 137 - 145 mmol/L    Potassium, Blood Gas 3.4 (L) 3.5 - 5.0 mmol/L    Calcium Level Ionized 1.14 1.12 - 1.23 mmol/L    TOC2, Blood gas 25.0 mmol/L    Base Excess, Blood gas -1.50 mmol/L    sO2, Blood gas 97.0 %    HCO3, Blood gas 23.7 22.0 - 26.0 mmol/L    Allens Test N/A     MODE CPAP     Oxygen Device, Blood gas Ventilator     FIO2, Blood gas 40 %    PEEP 5.0 cmH2O    PS 10.0 cmH2O   POCT glucose    Collection Time: 03/25/24  4:53 PM   Result Value Ref Range    POCT Glucose 69 (L) 70 - 110 mg/dL   Potassium    Collection Time: 03/25/24  5:02 PM   Result Value Ref Range    Potassium Level 3.5 3.5 - 5.1 mmol/L   Hemoglobin and Hematocrit    Collection Time: 03/25/24  5:02 PM   Result Value Ref Range    Hgb 10.6 (L) 14.0 - 18.0 g/dL    Hct 31.5 (L) 42.0 - 52.0 %   POCT glucose    Collection Time:  03/25/24  6:03 PM   Result Value Ref Range    POCT Glucose 87 70 - 110 mg/dL   POCT glucose    Collection Time: 03/25/24  9:15 PM   Result Value Ref Range    POCT Glucose 101 70 - 110 mg/dL   Potassium    Collection Time: 03/25/24 10:34 PM   Result Value Ref Range    Potassium Level 4.4 3.5 - 5.1 mmol/L   POCT glucose    Collection Time: 03/25/24 11:55 PM   Result Value Ref Range    POCT Glucose 122 (H) 70 - 110 mg/dL   POCT glucose    Collection Time: 03/26/24  3:07 AM   Result Value Ref Range    POCT Glucose 124 (H) 70 - 110 mg/dL   Comprehensive Metabolic Panel    Collection Time: 03/26/24  3:08 AM   Result Value Ref Range    Sodium Level 135 (L) 136 - 145 mmol/L    Potassium Level 4.3 3.5 - 5.1 mmol/L    Chloride 109 (H) 98 - 107 mmol/L    Carbon Dioxide 19 (L) 23 - 31 mmol/L    Glucose Level 140 (H) 82 - 115 mg/dL    Blood Urea Nitrogen 14.1 8.4 - 25.7 mg/dL    Creatinine 0.74 0.73 - 1.18 mg/dL    Calcium Level Total 8.3 (L) 8.8 - 10.0 mg/dL    Protein Total 5.0 (L) 5.8 - 7.6 gm/dL    Albumin Level 3.1 (L) 3.4 - 4.8 g/dL    Globulin 1.9 (L) 2.4 - 3.5 gm/dL    Albumin/Globulin Ratio 1.6 1.1 - 2.0 ratio    Bilirubin Total 1.9 (H) <=1.5 mg/dL    Alkaline Phosphatase 48 40 - 150 unit/L    Alanine Aminotransferase 21 0 - 55 unit/L    Aspartate Aminotransferase 83 (H) 5 - 34 unit/L    eGFR >60 mls/min/1.73/m2   CBC Without Differential    Collection Time: 03/26/24  3:08 AM   Result Value Ref Range    WBC 10.01 4.50 - 11.50 x10(3)/mcL    RBC 3.65 (L) 4.70 - 6.10 x10(6)/mcL    Hgb 11.4 (L) 14.0 - 18.0 g/dL    Hct 33.5 (L) 42.0 - 52.0 %    MCV 91.8 80.0 - 94.0 fL    MCH 31.2 (H) 27.0 - 31.0 pg    MCHC 34.0 33.0 - 36.0 g/dL    RDW 14.6 11.5 - 17.0 %    Platelet 142 130 - 400 x10(3)/mcL    MPV 10.9 (H) 7.4 - 10.4 fL    IPF 6.7 0.9 - 11.2 %    NRBC% 0.0 %   Phosphorus    Collection Time: 03/26/24  3:08 AM   Result Value Ref Range    Phosphorus Level 3.0 2.3 - 4.7 mg/dL   Magnesium    Collection Time: 03/26/24  3:08 AM    Result Value Ref Range    Magnesium Level 1.80 1.60 - 2.60 mg/dL   APTT    Collection Time: 03/26/24  3:08 AM   Result Value Ref Range    PTT 33.0 23.2 - 33.7 seconds   POCT glucose    Collection Time: 03/26/24  8:20 AM   Result Value Ref Range    POCT Glucose 124 (H) 70 - 110 mg/dL   POCT glucose    Collection Time: 03/26/24 11:46 AM   Result Value Ref Range    POCT Glucose 135 (H) 70 - 110 mg/dL   POCT glucose    Collection Time: 03/26/24  4:20 PM   Result Value Ref Range    POCT Glucose 139 (H) 70 - 110 mg/dL   Magnesium    Collection Time: 03/27/24  1:48 AM   Result Value Ref Range    Magnesium Level 1.90 1.60 - 2.60 mg/dL   Comprehensive Metabolic Panel    Collection Time: 03/27/24  1:48 AM   Result Value Ref Range    Sodium Level 132 (L) 136 - 145 mmol/L    Potassium Level 4.1 3.5 - 5.1 mmol/L    Chloride 105 98 - 107 mmol/L    Carbon Dioxide 20 (L) 23 - 31 mmol/L    Glucose Level 150 (H) 82 - 115 mg/dL    Blood Urea Nitrogen 12.6 8.4 - 25.7 mg/dL    Creatinine 0.67 (L) 0.73 - 1.18 mg/dL    Calcium Level Total 8.6 (L) 8.8 - 10.0 mg/dL    Protein Total 5.5 (L) 5.8 - 7.6 gm/dL    Albumin Level 3.0 (L) 3.4 - 4.8 g/dL    Globulin 2.5 2.4 - 3.5 gm/dL    Albumin/Globulin Ratio 1.2 1.1 - 2.0 ratio    Bilirubin Total 1.3 <=1.5 mg/dL    Alkaline Phosphatase 59 40 - 150 unit/L    Alanine Aminotransferase 23 0 - 55 unit/L    Aspartate Aminotransferase 85 (H) 5 - 34 unit/L    eGFR >60 mls/min/1.73/m2   CBC with Differential    Collection Time: 03/27/24  1:48 AM   Result Value Ref Range    WBC 12.80 (H) 4.50 - 11.50 x10(3)/mcL    RBC 3.55 (L) 4.70 - 6.10 x10(6)/mcL    Hgb 10.9 (L) 14.0 - 18.0 g/dL    Hct 32.8 (L) 42.0 - 52.0 %    MCV 92.4 80.0 - 94.0 fL    MCH 30.7 27.0 - 31.0 pg    MCHC 33.2 33.0 - 36.0 g/dL    RDW 14.3 11.5 - 17.0 %    Platelet 125 (L) 130 - 400 x10(3)/mcL    MPV 11.3 (H) 7.4 - 10.4 fL    Neut % 83.4 %    Lymph % 6.8 %    Mono % 8.4 %    Eos % 0.2 %    Basophil % 0.5 %    Lymph # 0.87 0.6 - 4.6  x10(3)/mcL    Neut # 10.65 (H) 2.1 - 9.2 x10(3)/mcL    Mono # 1.07 0.1 - 1.3 x10(3)/mcL    Eos # 0.02 0 - 0.9 x10(3)/mcL    Baso # 0.06 <=0.2 x10(3)/mcL    IG# 0.09 (H) 0 - 0.04 x10(3)/mcL    IG% 0.7 %    NRBC% 0.0 %   CBC Without Differential    Collection Time: 03/27/24  1:48 AM   Result Value Ref Range    WBC 12.76 (H) 4.50 - 11.50 x10(3)/mcL    RBC 3.50 (L) 4.70 - 6.10 x10(6)/mcL    Hgb 11.0 (L) 14.0 - 18.0 g/dL    Hct 31.8 (L) 42.0 - 52.0 %    MCV 90.9 80.0 - 94.0 fL    MCH 31.4 (H) 27.0 - 31.0 pg    MCHC 34.6 33.0 - 36.0 g/dL    RDW 14.3 11.5 - 17.0 %    Platelet 125 (L) 130 - 400 x10(3)/mcL    MPV 11.4 (H) 7.4 - 10.4 fL    IPF 6.8 0.9 - 11.2 %    NRBC% 0.0 %   POCT glucose    Collection Time: 03/27/24  9:22 PM   Result Value Ref Range    POCT Glucose 129 (H) 70 - 110 mg/dL   POCT glucose    Collection Time: 03/28/24  4:21 AM   Result Value Ref Range    POCT Glucose 118 (H) 70 - 110 mg/dL   CBC Without Differential    Collection Time: 03/28/24  4:26 AM   Result Value Ref Range    WBC 13.44 (H) 4.50 - 11.50 x10(3)/mcL    RBC 3.58 (L) 4.70 - 6.10 x10(6)/mcL    Hgb 11.2 (L) 14.0 - 18.0 g/dL    Hct 32.9 (L) 42.0 - 52.0 %    MCV 91.9 80.0 - 94.0 fL    MCH 31.3 (H) 27.0 - 31.0 pg    MCHC 34.0 33.0 - 36.0 g/dL    RDW 13.8 11.5 - 17.0 %    Platelet 125 (L) 130 - 400 x10(3)/mcL    MPV 11.5 (H) 7.4 - 10.4 fL    IPF 9.2 0.9 - 11.2 %    NRBC% 0.0 %   Comprehensive Metabolic Panel    Collection Time: 03/28/24  4:26 AM   Result Value Ref Range    Sodium Level 134 (L) 136 - 145 mmol/L    Potassium Level 4.4 3.5 - 5.1 mmol/L    Chloride 100 98 - 107 mmol/L    Carbon Dioxide 26 23 - 31 mmol/L    Glucose Level 105 82 - 115 mg/dL    Blood Urea Nitrogen 19.0 8.4 - 25.7 mg/dL    Creatinine 0.71 (L) 0.73 - 1.18 mg/dL    Calcium Level Total 9.3 8.8 - 10.0 mg/dL    Protein Total 5.9 5.8 - 7.6 gm/dL    Albumin Level 2.8 (L) 3.4 - 4.8 g/dL    Globulin 3.1 2.4 - 3.5 gm/dL    Albumin/Globulin Ratio 0.9 (L) 1.1 - 2.0 ratio    Bilirubin  Total 1.0 <=1.5 mg/dL    Alkaline Phosphatase 64 40 - 150 unit/L    Alanine Aminotransferase 23 0 - 55 unit/L    Aspartate Aminotransferase 52 (H) 5 - 34 unit/L    eGFR >60 mls/min/1.73/m2   Magnesium    Collection Time: 03/28/24  4:26 AM   Result Value Ref Range    Magnesium Level 1.90 1.60 - 2.60 mg/dL     Telemetry:  Sinus Rhythm     Physical Exam  Vitals and nursing note reviewed.   Constitutional:       General: He is not in acute distress.     Appearance: Normal appearance. He is not ill-appearing.   HENT:      Head: Normocephalic.      Mouth/Throat:      Mouth: Mucous membranes are moist.      Pharynx: Oropharynx is clear.   Cardiovascular:      Rate and Rhythm: Normal rate and regular rhythm.      Pulses: Normal pulses.      Heart sounds: Normal heart sounds.   Pulmonary:      Effort: Pulmonary effort is normal. No respiratory distress.      Breath sounds: Normal breath sounds.      Comments: Poor Inspiratory Effort. On RA.  Abdominal:      General: There is no distension.      Palpations: Abdomen is soft.      Tenderness: There is no abdominal tenderness. There is no guarding.   Musculoskeletal:         General: Normal range of motion.      Cervical back: Neck supple.      Right lower leg: No edema.      Left lower leg: No edema.   Skin:     General: Skin is warm and dry.      Comments: Mid Sternal Incision with Dressing in Place.    Neurological:      General: No focal deficit present.      Mental Status: He is alert and oriented to person, place, and time. Mental status is at baseline.      Motor: No weakness.   Psychiatric:         Mood and Affect: Mood normal.         Behavior: Behavior normal.         Judgment: Judgment normal.       Current Inpatient Medications:    Current Facility-Administered Medications:     acetaminophen oral solution 650 mg, 650 mg, Per OG tube, Q6H PRN, Yoel Posada PA, 650 mg at 03/28/24 1008    albumin human 5% bottle 12.5 g, 12.5 g, Intravenous, PRN, Yoel Posada  KIRILL, PA, Stopped at 03/25/24 1704    aluminum-magnesium hydroxide-simethicone 200-200-20 mg/5 mL suspension 30 mL, 30 mL, Oral, Q4H PRN, Monse Lima MD, 30 mL at 03/27/24 1346    amLODIPine tablet 5 mg, 5 mg, Oral, Daily, Chetna Rivera, FNP, 5 mg at 03/28/24 0921    aspirin EC tablet 81 mg, 81 mg, Oral, Daily, Yoel Posada, PA, 81 mg at 03/28/24 0921    atorvastatin tablet 40 mg, 40 mg, Oral, Daily, Brock Young, FNP, 40 mg at 03/28/24 0921    calcium gluconate 1 g in NS IVPB (premixed), 1 g, Intravenous, PRN, Yoel Posada P, PA    calcium gluconate 1 g in NS IVPB (premixed), 2 g, Intravenous, PRN, Yoel Posada P, PA    calcium gluconate 1 g in NS IVPB (premixed), 3 g, Intravenous, PRN, Yoel Posada P, PA    dextrose 5 % and 0.45 % NaCl infusion, , Intravenous, Continuous, Yoel Posada P, PA, Last Rate: 100 mL/hr at 03/26/24 2212, New Bag at 03/26/24 2212    diazePAM tablet 10 mg, 10 mg, Oral, On Call Procedure, Monse Lima MD, 10 mg at 03/21/24 1021    diphenhydrAMINE capsule 50 mg, 50 mg, Oral, On Call Procedure, Monse Lima MD, 50 mg at 03/21/24 1021    docusate sodium capsule 100 mg, 100 mg, Oral, BID, Yoel Posada P, PA, 100 mg at 03/28/24 0921    enoxaparin injection 40 mg, 40 mg, Subcutaneous, Daily, Yoel Posada P, PA, 40 mg at 03/27/24 1612    folic acid tablet 1 mg, 1 mg, Oral, Daily, Yoel Posada P, PA, 1 mg at 03/28/24 0921    furosemide injection 40 mg, 40 mg, Intravenous, Once, Yocham, Karla, FNP    heparin, porcine (PF) 100 unit/mL injection flush 500 Units, 5 mL, Intravenous, On Call Procedure, Reji Sullivan MD    hydrALAZINE injection 10 mg, 10 mg, Intravenous, Q4H PRN, Chetna Rivera, FNP, 10 mg at 03/22/24 1903    HYDROcodone-acetaminophen 5-325 mg per tablet 1 tablet, 1 tablet, Oral, Q4H PRN, Yoel Posada, PA    lactulose 10 gram/15 ml solution 20 g, 20 g, Oral, Q6H PRN, Yoel Posada, PA    LIDOcaine HCL 20 mg/ml (2%)  injection 2 mL, 2 mL, Intradermal, Once, Reji Sullivan MD    loperamide capsule 2 mg, 2 mg, Oral, Continuous PRN, Yoel Posada, PA    magnesium sulfate 2g in water 50mL IVPB (premix), 2 g, Intravenous, PRN, Yoel Posada, PA, Stopped at 03/26/24 0644    magnesium sulfate 2g in water 50mL IVPB (premix), 4 g, Intravenous, PRN, Yoel Posada, PA    metoclopramide injection 5 mg, 5 mg, Intravenous, Q6H PRN, Yoel Psoada, PA    metoprolol succinate (TOPROL-XL) 24 hr split tablet 12.5 mg, 12.5 mg, Oral, Daily, Chetna Rivera, LEXIIP, 12.5 mg at 03/28/24 0921    morphine injection 2 mg, 2 mg, Intravenous, Q4H PRN, Karla Joseph FNP, 2 mg at 03/25/24 2020    morphine injection 4 mg, 4 mg, Intravenous, Q4H PRN, Yoel Posada, PA    mupirocin 2 % ointment, , Nasal, On Call Procedure, Reji Sullivan MD    nitroGLYCERIN SL tablet 0.4 mg, 0.4 mg, Sublingual, Q5 Min PRN, Chetna Rivera, FNP, 0.4 mg at 03/22/24 1900    ondansetron injection 4 mg, 4 mg, Intravenous, Q12H PRN, Brock Young, LEXIIP    ondansetron injection 4 mg, 4 mg, Intravenous, Q4H PRN, Yoel Posada, PA, 4 mg at 03/27/24 0224    oxyCODONE immediate release tablet Tab 10 mg, 10 mg, Oral, Q4H PRN, Yoel Posada, PA, 10 mg at 03/28/24 0921    pantoprazole EC tablet 40 mg, 40 mg, Oral, Daily, Chetna Rivera, FNP, 40 mg at 03/28/24 0921    potassium chloride 20 mEq in 100 mL IVPB (FOR CENTRAL LINE ADMINISTRATION ONLY), 20 mEq, Intravenous, PRN, Yoel Posada, PA, Last Rate: 50 mL/hr at 03/25/24 1817, 20 mEq at 03/25/24 1817    potassium chloride 20 mEq in 100 mL IVPB (FOR CENTRAL LINE ADMINISTRATION ONLY), 40 mEq, Intravenous, PRN, Yoel Posada PA    potassium chloride 20 mEq in 100 mL IVPB (FOR CENTRAL LINE ADMINISTRATION ONLY), 20 mEq, Intravenous, PRN, Yoel Posada PA    sodium chloride 0.9% flush 10 mL, 10 mL, Intravenous, PRN, Monse Lima MD    sodium phosphate 15 mmol in dextrose 5 % (D5W)  250 mL IVPB, 15 mmol, Intravenous, PRN, Yoel Posada P, PA    sucralfate tablet 1 g, 1 g, Oral, QID (AC & HS), Yoel Posada, PA, 1 g at 03/28/24 0436  VTE Risk Mitigation (From admission, onward)           Ordered     enoxaparin injection 40 mg  Daily         03/25/24 1134     Place IVANA hose  Until discontinued         03/25/24 1136     heparin, porcine (PF) 100 unit/mL injection flush 500 Units  On Call Procedure         03/24/24 2216                  Assessment/Plan:   CAD (Multivessel)- Status Post CABG (3.25.24) LIMA to LAD, SVG to OM, SVG to Distal RCA (HANK Ligation)    - LM: 90% Distal, LAD: 70% Ostial, LCX: 40-50% Proximal, RCA: (Dominant) 80-90% Serially Calcified Lesions (EF 50%) (3.21.24)  Hypertension  PAD    - REIA 70-80% Stenosis at Pelvic Brim (3.21.24)  Nicotine Dependence/Chronic Tobacco Use  ROBBI    - 50-69% MUSTAPHA (CUS 3.21.24)  Anemia  Thrombocytopenia (Mild)    Impression:  Continue Aspirin 81 Mg Daily & Statin   Continue Toprol XL 12.5 Mg Daily (with Hold Parameters) & Norvasc 5 Mg Daily   Advance Mobilization as Able and Continue Regular IS Usage  Magnesium Oxide 400 Mg PO BID x 3 Days   DC Home when cleared by Surgical Team- likely Tomorrow.     Chetna Rivera, BOUBACAR  Cardiology  [facility] - 6th Floor Medical Telemetry  03/28/2024

## 2024-03-28 NOTE — PLAN OF CARE
Problem: Physical Therapy  Goal: Physical Therapy Goal  Description: Goals to be met by: d/c     Patient will increase functional independence with mobility by performin. Supine to sit with Stand-by Assistance  2. Sit to supine with Stand-by Assistance  3. Sit to stand transfer with Stand-by Assistance  4. Gait  x 150 feet with Stand-by Assistance using Least Restrictive Assistive Device.   5. Ascend/descend 2 stair with unilateral railing Stand-by Assistance using No Assistive Device.     Outcome: Ongoing, Progressing

## 2024-03-29 LAB
ANION GAP SERPL CALC-SCNC: 11 MEQ/L
BASOPHILS # BLD AUTO: 0.04 X10(3)/MCL
BASOPHILS NFR BLD AUTO: 0.4 %
BUN SERPL-MCNC: 27.6 MG/DL (ref 8.4–25.7)
CALCIUM SERPL-MCNC: 9 MG/DL (ref 8.8–10)
CHLORIDE SERPL-SCNC: 95 MMOL/L (ref 98–107)
CO2 SERPL-SCNC: 28 MMOL/L (ref 23–31)
CREAT SERPL-MCNC: 0.75 MG/DL (ref 0.73–1.18)
CREAT/UREA NIT SERPL: 37
EOSINOPHIL # BLD AUTO: 0.02 X10(3)/MCL (ref 0–0.9)
EOSINOPHIL NFR BLD AUTO: 0.2 %
ERYTHROCYTE [DISTWIDTH] IN BLOOD BY AUTOMATED COUNT: 13.5 % (ref 11.5–17)
GFR SERPLBLD CREATININE-BSD FMLA CKD-EPI: >60 MLS/MIN/1.73/M2
GLUCOSE SERPL-MCNC: 104 MG/DL (ref 82–115)
HCT VFR BLD AUTO: 31 % (ref 42–52)
HGB BLD-MCNC: 10.8 G/DL (ref 14–18)
IMM GRANULOCYTES # BLD AUTO: 0.05 X10(3)/MCL (ref 0–0.04)
IMM GRANULOCYTES NFR BLD AUTO: 0.4 %
LYMPHOCYTES # BLD AUTO: 0.84 X10(3)/MCL (ref 0.6–4.6)
LYMPHOCYTES NFR BLD AUTO: 7.4 %
MAGNESIUM SERPL-MCNC: 2 MG/DL (ref 1.6–2.6)
MCH RBC QN AUTO: 32 PG (ref 27–31)
MCHC RBC AUTO-ENTMCNC: 34.8 G/DL (ref 33–36)
MCV RBC AUTO: 92 FL (ref 80–94)
MONOCYTES # BLD AUTO: 1 X10(3)/MCL (ref 0.1–1.3)
MONOCYTES NFR BLD AUTO: 8.8 %
NEUTROPHILS # BLD AUTO: 9.39 X10(3)/MCL (ref 2.1–9.2)
NEUTROPHILS NFR BLD AUTO: 82.8 %
NRBC BLD AUTO-RTO: 0 %
PLATELET # BLD AUTO: 194 X10(3)/MCL (ref 130–400)
PMV BLD AUTO: 10.6 FL (ref 7.4–10.4)
POCT GLUCOSE: 109 MG/DL (ref 70–110)
POCT GLUCOSE: 115 MG/DL (ref 70–110)
POCT GLUCOSE: 131 MG/DL (ref 70–110)
POCT GLUCOSE: 152 MG/DL (ref 70–110)
POTASSIUM SERPL-SCNC: 4.5 MMOL/L (ref 3.5–5.1)
RBC # BLD AUTO: 3.37 X10(6)/MCL (ref 4.7–6.1)
SODIUM SERPL-SCNC: 134 MMOL/L (ref 136–145)
WBC # SPEC AUTO: 11.34 X10(3)/MCL (ref 4.5–11.5)

## 2024-03-29 PROCEDURE — 25000003 PHARM REV CODE 250: Performed by: PHYSICIAN ASSISTANT

## 2024-03-29 PROCEDURE — 85025 COMPLETE CBC W/AUTO DIFF WBC: CPT

## 2024-03-29 PROCEDURE — 21400001 HC TELEMETRY ROOM

## 2024-03-29 PROCEDURE — 93005 ELECTROCARDIOGRAM TRACING: CPT

## 2024-03-29 PROCEDURE — 63600175 PHARM REV CODE 636 W HCPCS

## 2024-03-29 PROCEDURE — 25000003 PHARM REV CODE 250: Performed by: NURSE PRACTITIONER

## 2024-03-29 PROCEDURE — 63600175 PHARM REV CODE 636 W HCPCS: Performed by: PHYSICIAN ASSISTANT

## 2024-03-29 PROCEDURE — 99024 POSTOP FOLLOW-UP VISIT: CPT | Mod: ,,, | Performed by: PHYSICIAN ASSISTANT

## 2024-03-29 PROCEDURE — 93010 ELECTROCARDIOGRAM REPORT: CPT | Mod: ,,, | Performed by: INTERNAL MEDICINE

## 2024-03-29 PROCEDURE — 97116 GAIT TRAINING THERAPY: CPT | Mod: CQ

## 2024-03-29 PROCEDURE — 97530 THERAPEUTIC ACTIVITIES: CPT | Mod: CQ

## 2024-03-29 PROCEDURE — 80048 BASIC METABOLIC PNL TOTAL CA: CPT

## 2024-03-29 PROCEDURE — 83735 ASSAY OF MAGNESIUM: CPT

## 2024-03-29 RX ORDER — ENOXAPARIN SODIUM 100 MG/ML
1 INJECTION SUBCUTANEOUS EVERY 12 HOURS
Status: DISCONTINUED | OUTPATIENT
Start: 2024-03-29 | End: 2024-03-30

## 2024-03-29 RX ORDER — FUROSEMIDE 10 MG/ML
40 INJECTION INTRAMUSCULAR; INTRAVENOUS ONCE
Status: COMPLETED | OUTPATIENT
Start: 2024-03-29 | End: 2024-03-29

## 2024-03-29 RX ADMIN — AMLODIPINE BESYLATE 5 MG: 5 TABLET ORAL at 10:03

## 2024-03-29 RX ADMIN — OXYCODONE HYDROCHLORIDE 10 MG: 5 TABLET ORAL at 10:03

## 2024-03-29 RX ADMIN — AMIODARONE HYDROCHLORIDE 0.5 MG/MIN: 1.8 INJECTION, SOLUTION INTRAVENOUS at 09:03

## 2024-03-29 RX ADMIN — SUCRALFATE 1 G: 1 TABLET ORAL at 04:03

## 2024-03-29 RX ADMIN — OXYCODONE HYDROCHLORIDE 10 MG: 5 TABLET ORAL at 02:03

## 2024-03-29 RX ADMIN — OXYCODONE HYDROCHLORIDE 10 MG: 5 TABLET ORAL at 05:03

## 2024-03-29 RX ADMIN — Medication 400 MG: at 08:03

## 2024-03-29 RX ADMIN — DOCUSATE SODIUM 100 MG: 50 CAPSULE, LIQUID FILLED ORAL at 08:03

## 2024-03-29 RX ADMIN — FOLIC ACID 1 MG: 1 TABLET ORAL at 10:03

## 2024-03-29 RX ADMIN — SUCRALFATE 1 G: 1 TABLET ORAL at 10:03

## 2024-03-29 RX ADMIN — ENOXAPARIN SODIUM 50 MG: 60 INJECTION SUBCUTANEOUS at 05:03

## 2024-03-29 RX ADMIN — SUCRALFATE 1 G: 1 TABLET ORAL at 08:03

## 2024-03-29 RX ADMIN — OXYCODONE HYDROCHLORIDE 10 MG: 5 TABLET ORAL at 06:03

## 2024-03-29 RX ADMIN — PANTOPRAZOLE SODIUM 40 MG: 40 TABLET, DELAYED RELEASE ORAL at 10:03

## 2024-03-29 RX ADMIN — AMIODARONE HYDROCHLORIDE 150 MG: 1.5 INJECTION, SOLUTION INTRAVENOUS at 03:03

## 2024-03-29 RX ADMIN — DOCUSATE SODIUM 100 MG: 50 CAPSULE, LIQUID FILLED ORAL at 10:03

## 2024-03-29 RX ADMIN — FUROSEMIDE 40 MG: 10 INJECTION, SOLUTION INTRAMUSCULAR; INTRAVENOUS at 10:03

## 2024-03-29 RX ADMIN — Medication 400 MG: at 10:03

## 2024-03-29 RX ADMIN — METOPROLOL SUCCINATE 12.5 MG: 25 TABLET, EXTENDED RELEASE ORAL at 10:03

## 2024-03-29 RX ADMIN — ATORVASTATIN CALCIUM 40 MG: 40 TABLET, FILM COATED ORAL at 10:03

## 2024-03-29 RX ADMIN — ASPIRIN 81 MG: 81 TABLET, COATED ORAL at 10:03

## 2024-03-29 RX ADMIN — OXYCODONE HYDROCHLORIDE 10 MG: 5 TABLET ORAL at 12:03

## 2024-03-29 RX ADMIN — AMIODARONE HYDROCHLORIDE 1 MG/MIN: 1.8 INJECTION, SOLUTION INTRAVENOUS at 04:03

## 2024-03-29 RX ADMIN — SUCRALFATE 1 G: 1 TABLET ORAL at 05:03

## 2024-03-29 NOTE — PT/OT/SLP PROGRESS
Physical Therapy Treatment    Patient Name:  Ata Picekns   MRN:  [id number]    Recommendations:     Discharge therapy intensity: High Intensity Therapy   Discharge Equipment Recommendations:  (TBD)  Barriers to discharge: Ongoing medical needs and Placement    Assessment:     Ata Pickens is a 68 y.o. male admitted with a medical diagnosis of  s/p CABG .  He presents with the following impairments/functional limitations: weakness, impaired endurance, impaired self care skills, impaired functional mobility, gait instability, impaired balance, pain .    Rehab Prognosis: Good; patient would benefit from acute skilled PT services to address these deficits and reach maximum level of function.    Recent Surgery: Procedure(s) (LRB):  CORONARY ARTERY BYPASS GRAFT (CABG) (N/A)  SURGICAL PROCUREMENT, VEIN, ENDOSCOPIC (Left)  EXCLUSION, LEFT ATRIAL APPENDAGE (N/A) 4 Days Post-Op    Plan:     During this hospitalization, patient to be seen 6 x/week to address the identified rehab impairments via gait training, therapeutic activities, therapeutic exercises and progress toward the following goals:    Plan of Care Expires:  05/04/24    Subjective     Chief Complaint: 4/10 pain  Patient/Family Comments/goals:   Pain/Comfort:         Objective:     Communicated with RN prior to session.  Patient found HOB elevated with   upon PT entry to room.     General Precautions: Standard, sternal  Orthopedic Precautions: N/A  Braces: N/A  Respiratory Status: Nasal cannula, flow 3 L/min  Blood Pressure: 127/73  Skin Integrity: Visible skin intact      Functional Mobility:  Bed Mobility:     Supine to Sit: moderate assistance  Transfers:     Sit to Stand:  minimum assistance with rolling walker  Gait: Pt amb 30ft x1, 25ftx2 with RW Gaby. LOB 1X noted. Slow molly with step through gait. Seated rest break needed      Education:  Patient provided with verbal education education regarding PT role/goals/POC, post-op precautions, fall prevention,  and safety awareness.  Understanding was verbalized, however additional teaching warranted.     Patient left up in chair with all lines intact, call button in reach, leonardt cushion, RN notified, and grandson present    GOALS:   Multidisciplinary Problems       Physical Therapy Goals          Problem: Physical Therapy    Goal Priority Disciplines Outcome Goal Variances Interventions   Physical Therapy Goal     PT, PT/OT Ongoing, Progressing     Description: Goals to be met by: d/c     Patient will increase functional independence with mobility by performin. Supine to sit with Stand-by Assistance  2. Sit to supine with Stand-by Assistance  3. Sit to stand transfer with Stand-by Assistance  4. Gait  x 150 feet with Stand-by Assistance using Least Restrictive Assistive Device.   5. Ascend/descend 2 stair with unilateral railing Stand-by Assistance using No Assistive Device.                          Time Tracking:     PT Received On: 24  PT Start Time: 1119     PT Stop Time: 1145  PT Total Time (min): 26 min     Billable Minutes: Gait Training 15 and Therapeutic Activity 11    Treatment Type: Treatment  PT/PTA: PTA     Number of PTA visits since last PT visit: 2024

## 2024-03-29 NOTE — PROGRESS NOTES
Case discussed with Dr. Schneider.  Pt found to have newly diagnosed PAF.   Transition to FD Lovenox BID for CVA prophylaxis in the setting of PAF (ok w/ CTS).  Start amio bolus & gtt per protocol.

## 2024-03-29 NOTE — PROGRESS NOTES
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 (FOR CENTRAL LINE ADMINISTRATION ONLY), 20 mEq, Intravenous, PRN, Yoel Posada P, PA    sodium chloride 0.9% flush 10 mL, 10 mL, Intravenous, PRN, Monse Lima MD    sodium phosphate 15 mmol in dextrose 5 % (D5W) 250 mL IVPB, 15 mmol, Intravenous, PRN, Yoel Posada P, PA    sucralfate tablet 1 g, 1 g, Oral, QID (AC & HS), Yoel Posada P, PA, 1 g at 03/29/24 1004    Blood pressure 122/73, pulse 69, temperature 97.6 °F (36.4 °C), temperature source Oral, resp. rate (!) 22, height 5' 4" (1.626 m), weight 50.4 kg (111 lb 1.8 oz), SpO2 96 %.    PE    GEN  No acute distress  Not ill appearing    Has cough   NECK  No cervical adenopathy  Supple  CV  tachycardic  Regular rhythm  No murmur  PUL  No respiratory distress  No wheezing  No crackles   Mild rhonchi   ABD  No distention  No tenderness  LOW EXT  No deformity  No edema  SKIN  No bruising  No rash  NEURO " " Oriented x 3  No weakness      Labs  Last BMP BMP  Lab Results   Component Value Date     (L) 03/29/2024    K 4.5 03/29/2024    CO2 28 03/29/2024    BUN 27.6 (H) 03/29/2024    CREATININE 0.75 03/29/2024    CALCIUM 9.0 03/29/2024    EGFRNORACEVR >60 03/29/2024      Lab Results   Component Value Date    CREATININE 0.75 03/29/2024    CREATININE 0.71 (L) 03/28/2024    CREATININE 0.67 (L) 03/27/2024     Last CBC     Lab Results   Component Value Date    WBC 11.34 03/29/2024    HGB 10.8 (L) 03/29/2024    HCT 31.0 (L) 03/29/2024    MCV 92.0 03/29/2024     03/29/2024           Lab Results   Component Value Date    HGB 10.8 (L) 03/29/2024    HGB 11.2 (L) 03/28/2024    HGB 10.9 (L) 03/27/2024    HGB 11.0 (L) 03/27/2024    HCT 31.0 (L) 03/29/2024    HCT 32.9 (L) 03/28/2024    HCT 32.8 (L) 03/27/2024    HCT 31.8 (L) 03/27/2024     BNP  No results found for: "BNP"  Troponin   Lab Results   Component Value Date    TROPONINI <0.017 07/03/2019    TROPONINI <0.017 06/28/2019     Last lipids    Lab Results   Component Value Date    CHOL 207 (H) 11/22/2021    HDL 58 11/22/2021    .00 11/22/2021    TRIG 76 11/22/2021    TOTALCHOLEST 4 11/22/2021      LFT     Lab Results   Component Value Date    ALT 23 03/28/2024    ALT 23 03/27/2024    ALT 21 03/26/2024    AST 52 (H) 03/28/2024    AST 85 (H) 03/27/2024    AST 83 (H) 03/26/2024       Echo  No results found for this or any previous visit.    Stress test  No results found for this or any previous visit.    Coronary Angiogram  Results for orders placed during the hospital encounter of 03/21/24    Cardiac catheterization    Conclusion    Critical left main with heavily calcified distal 90% extending into the ostium of the LAD.  Ostial circumflex with calcified 50-60% stenosis.    [place] angiogram demonstrating large patent LIMA    Abdominal angiogram demonstrating no AAA with a right external iliac 60-70% and occlusion of the right common femoral.  Left iliac system " patent with patent left common femoral artery.    EF 50% with EDP 13-16 mmHg.    The estimated blood loss was less than 10 cc.    Right radial access.    The procedure log was documented by No documenter listed and verified by Monse Lima MD.    Date: 3/21/2024  Time: 11:45 AM    Procedure:  Selective coronary angiography  Left ventriculography  Left heart catheterization  Moderate (Conscious) Sedation  Peripheral angiogram of the abdominal aorta  Aortic root angiogram  Selective LIMA angiogram      Indication:  Cardiomyopathy with LAD ischemia on perfusion imaging.  Consent: The patient was brought to the cardiac catheterization lab. Was instructed and explained about the risk, benefit and alternatives of the procedure included but not limited to sudden cardiac death, myocardial infarction, bleeding, vascular injury, renal failure, stroke, contrast allergy, risk of conscious sedation and need for emergent bypass surgery.  the patient was agreeable to proceed.  Signed the consent form.  Access:  The patient was prepped using the usual sterile fashion.  Right radial artery  was accessed with micropuncture technique, ultrasound guidance.  An image of the ultrasound was put in the paper chart for purpose of documentation.  Sheath size:6F    During the course of the major a into the subclavian artery.  A selective LIMA angiogram was performed.    Dale test:  Verified with pulse oximetry deemed to be adequate for radial artery procedure.    Diagnostic catheters :  Joe, pigtail and 4 Vietnamese LIMA catheter    Coronary findings:  Dominance: right  Left main:  Calcified distal 90% stenosis extending ostium of the LAD.  Left anterior descending artery:  Type three-vessel with calcified ostial stenosis of at least 70%.  Circumflex artery:  Nondominant calcified vessel with proximal 40-50% stenosis.  The circumflex gives rise to a large patent bifurcating OM.  Right coronary artery:  Calcified dominant vessel with  serial heavily calcified segment stenosis of up to 80-90%.  The RCA terminates in a moderate bifurcating PDA and a small PL segment.    Selective LIMA angiogram:  Large widely patent vessel  Aortic root angiogram:  Calcified Type 3 bovine arch with patent innominate and proximal ICA bilateral.  Abdominal angiogram: No AAA.  Left iliac system patent.  Left common femoral artery patent proximally.  Right common and internal iliac patent.  Right external iliac with 70-80% stenosis at the pelvic brim.  Right common femoral occluded.    Left ventriculography:  EF- 50%.  Hemodynamics:LV/AO= 0 mmHg                      LVEDP= 13-16 mmHg    Access Closure :  At the end of the procedure the sheath was removed. A TR band applied to the right radial artery . Excellent hemostasis achieved.    Impression & Plan:  Critical left main stenosis extending into the ostium of the LAD with severe focal RCA stenosis.  Obtaining surgical consult for CABG evaluation.  The EF was 50% with EDP 13-16 mmHg.    Holter Monitor  No cardiac monitor results found for the past 12 months    Assessment/Plan  I agree with the assessment and plan as outlined above      Jose Ramsey MD  03/29/2024  12:23 PM  Cardiologist

## 2024-03-29 NOTE — PLAN OF CARE
Problem: Adult Inpatient Plan of Care  Goal: Plan of Care Review  Outcome: Ongoing, Not Progressing  Goal: Patient-Specific Goal (Individualized)  Outcome: Ongoing, Not Progressing  Goal: Absence of Hospital-Acquired Illness or Injury  Outcome: Ongoing, Not Progressing  Goal: Optimal Comfort and Wellbeing  Outcome: Ongoing, Not Progressing  Goal: Readiness for Transition of Care  Outcome: Ongoing, Not Progressing     Problem: Impaired Wound Healing  Goal: Optimal Wound Healing  Outcome: Ongoing, Not Progressing     Problem: Infection  Goal: Absence of Infection Signs and Symptoms  Outcome: Ongoing, Not Progressing

## 2024-03-30 LAB
ALBUMIN SERPL-MCNC: 2.7 G/DL (ref 3.4–4.8)
ALBUMIN/GLOB SERPL: 1.1 RATIO (ref 1.1–2)
ALP SERPL-CCNC: 102 UNIT/L (ref 40–150)
ALT SERPL-CCNC: 24 UNIT/L (ref 0–55)
AST SERPL-CCNC: 40 UNIT/L (ref 5–34)
BASOPHILS # BLD AUTO: 0.05 X10(3)/MCL
BASOPHILS NFR BLD AUTO: 0.6 %
BILIRUB SERPL-MCNC: 1 MG/DL
BUN SERPL-MCNC: 27.2 MG/DL (ref 8.4–25.7)
CALCIUM SERPL-MCNC: 8.8 MG/DL (ref 8.8–10)
CHLORIDE SERPL-SCNC: 92 MMOL/L (ref 98–107)
CO2 SERPL-SCNC: 32 MMOL/L (ref 23–31)
CREAT SERPL-MCNC: 0.68 MG/DL (ref 0.73–1.18)
EOSINOPHIL # BLD AUTO: 0.09 X10(3)/MCL (ref 0–0.9)
EOSINOPHIL NFR BLD AUTO: 1 %
ERYTHROCYTE [DISTWIDTH] IN BLOOD BY AUTOMATED COUNT: 13.2 % (ref 11.5–17)
GFR SERPLBLD CREATININE-BSD FMLA CKD-EPI: >60 MLS/MIN/1.73/M2
GLOBULIN SER-MCNC: 2.5 GM/DL (ref 2.4–3.5)
GLUCOSE SERPL-MCNC: 106 MG/DL (ref 82–115)
HCT VFR BLD AUTO: 31.1 % (ref 42–52)
HGB BLD-MCNC: 10.5 G/DL (ref 14–18)
IMM GRANULOCYTES # BLD AUTO: 0.02 X10(3)/MCL (ref 0–0.04)
IMM GRANULOCYTES NFR BLD AUTO: 0.2 %
LYMPHOCYTES # BLD AUTO: 1.15 X10(3)/MCL (ref 0.6–4.6)
LYMPHOCYTES NFR BLD AUTO: 12.7 %
MAGNESIUM SERPL-MCNC: 1.9 MG/DL (ref 1.6–2.6)
MCH RBC QN AUTO: 31.4 PG (ref 27–31)
MCHC RBC AUTO-ENTMCNC: 33.8 G/DL (ref 33–36)
MCV RBC AUTO: 93.1 FL (ref 80–94)
MONOCYTES # BLD AUTO: 0.94 X10(3)/MCL (ref 0.1–1.3)
MONOCYTES NFR BLD AUTO: 10.4 %
NEUTROPHILS # BLD AUTO: 6.82 X10(3)/MCL (ref 2.1–9.2)
NEUTROPHILS NFR BLD AUTO: 75.1 %
NRBC BLD AUTO-RTO: 0 %
OHS QRS DURATION: 90 MS
OHS QRS DURATION: 94 MS
OHS QTC CALCULATION: 427 MS
OHS QTC CALCULATION: 434 MS
PLATELET # BLD AUTO: 216 X10(3)/MCL (ref 130–400)
PMV BLD AUTO: 10.1 FL (ref 7.4–10.4)
POTASSIUM SERPL-SCNC: 3.9 MMOL/L (ref 3.5–5.1)
PROT SERPL-MCNC: 5.2 GM/DL (ref 5.8–7.6)
RBC # BLD AUTO: 3.34 X10(6)/MCL (ref 4.7–6.1)
SODIUM SERPL-SCNC: 135 MMOL/L (ref 136–145)
WBC # SPEC AUTO: 9.07 X10(3)/MCL (ref 4.5–11.5)

## 2024-03-30 PROCEDURE — 27000221 HC OXYGEN, UP TO 24 HOURS

## 2024-03-30 PROCEDURE — 25000003 PHARM REV CODE 250

## 2024-03-30 PROCEDURE — 80053 COMPREHEN METABOLIC PANEL: CPT

## 2024-03-30 PROCEDURE — 25000003 PHARM REV CODE 250: Performed by: PHYSICIAN ASSISTANT

## 2024-03-30 PROCEDURE — 99024 POSTOP FOLLOW-UP VISIT: CPT | Mod: ,,, | Performed by: PHYSICIAN ASSISTANT

## 2024-03-30 PROCEDURE — 83735 ASSAY OF MAGNESIUM: CPT

## 2024-03-30 PROCEDURE — 63600175 PHARM REV CODE 636 W HCPCS

## 2024-03-30 PROCEDURE — 25000003 PHARM REV CODE 250: Performed by: NURSE PRACTITIONER

## 2024-03-30 PROCEDURE — 94760 N-INVAS EAR/PLS OXIMETRY 1: CPT

## 2024-03-30 PROCEDURE — 21400001 HC TELEMETRY ROOM

## 2024-03-30 PROCEDURE — 97110 THERAPEUTIC EXERCISES: CPT

## 2024-03-30 PROCEDURE — 25000003 PHARM REV CODE 250: Performed by: INTERNAL MEDICINE

## 2024-03-30 PROCEDURE — 85025 COMPLETE CBC W/AUTO DIFF WBC: CPT

## 2024-03-30 RX ADMIN — FOLIC ACID 1 MG: 1 TABLET ORAL at 09:03

## 2024-03-30 RX ADMIN — Medication 400 MG: at 09:03

## 2024-03-30 RX ADMIN — DOCUSATE SODIUM 100 MG: 50 CAPSULE, LIQUID FILLED ORAL at 09:03

## 2024-03-30 RX ADMIN — ENOXAPARIN SODIUM 50 MG: 60 INJECTION SUBCUTANEOUS at 04:03

## 2024-03-30 RX ADMIN — ATORVASTATIN CALCIUM 40 MG: 40 TABLET, FILM COATED ORAL at 09:03

## 2024-03-30 RX ADMIN — OXYCODONE HYDROCHLORIDE 10 MG: 5 TABLET ORAL at 06:03

## 2024-03-30 RX ADMIN — METOPROLOL SUCCINATE 12.5 MG: 25 TABLET, EXTENDED RELEASE ORAL at 09:03

## 2024-03-30 RX ADMIN — SUCRALFATE 1 G: 1 TABLET ORAL at 04:03

## 2024-03-30 RX ADMIN — OXYCODONE HYDROCHLORIDE 10 MG: 5 TABLET ORAL at 02:03

## 2024-03-30 RX ADMIN — DOCUSATE SODIUM 100 MG: 50 CAPSULE, LIQUID FILLED ORAL at 08:03

## 2024-03-30 RX ADMIN — Medication 400 MG: at 08:03

## 2024-03-30 RX ADMIN — APIXABAN 5 MG: 5 TABLET, FILM COATED ORAL at 08:03

## 2024-03-30 RX ADMIN — AMIODARONE HYDROCHLORIDE 0.5 MG/MIN: 1.8 INJECTION, SOLUTION INTRAVENOUS at 08:03

## 2024-03-30 RX ADMIN — PANTOPRAZOLE SODIUM 40 MG: 40 TABLET, DELAYED RELEASE ORAL at 09:03

## 2024-03-30 RX ADMIN — SUCRALFATE 1 G: 1 TABLET ORAL at 08:03

## 2024-03-30 RX ADMIN — ASPIRIN 81 MG: 81 TABLET, COATED ORAL at 09:03

## 2024-03-30 RX ADMIN — OXYCODONE HYDROCHLORIDE 10 MG: 5 TABLET ORAL at 07:03

## 2024-03-30 RX ADMIN — OXYCODONE HYDROCHLORIDE 10 MG: 5 TABLET ORAL at 10:03

## 2024-03-30 RX ADMIN — AMLODIPINE BESYLATE 5 MG: 5 TABLET ORAL at 09:03

## 2024-03-30 RX ADMIN — ALUMINUM HYDROXIDE, MAGNESIUM HYDROXIDE, AND DIMETHICONE 30 ML: 200; 20; 200 SUSPENSION ORAL at 07:03

## 2024-03-30 RX ADMIN — AMIODARONE HYDROCHLORIDE 0.5 MG/MIN: 1.8 INJECTION, SOLUTION INTRAVENOUS at 09:03

## 2024-03-30 NOTE — PROGRESS NOTES
03/30/24 1430   Pre Exercise Vitals   /77   Pulse 67   Supplemental O2? Yes   O2 Device nasal cannula   O2 Flow (L/min) 2   SpO2 94 %   During Exercise Vitals   Pulse 76   Supplemental O2? Yes   O2 Device nasal cannula   O2 Flow (L/min) 2   SpO2 93 %   Distance Walked 25 feet   Post Exercise Vitals   /79   Pulse 75   Supplemental O2? Yes   O2 Device nasal cannula   O2 Flow (L/min) 2   SpO2 95 %   Modality   Modality   (rollator)     Min assist x2 from sit to stand. Sternal precautions maintained. Gait steady, slow with rollator, chair followed.  No lob. Ambulated 25 feet only. Early fatigue. Back to room per chair, Encouraged incentive spirometer. Performed max 750. Communicated with nurse pre and post walk.

## 2024-03-30 NOTE — PROGRESS NOTES
" [facility] - 6th Floor [facility] Telemetry    Cardiology  Progress Note    Patient Name: Ata Pickens  MRN: [id number]  Admission Date: 3/21/2024  Hospital Length of Stay: 9 days  Code Status: Full Code   Attending Physician: Monse Lima MD   Primary Care Physician: Lesa Stephen FNP  Expected Discharge Date:   Principal Problem:<principal problem not specified>    Subjective:   Chief Complaint:  Outpatient Cath- MV CAD- CABG      HPI:   Mr. Pickens is a 68 year old male, known to Dr. Lima, who presented to the hospital and underwent elective coronary angiogram due to diagnosis of abnormal stress test which was performed in the outpatient setting due to reported history of chest pain, SOB, and fatigue with minimal exertion. He underwent cath on 3.21.24 revealing MV CAD including 90% distal LM/Ostial LAD Disease. Patient's EF on LV Gram noted to be 50%. Patient was admitted to [facility]. CT Surgical services consulted for CABG evaluation.    Hospital Course:  3.22.24: NAD Noted. SB on Tele. BP Stable. CP This AM, Went away on its own. No CP Currently. Right Radial Site soft.  3.23.24: NAD Noted. SR on Tele. BP Stable. On Heparin Infusion.  3.24.24: NAD Noted. Vitals Stable. SR on Tele.  3.25.24: NAD Noted. CABG Today. Tolerated well. SR on Tele. On Cleviprex Infusion. CO/CI 4.9/3.3.   3.26.24: NAD Noted. Progressing well. SR on Tele. Not requiring Pressors.  3.27.24: NAD Noted. BP Stable, some intermittent hypertension. SR on Tele. Progressing well.   3.28.24: NAD Noted. Progressing well. CT Out today. SR on Tele. Some Post Op Incisional discomfort but overall doing well.   3.29.24: NAD. SR on tele. + Incisional CP. On 3 L NC. Daily net negative 1048 mL/24 hours. Labs pending. "I feel okay."   3.30.24: NAD. SR on tele. + Incisional CP. Denies SOB or palps. "I'm feeling fine." BP stable.      PMH: Hypertension, Claudication, SOB, CP, Chronic Tobacco Use  PSH: Wrist Surgery, " Colostomy Closure  Family History: Father- Lung Cancer, Mother- Lung Cancer, Brother- Cardiac Pacemaker  Social History: Tobacco- Active Smoker, Alcohol- Negative, Substance Abuse- Negative     Previous Cardiac Diagnostics:   CABG (3.25.24):  Procedure:  Coronary artery bypass grafting X 3 ,   Left internal mammary artery to the LAD   Saphenous venous graft to   OM  Saphenous venous graft to    distal RCA  Ligation of left atrial appendage with a 35 mm atrial clip  Endoscopic venous harvesting left greater saphenous vein    Coronary Angiogram (3.21.24):  Coronary findings:  Dominance: right   Left main:  Calcified distal 90% stenosis extending ostium of the LAD.    Left anterior descending artery:  Type three-vessel with calcified ostial stenosis of at least 70%.    Circumflex artery:  Nondominant calcified vessel with proximal 40-50% stenosis.  The circumflex gives rise to a large patent bifurcating OM.  Right coronary artery:  Calcified dominant vessel with serial heavily calcified segment stenosis of up to 80-90%.  The RCA terminates in a moderate bifurcating PDA and a small PL segment.    Selective LIMA angiogram:  Large widely patent vessel  Aortic root angiogram:  Calcified Type 3 bovine arch with patent innominate and proximal ICA bilateral.  Abdominal angiogram: No AAA.  Left iliac system patent.  Left common femoral artery patent proximally.  Right common and internal iliac patent.  Right external iliac with 70-80% stenosis at the pelvic brim.  Right common femoral occluded.  Left ventriculography:  EF- 50%.    Hemodynamics:LV/AO= 0 mmHg                      LVEDP= 13-16 mmHg       Carotid US (3.21.24):  The right internal carotid artery demonstrated 50-69% stenosis.   The left internal carotid artery demonstrated less than 50% stenosis.  Bilateral vertebral arteries were patent with antegrade flow.     Echocardiogram (3.5.24):  The left ventricle is decreased in size. Global left ventricular systolic  function is borderline normal. The left ventricular ejection fraction is 50%. The left ventricle diastolic function is impaired (Grade I) with normal left atrial pressure.   The right atrium is moderately enlarged ~5.1 cm.  Mild (1+) tricuspid and trace mitral regurgitation.  The pulmonary artery systolic pressure is 23 mmHg.  The study quality is below average.      CV US Arterial Lower Extremities (3.5.24):  The study quality is good.   The right proximal superficial femoral artery is occluded.   The right mid and distal superficial femoral, posterior tibial, peroneal, anterior tibial, and dorsalis pedis arteries exhibit poor, diminished perfusion via collateral flow.  The right lower extremity arteries exhibit mono-phasic waveforms.  The left external iliac and common femoral arteries exhibit mono-phasic waveforms.  The remaining arteries of the left lower extremity exhibit bi-phasic waveforms.      PET (3.1.24):  This is an abnormal perfusion study. Study is consistent with significant anterior  ischemia.   This scan is suggestive of moderate to high risk for future cardiovascular events.   Large partially reversible perfusion abnormality of severe intensity in the anterior apical region. Large partially reversible perfusion abnormality of severe intensity in the anterior septal region. Small reversible perfusion abnormality of severe intensity in the apical lateral segment.   The left ventricular cavity is noted to be normal on the stress studies. The stress left ventricular ejection fraction was calculated to be 37% and left ventricular global function is moderately reduced. The rest left ventricular cavity is noted to be normal. The rest left ventricular ejection fraction was calculated to be 52% and rest left ventricular global function is normal.   Hypokinesis of apical anterior segment is noted only in the stress studies which is suggestive of new ischemia. Persistent hypokinesis of the septal region is  noted in both rest and stress studies. When compared to the resting ejection fraction (52%), the stress ejection fraction (37%) has decreased.   Transient ischemic dilatation is present and has been described as a marker for high risk coronary artery disease. It has also been described in microvascular disease, hypertensive heart disease as well as cardiac deconditioning.   The study quality is good.   There was no rise in myocardial blood flow between rest and stress.  Global myocardial blood flow reserve was 0.83.  Myocardial blood flow reserve is globally abnormal, placing the patient at a higher coronary event risk.    Review of Systems   Cardiovascular:  Negative for chest pain.   Respiratory:  Negative for shortness of breath.    Musculoskeletal:         Post Op Incisional Pain     Objective:     Vital Signs (Most Recent):  Temp: 97.6 °F (36.4 °C) (03/30/24 1143)  Pulse: 74 (03/30/24 1143)  Resp: 18 (03/30/24 1143)  BP: 133/72 (03/30/24 1143)  SpO2: 96 % (03/30/24 1143) Vital Signs (24h Range):  Temp:  [97.6 °F (36.4 °C)-98.2 °F (36.8 °C)] 97.6 °F (36.4 °C)  Pulse:  [63-78] 74  Resp:  [16-26] 18  SpO2:  [95 %-97 %] 96 %  BP: (113-144)/(67-76) 133/72   Weight: 54.1 kg (119 lb 3.2 oz)  Body mass index is 20.46 kg/m².  SpO2: 96 %       Intake/Output Summary (Last 24 hours) at 3/30/2024 1248  Last data filed at 3/29/2024 1419  Gross per 24 hour   Intake 420 ml   Output 300 ml   Net 120 ml       Lines/Drains/Airways       Drain  Duration                  Urethral Catheter 03/25/24  18 Fr. 5 days              Peripheral Intravenous Line  Duration                  Peripheral IV - Single Lumen 03/27/24 0825 18 G Anterior;Left Upper Arm 3 days                  Significant Labs:   Recent Results (from the past 72 hour(s))   POCT glucose    Collection Time: 03/27/24  9:22 PM   Result Value Ref Range    POCT Glucose 129 (H) 70 - 110 mg/dL   POCT glucose    Collection Time: 03/28/24  4:21 AM   Result Value Ref Range     POCT Glucose 118 (H) 70 - 110 mg/dL   CBC Without Differential    Collection Time: 03/28/24  4:26 AM   Result Value Ref Range    WBC 13.44 (H) 4.50 - 11.50 x10(3)/mcL    RBC 3.58 (L) 4.70 - 6.10 x10(6)/mcL    Hgb 11.2 (L) 14.0 - 18.0 g/dL    Hct 32.9 (L) 42.0 - 52.0 %    MCV 91.9 80.0 - 94.0 fL    MCH 31.3 (H) 27.0 - 31.0 pg    MCHC 34.0 33.0 - 36.0 g/dL    RDW 13.8 11.5 - 17.0 %    Platelet 125 (L) 130 - 400 x10(3)/mcL    MPV 11.5 (H) 7.4 - 10.4 fL    IPF 9.2 0.9 - 11.2 %    NRBC% 0.0 %   Comprehensive Metabolic Panel    Collection Time: 03/28/24  4:26 AM   Result Value Ref Range    Sodium Level 134 (L) 136 - 145 mmol/L    Potassium Level 4.4 3.5 - 5.1 mmol/L    Chloride 100 98 - 107 mmol/L    Carbon Dioxide 26 23 - 31 mmol/L    Glucose Level 105 82 - 115 mg/dL    Blood Urea Nitrogen 19.0 8.4 - 25.7 mg/dL    Creatinine 0.71 (L) 0.73 - 1.18 mg/dL    Calcium Level Total 9.3 8.8 - 10.0 mg/dL    Protein Total 5.9 5.8 - 7.6 gm/dL    Albumin Level 2.8 (L) 3.4 - 4.8 g/dL    Globulin 3.1 2.4 - 3.5 gm/dL    Albumin/Globulin Ratio 0.9 (L) 1.1 - 2.0 ratio    Bilirubin Total 1.0 <=1.5 mg/dL    Alkaline Phosphatase 64 40 - 150 unit/L    Alanine Aminotransferase 23 0 - 55 unit/L    Aspartate Aminotransferase 52 (H) 5 - 34 unit/L    eGFR >60 mls/min/1.73/m2   Magnesium    Collection Time: 03/28/24  4:26 AM   Result Value Ref Range    Magnesium Level 1.90 1.60 - 2.60 mg/dL   POCT glucose    Collection Time: 03/28/24  7:52 PM   Result Value Ref Range    POCT Glucose 150 (H) 70 - 110 mg/dL   POCT glucose    Collection Time: 03/29/24  3:53 AM   Result Value Ref Range    POCT Glucose 109 70 - 110 mg/dL   Basic Metabolic Panel    Collection Time: 03/29/24  8:49 AM   Result Value Ref Range    Sodium Level 134 (L) 136 - 145 mmol/L    Potassium Level 4.5 3.5 - 5.1 mmol/L    Chloride 95 (L) 98 - 107 mmol/L    Carbon Dioxide 28 23 - 31 mmol/L    Glucose Level 104 82 - 115 mg/dL    Blood Urea Nitrogen 27.6 (H) 8.4 - 25.7 mg/dL    Creatinine  0.75 0.73 - 1.18 mg/dL    BUN/Creatinine Ratio 37     Calcium Level Total 9.0 8.8 - 10.0 mg/dL    Anion Gap 11.0 mEq/L    eGFR >60 mls/min/1.73/m2   Magnesium    Collection Time: 03/29/24  8:49 AM   Result Value Ref Range    Magnesium Level 2.00 1.60 - 2.60 mg/dL   CBC with Differential    Collection Time: 03/29/24  8:49 AM   Result Value Ref Range    WBC 11.34 4.50 - 11.50 x10(3)/mcL    RBC 3.37 (L) 4.70 - 6.10 x10(6)/mcL    Hgb 10.8 (L) 14.0 - 18.0 g/dL    Hct 31.0 (L) 42.0 - 52.0 %    MCV 92.0 80.0 - 94.0 fL    MCH 32.0 (H) 27.0 - 31.0 pg    MCHC 34.8 33.0 - 36.0 g/dL    RDW 13.5 11.5 - 17.0 %    Platelet 194 130 - 400 x10(3)/mcL    MPV 10.6 (H) 7.4 - 10.4 fL    Neut % 82.8 %    Lymph % 7.4 %    Mono % 8.8 %    Eos % 0.2 %    Basophil % 0.4 %    Lymph # 0.84 0.6 - 4.6 x10(3)/mcL    Neut # 9.39 (H) 2.1 - 9.2 x10(3)/mcL    Mono # 1.00 0.1 - 1.3 x10(3)/mcL    Eos # 0.02 0 - 0.9 x10(3)/mcL    Baso # 0.04 <=0.2 x10(3)/mcL    IG# 0.05 (H) 0 - 0.04 x10(3)/mcL    IG% 0.4 %    NRBC% 0.0 %   POCT glucose    Collection Time: 03/29/24 10:41 AM   Result Value Ref Range    POCT Glucose 131 (H) 70 - 110 mg/dL   POCT glucose    Collection Time: 03/29/24  4:11 PM   Result Value Ref Range    POCT Glucose 152 (H) 70 - 110 mg/dL   POCT glucose    Collection Time: 03/29/24  7:55 PM   Result Value Ref Range    POCT Glucose 115 (H) 70 - 110 mg/dL   Magnesium    Collection Time: 03/30/24  2:40 AM   Result Value Ref Range    Magnesium Level 1.90 1.60 - 2.60 mg/dL   Comprehensive Metabolic Panel    Collection Time: 03/30/24  2:40 AM   Result Value Ref Range    Sodium Level 135 (L) 136 - 145 mmol/L    Potassium Level 3.9 3.5 - 5.1 mmol/L    Chloride 92 (L) 98 - 107 mmol/L    Carbon Dioxide 32 (H) 23 - 31 mmol/L    Glucose Level 106 82 - 115 mg/dL    Blood Urea Nitrogen 27.2 (H) 8.4 - 25.7 mg/dL    Creatinine 0.68 (L) 0.73 - 1.18 mg/dL    Calcium Level Total 8.8 8.8 - 10.0 mg/dL    Protein Total 5.2 (L) 5.8 - 7.6 gm/dL    Albumin Level 2.7  (L) 3.4 - 4.8 g/dL    Globulin 2.5 2.4 - 3.5 gm/dL    Albumin/Globulin Ratio 1.1 1.1 - 2.0 ratio    Bilirubin Total 1.0 <=1.5 mg/dL    Alkaline Phosphatase 102 40 - 150 unit/L    Alanine Aminotransferase 24 0 - 55 unit/L    Aspartate Aminotransferase 40 (H) 5 - 34 unit/L    eGFR >60 mls/min/1.73/m2   CBC with Differential    Collection Time: 03/30/24  2:40 AM   Result Value Ref Range    WBC 9.07 4.50 - 11.50 x10(3)/mcL    RBC 3.34 (L) 4.70 - 6.10 x10(6)/mcL    Hgb 10.5 (L) 14.0 - 18.0 g/dL    Hct 31.1 (L) 42.0 - 52.0 %    MCV 93.1 80.0 - 94.0 fL    MCH 31.4 (H) 27.0 - 31.0 pg    MCHC 33.8 33.0 - 36.0 g/dL    RDW 13.2 11.5 - 17.0 %    Platelet 216 130 - 400 x10(3)/mcL    MPV 10.1 7.4 - 10.4 fL    Neut % 75.1 %    Lymph % 12.7 %    Mono % 10.4 %    Eos % 1.0 %    Basophil % 0.6 %    Lymph # 1.15 0.6 - 4.6 x10(3)/mcL    Neut # 6.82 2.1 - 9.2 x10(3)/mcL    Mono # 0.94 0.1 - 1.3 x10(3)/mcL    Eos # 0.09 0 - 0.9 x10(3)/mcL    Baso # 0.05 <=0.2 x10(3)/mcL    IG# 0.02 0 - 0.04 x10(3)/mcL    IG% 0.2 %    NRBC% 0.0 %     Telemetry:  Sinus Rhythm     Physical Exam  Vitals and nursing note reviewed.   Constitutional:       General: He is not in acute distress.     Appearance: Normal appearance. He is not ill-appearing.   HENT:      Head: Normocephalic.      Mouth/Throat:      Mouth: Mucous membranes are moist.      Pharynx: Oropharynx is clear.   Eyes:      Extraocular Movements: Extraocular movements intact.   Cardiovascular:      Rate and Rhythm: Normal rate and regular rhythm.      Pulses: Normal pulses.      Heart sounds: Normal heart sounds.   Pulmonary:      Effort: Pulmonary effort is normal. No respiratory distress.      Breath sounds: Normal breath sounds.      Comments: Poor Inspiratory Effort. 3 L NC  Abdominal:      General: There is no distension.      Palpations: Abdomen is soft.      Tenderness: There is no abdominal tenderness. There is no guarding.   Musculoskeletal:         General: Normal range of motion.       Cervical back: Neck supple.      Right lower leg: No edema.      Left lower leg: No edema.   Skin:     General: Skin is warm and dry.      Comments: Mid Sternal Incision with Dressing in Place.    Neurological:      General: No focal deficit present.      Mental Status: He is alert and oriented to person, place, and time. Mental status is at baseline.      Motor: No weakness.   Psychiatric:         Mood and Affect: Mood normal.         Behavior: Behavior normal.         Judgment: Judgment normal.       Current Inpatient Medications:    Current Facility-Administered Medications:     acetaminophen oral solution 650 mg, 650 mg, Per OG tube, Q6H PRN, Yoel Posada PA, 650 mg at 03/28/24 1008    albumin human 5% bottle 12.5 g, 12.5 g, Intravenous, PRN, Yoel Posada PA, Stopped at 03/25/24 1704    aluminum-magnesium hydroxide-simethicone 200-200-20 mg/5 mL suspension 30 mL, 30 mL, Oral, Q4H PRN, Monse Lima MD, 30 mL at 03/27/24 1346    amiodarone 360 mg/200 mL (1.8 mg/mL) infusion, 0.5 mg/min, Intravenous, Continuous, Rachelle Peterson FNP, Last Rate: 16.7 mL/hr at 03/30/24 0915, 0.5 mg/min at 03/30/24 0915    amLODIPine tablet 5 mg, 5 mg, Oral, Daily, Chetna Rivera FNP, 5 mg at 03/30/24 0915    apixaban tablet 5 mg, 5 mg, Oral, BID, Rachelle Peterson FNP    aspirin EC tablet 81 mg, 81 mg, Oral, Daily, Yoel Posada PA, 81 mg at 03/30/24 0915    atorvastatin tablet 40 mg, 40 mg, Oral, Daily, Brock Young FNP, 40 mg at 03/30/24 0915    calcium gluconate 1 g in NS IVPB (premixed), 1 g, Intravenous, PRN, Yoel Posada PA    calcium gluconate 1 g in NS IVPB (premixed), 2 g, Intravenous, PRN, Yoel Posada PA    calcium gluconate 1 g in NS IVPB (premixed), 3 g, Intravenous, PRN, Yoel Posada PA    dextrose 5 % and 0.45 % NaCl infusion, , Intravenous, Continuous, Yoel Posada PA, Last Rate: 100 mL/hr at 03/26/24 2212, New Bag at 03/26/24 2212    diazePAM  tablet 10 mg, 10 mg, Oral, On Call Procedure, Monse Lima MD, 10 mg at 03/21/24 1021    diphenhydrAMINE capsule 50 mg, 50 mg, Oral, On Call Procedure, Monse Lima MD, 50 mg at 03/21/24 1021    docusate sodium capsule 100 mg, 100 mg, Oral, BID, Yoel Posada, PA, 100 mg at 03/30/24 0915    folic acid tablet 1 mg, 1 mg, Oral, Daily, Yoel Posada P, PA, 1 mg at 03/30/24 0915    heparin, porcine (PF) 100 unit/mL injection flush 500 Units, 5 mL, Intravenous, On Call Procedure, Reji Sullivan MD    hydrALAZINE injection 10 mg, 10 mg, Intravenous, Q4H PRN, Miguel Rein T, FNP, 10 mg at 03/22/24 1903    HYDROcodone-acetaminophen 5-325 mg per tablet 1 tablet, 1 tablet, Oral, Q4H PRN, Yoel Posada, PA    lactulose 10 gram/15 ml solution 20 g, 20 g, Oral, Q6H PRN, Yoel Posada P, PA    LIDOcaine HCL 20 mg/ml (2%) injection 2 mL, 2 mL, Intradermal, Once, Reji Sullivan MD    loperamide capsule 2 mg, 2 mg, Oral, Continuous PRN, Yoel Posada P, PA    magnesium oxide tablet 400 mg, 400 mg, Oral, BID, Miguel, Rein T, FNP, 400 mg at 03/30/24 0915    magnesium sulfate 2g in water 50mL IVPB (premix), 2 g, Intravenous, PRN, Yoel Posada P, PA, Stopped at 03/26/24 0644    magnesium sulfate 2g in water 50mL IVPB (premix), 4 g, Intravenous, PRN, Yoel Posada P, PA    metoclopramide injection 5 mg, 5 mg, Intravenous, Q6H PRN, Yoel Posada P, PA    metoprolol succinate (TOPROL-XL) 24 hr split tablet 12.5 mg, 12.5 mg, Oral, Daily, Miguel, Rein T, FNP, 12.5 mg at 03/30/24 0915    morphine injection 2 mg, 2 mg, Intravenous, Q4H PRN, Karla Joseph FNP, 2 mg at 03/25/24 2020    morphine injection 4 mg, 4 mg, Intravenous, Q4H PRN, Yoel Posada, PA    mupirocin 2 % ointment, , Nasal, On Call Procedure, Reji Sullivan MD    nitroGLYCERIN SL tablet 0.4 mg, 0.4 mg, Sublingual, Q5 Min PRN, Chetna Rivera, LEXIIP, 0.4 mg at 03/22/24 1900    ondansetron injection 4 mg, 4 mg, Intravenous, Q12H  PRN, Brock Young S, FNP    ondansetron injection 4 mg, 4 mg, Intravenous, Q4H PRN, Yoel Posada P, PA, 4 mg at 03/27/24 0224    oxyCODONE immediate release tablet Tab 10 mg, 10 mg, Oral, Q4H PRN, Swetha, Yoel P, PA, 10 mg at 03/30/24 0706    pantoprazole EC tablet 40 mg, 40 mg, Oral, Daily, Chetna Rivera, FNP, 40 mg at 03/30/24 0915    potassium chloride 20 mEq in 100 mL IVPB (FOR CENTRAL LINE ADMINISTRATION ONLY), 20 mEq, Intravenous, PRN, Boais, Yoel P, PA, Last Rate: 50 mL/hr at 03/25/24 1817, 20 mEq at 03/25/24 1817    potassium chloride 20 mEq in 100 mL IVPB (FOR CENTRAL LINE ADMINISTRATION ONLY), 40 mEq, Intravenous, PRN, Boais, Yoel P, PA    potassium chloride 20 mEq in 100 mL IVPB (FOR CENTRAL LINE ADMINISTRATION ONLY), 20 mEq, Intravenous, PRN, Langlinais, Yoel P, PA    sodium chloride 0.9% flush 10 mL, 10 mL, Intravenous, PRN, Monse Lima MD    sodium phosphate 15 mmol in dextrose 5 % (D5W) 250 mL IVPB, 15 mmol, Intravenous, PRN, Langrossais, Yoel P, PA    sucralfate tablet 1 g, 1 g, Oral, QID (AC & HS), Swetha, Yoel P, PA, 1 g at 03/30/24 0440  VTE Risk Mitigation (From admission, onward)           Ordered     apixaban tablet 5 mg  2 times daily         03/30/24 1247     Place IVANA hose  Until discontinued         03/25/24 1136     heparin, porcine (PF) 100 unit/mL injection flush 500 Units  On Call Procedure         03/24/24 2215                  Assessment/Plan:   Assessment:   CAD (Multivessel)- Status Post CABG (3.25.24) LIMA to LAD, SVG to OM, SVG to Distal RCA (HANK Ligation)    - LM: 90% Distal, LAD: 70% Ostial, LCX: 40-50% Proximal, RCA: (Dominant) 80-90% Serially Calcified Lesions (EF 50%) (3.21.24)  Newly Diagnosed Afib CVR - now SR    - CHADSVASC Score 3 Points   Hypertension  PAD    - REIA 70-80% Stenosis at Pelvic Brim (3.21.24)  Nicotine Dependence/Chronic Tobacco Use  ROBBI    - 50-69% MUSTAPHA (CUS 3.21.24)  Anemia  Thrombocytopenia  (Mild)    Impression:  Continue Aspirin 81 Mg Daily & Statin   Continue Toprol XL 12.5 Mg Daily  & Norvasc 5 Mg Daily   Started on amio bolus & gtt yesterday evening. Will transition to PO tomorrow.  Start Eliquis tonight for CVA prophylaxis in the setting of PAF.   Advance Mobilization as Able and Continue Regular IS Usage  Magnesium Oxide 400 Mg PO BID x 3 Days   Ensure accurate I&O's and daily weights.   Celeste remains in place.  Labs in AM: CBC, CMP, & Mg.   Possible DC tomorrow.     Rachelle Peterson, BOUBACAR  Cardiology  [facility] - 6th Floor Medical Telemetry  03/30/2024

## 2024-03-31 LAB
ALBUMIN SERPL-MCNC: 2.6 G/DL (ref 3.4–4.8)
ALBUMIN/GLOB SERPL: 0.8 RATIO (ref 1.1–2)
ALP SERPL-CCNC: 124 UNIT/L (ref 40–150)
ALT SERPL-CCNC: 36 UNIT/L (ref 0–55)
AST SERPL-CCNC: 40 UNIT/L (ref 5–34)
BASOPHILS # BLD AUTO: 0.06 X10(3)/MCL
BASOPHILS NFR BLD AUTO: 0.7 %
BILIRUB SERPL-MCNC: 0.9 MG/DL
BUN SERPL-MCNC: 21.8 MG/DL (ref 8.4–25.7)
CALCIUM SERPL-MCNC: 9.2 MG/DL (ref 8.8–10)
CHLORIDE SERPL-SCNC: 92 MMOL/L (ref 98–107)
CO2 SERPL-SCNC: 35 MMOL/L (ref 23–31)
CREAT SERPL-MCNC: 0.68 MG/DL (ref 0.73–1.18)
EOSINOPHIL # BLD AUTO: 0.16 X10(3)/MCL (ref 0–0.9)
EOSINOPHIL NFR BLD AUTO: 1.8 %
ERYTHROCYTE [DISTWIDTH] IN BLOOD BY AUTOMATED COUNT: 13.2 % (ref 11.5–17)
GFR SERPLBLD CREATININE-BSD FMLA CKD-EPI: >60 MLS/MIN/1.73/M2
GLOBULIN SER-MCNC: 3.3 GM/DL (ref 2.4–3.5)
GLUCOSE SERPL-MCNC: 112 MG/DL (ref 82–115)
HCT VFR BLD AUTO: 30.7 % (ref 42–52)
HGB BLD-MCNC: 10.5 G/DL (ref 14–18)
IMM GRANULOCYTES # BLD AUTO: 0.06 X10(3)/MCL (ref 0–0.04)
IMM GRANULOCYTES NFR BLD AUTO: 0.7 %
LYMPHOCYTES # BLD AUTO: 1.05 X10(3)/MCL (ref 0.6–4.6)
LYMPHOCYTES NFR BLD AUTO: 11.9 %
MAGNESIUM SERPL-MCNC: 2.1 MG/DL (ref 1.6–2.6)
MCH RBC QN AUTO: 31 PG (ref 27–31)
MCHC RBC AUTO-ENTMCNC: 34.2 G/DL (ref 33–36)
MCV RBC AUTO: 90.6 FL (ref 80–94)
MONOCYTES # BLD AUTO: 1.05 X10(3)/MCL (ref 0.1–1.3)
MONOCYTES NFR BLD AUTO: 11.9 %
NEUTROPHILS # BLD AUTO: 6.45 X10(3)/MCL (ref 2.1–9.2)
NEUTROPHILS NFR BLD AUTO: 73 %
NRBC BLD AUTO-RTO: 0 %
PLATELET # BLD AUTO: 257 X10(3)/MCL (ref 130–400)
PMV BLD AUTO: 10.2 FL (ref 7.4–10.4)
POTASSIUM SERPL-SCNC: 3.7 MMOL/L (ref 3.5–5.1)
PROT SERPL-MCNC: 5.9 GM/DL (ref 5.8–7.6)
RBC # BLD AUTO: 3.39 X10(6)/MCL (ref 4.7–6.1)
SODIUM SERPL-SCNC: 135 MMOL/L (ref 136–145)
WBC # SPEC AUTO: 8.83 X10(3)/MCL (ref 4.5–11.5)

## 2024-03-31 PROCEDURE — 83735 ASSAY OF MAGNESIUM: CPT

## 2024-03-31 PROCEDURE — 25000003 PHARM REV CODE 250: Performed by: NURSE PRACTITIONER

## 2024-03-31 PROCEDURE — 21400001 HC TELEMETRY ROOM

## 2024-03-31 PROCEDURE — 25000003 PHARM REV CODE 250: Performed by: PHYSICIAN ASSISTANT

## 2024-03-31 PROCEDURE — 25000003 PHARM REV CODE 250

## 2024-03-31 PROCEDURE — 97530 THERAPEUTIC ACTIVITIES: CPT | Mod: CO

## 2024-03-31 PROCEDURE — 85025 COMPLETE CBC W/AUTO DIFF WBC: CPT

## 2024-03-31 PROCEDURE — 80053 COMPREHEN METABOLIC PANEL: CPT

## 2024-03-31 PROCEDURE — 97530 THERAPEUTIC ACTIVITIES: CPT | Mod: CQ

## 2024-03-31 PROCEDURE — 97116 GAIT TRAINING THERAPY: CPT | Mod: CQ

## 2024-03-31 RX ORDER — AMIODARONE HYDROCHLORIDE 200 MG/1
200 TABLET ORAL DAILY
Status: DISCONTINUED | OUTPATIENT
Start: 2024-04-06 | End: 2024-04-05 | Stop reason: HOSPADM

## 2024-03-31 RX ORDER — AMIODARONE HYDROCHLORIDE 200 MG/1
200 TABLET ORAL 2 TIMES DAILY
Status: DISCONTINUED | OUTPATIENT
Start: 2024-04-03 | End: 2024-04-05 | Stop reason: HOSPADM

## 2024-03-31 RX ORDER — AMIODARONE HYDROCHLORIDE 200 MG/1
400 TABLET ORAL 2 TIMES DAILY
Status: COMPLETED | OUTPATIENT
Start: 2024-03-31 | End: 2024-04-02

## 2024-03-31 RX ADMIN — FOLIC ACID 1 MG: 1 TABLET ORAL at 08:03

## 2024-03-31 RX ADMIN — OXYCODONE HYDROCHLORIDE 10 MG: 5 TABLET ORAL at 06:03

## 2024-03-31 RX ADMIN — APIXABAN 5 MG: 5 TABLET, FILM COATED ORAL at 08:03

## 2024-03-31 RX ADMIN — DOCUSATE SODIUM 100 MG: 50 CAPSULE, LIQUID FILLED ORAL at 08:03

## 2024-03-31 RX ADMIN — ATORVASTATIN CALCIUM 40 MG: 40 TABLET, FILM COATED ORAL at 08:03

## 2024-03-31 RX ADMIN — AMIODARONE HYDROCHLORIDE 400 MG: 200 TABLET ORAL at 08:03

## 2024-03-31 RX ADMIN — SUCRALFATE 1 G: 1 TABLET ORAL at 08:03

## 2024-03-31 RX ADMIN — METOPROLOL SUCCINATE 12.5 MG: 25 TABLET, EXTENDED RELEASE ORAL at 08:03

## 2024-03-31 RX ADMIN — OXYCODONE HYDROCHLORIDE 10 MG: 5 TABLET ORAL at 10:03

## 2024-03-31 RX ADMIN — DOCUSATE SODIUM 100 MG: 50 CAPSULE, LIQUID FILLED ORAL at 09:03

## 2024-03-31 RX ADMIN — ASPIRIN 81 MG: 81 TABLET, COATED ORAL at 08:03

## 2024-03-31 RX ADMIN — SUCRALFATE 1 G: 1 TABLET ORAL at 06:03

## 2024-03-31 RX ADMIN — OXYCODONE HYDROCHLORIDE 10 MG: 5 TABLET ORAL at 02:03

## 2024-03-31 RX ADMIN — AMLODIPINE BESYLATE 5 MG: 5 TABLET ORAL at 08:03

## 2024-03-31 RX ADMIN — PANTOPRAZOLE SODIUM 40 MG: 40 TABLET, DELAYED RELEASE ORAL at 08:03

## 2024-03-31 NOTE — PROGRESS NOTES
"  [facility] - 6th Floor Medical Telemetry    Cardiology  Progress Note    Patient Name: Ata Pickens  MRN: [id number]  Admission Date: 3/21/2024  Hospital Length of Stay: 10 days  Code Status: Full Code   Attending Physician: Monse Lima MD   Primary Care Physician: Lesa Stephen FNP  Expected Discharge Date:   Principal Problem:<principal problem not specified>    Subjective:   Chief Complaint:  Outpatient Cath- MV CAD- CABG      HPI:   Mr. Pickens is a 68 year old male, known to Dr. Lima, who presented to the hospital and underwent elective coronary angiogram due to diagnosis of abnormal stress test which was performed in the outpatient setting due to reported history of chest pain, SOB, and fatigue with minimal exertion. He underwent cath on 3.21.24 revealing MV CAD including 90% distal LM/Ostial LAD Disease. Patient's EF on LV Gram noted to be 50%. Patient was admitted to [facility]. CT Surgical services consulted for CABG evaluation.    Hospital Course:  3.22.24: NAD Noted. SB on Tele. BP Stable. CP This AM, Went away on its own. No CP Currently. Right Radial Site soft.  3.23.24: NAD Noted. SR on Tele. BP Stable. On Heparin Infusion.  3.24.24: NAD Noted. Vitals Stable. SR on Tele.  3.25.24: NAD Noted. CABG Today. Tolerated well. SR on Tele. On Cleviprex Infusion. CO/CI 4.9/3.3.   3.26.24: NAD Noted. Progressing well. SR on Tele. Not requiring Pressors.  3.27.24: NAD Noted. BP Stable, some intermittent hypertension. SR on Tele. Progressing well.   3.28.24: NAD Noted. Progressing well. CT Out today. SR on Tele. Some Post Op Incisional discomfort but overall doing well.   3.29.24: NAD. SR on tele. + Incisional CP. On 3 L NC. Daily net negative 1048 mL/24 hours. Labs pending. "I feel okay."   3.30.24: NAD. SR on tele. + Incisional CP. Denies SOB or palps. "I'm feeling fine." BP stable.   3.31.24: NAD.  SR on tele. Denies SOB or palps. Requiring 3 L NC. + Incisional CP. BP " stable.     PMH: Hypertension, Claudication, SOB, CP, Chronic Tobacco Use  PSH: Wrist Surgery, Colostomy Closure  Family History: Father- Lung Cancer, Mother- Lung Cancer, Brother- Cardiac Pacemaker  Social History: Tobacco- Active Smoker, Alcohol- Negative, Substance Abuse- Negative     Previous Cardiac Diagnostics:   CABG (3.25.24):  Procedure:  Coronary artery bypass grafting X 3 ,   Left internal mammary artery to the LAD   Saphenous venous graft to   OM  Saphenous venous graft to    distal RCA  Ligation of left atrial appendage with a 35 mm atrial clip  Endoscopic venous harvesting left greater saphenous vein    Coronary Angiogram (3.21.24):  Coronary findings:  Dominance: right   Left main:  Calcified distal 90% stenosis extending ostium of the LAD.    Left anterior descending artery:  Type three-vessel with calcified ostial stenosis of at least 70%.    Circumflex artery:  Nondominant calcified vessel with proximal 40-50% stenosis.  The circumflex gives rise to a large patent bifurcating OM.  Right coronary artery:  Calcified dominant vessel with serial heavily calcified segment stenosis of up to 80-90%.  The RCA terminates in a moderate bifurcating PDA and a small PL segment.    Selective LIMA angiogram:  Large widely patent vessel  Aortic root angiogram:  Calcified Type 3 bovine arch with patent innominate and proximal ICA bilateral.  Abdominal angiogram: No AAA.  Left iliac system patent.  Left common femoral artery patent proximally.  Right common and internal iliac patent.  Right external iliac with 70-80% stenosis at the pelvic brim.  Right common femoral occluded.  Left ventriculography:  EF- 50%.    Hemodynamics:LV/AO= 0 mmHg                      LVEDP= 13-16 mmHg       Carotid US (3.21.24):  The right internal carotid artery demonstrated 50-69% stenosis.   The left internal carotid artery demonstrated less than 50% stenosis.  Bilateral vertebral arteries were patent with antegrade flow.      Echocardiogram (3.5.24):  The left ventricle is decreased in size. Global left ventricular systolic function is borderline normal. The left ventricular ejection fraction is 50%. The left ventricle diastolic function is impaired (Grade I) with normal left atrial pressure.   The right atrium is moderately enlarged ~5.1 cm.  Mild (1+) tricuspid and trace mitral regurgitation.  The pulmonary artery systolic pressure is 23 mmHg.  The study quality is below average.      CV US Arterial Lower Extremities (3.5.24):  The study quality is good.   The right proximal superficial femoral artery is occluded.   The right mid and distal superficial femoral, posterior tibial, peroneal, anterior tibial, and dorsalis pedis arteries exhibit poor, diminished perfusion via collateral flow.  The right lower extremity arteries exhibit mono-phasic waveforms.  The left external iliac and common femoral arteries exhibit mono-phasic waveforms.  The remaining arteries of the left lower extremity exhibit bi-phasic waveforms.      PET (3.1.24):  This is an abnormal perfusion study. Study is consistent with significant anterior  ischemia.   This scan is suggestive of moderate to high risk for future cardiovascular events.   Large partially reversible perfusion abnormality of severe intensity in the anterior apical region. Large partially reversible perfusion abnormality of severe intensity in the anterior septal region. Small reversible perfusion abnormality of severe intensity in the apical lateral segment.   The left ventricular cavity is noted to be normal on the stress studies. The stress left ventricular ejection fraction was calculated to be 37% and left ventricular global function is moderately reduced. The rest left ventricular cavity is noted to be normal. The rest left ventricular ejection fraction was calculated to be 52% and rest left ventricular global function is normal.   Hypokinesis of apical anterior segment is noted only in the  stress studies which is suggestive of new ischemia. Persistent hypokinesis of the septal region is noted in both rest and stress studies. When compared to the resting ejection fraction (52%), the stress ejection fraction (37%) has decreased.   Transient ischemic dilatation is present and has been described as a marker for high risk coronary artery disease. It has also been described in microvascular disease, hypertensive heart disease as well as cardiac deconditioning.   The study quality is good.   There was no rise in myocardial blood flow between rest and stress.  Global myocardial blood flow reserve was 0.83.  Myocardial blood flow reserve is globally abnormal, placing the patient at a higher coronary event risk.    Review of Systems   Cardiovascular:  Negative for chest pain.   Respiratory:  Negative for shortness of breath.    Musculoskeletal:         Post Op Incisional Pain     Objective:     Vital Signs (Most Recent):  Temp: 97.6 °F (36.4 °C) (03/31/24 0734)  Pulse: 66 (03/31/24 0734)  Resp: 18 (03/31/24 0734)  BP: (!) 152/69 (03/31/24 0734)  SpO2: 97 % (03/31/24 0734) Vital Signs (24h Range):  Temp:  [97.5 °F (36.4 °C)-98.4 °F (36.9 °C)] 97.6 °F (36.4 °C)  Pulse:  [65-74] 66  Resp:  [18] 18  SpO2:  [94 %-97 %] 97 %  BP: (109-152)/(67-76) 152/69   Weight: 51 kg (112 lb 6.4 oz)  Body mass index is 19.29 kg/m².  SpO2: 97 %       Intake/Output Summary (Last 24 hours) at 3/31/2024 0904  Last data filed at 3/30/2024 1415  Gross per 24 hour   Intake 360 ml   Output 400 ml   Net -40 ml       Lines/Drains/Airways       Drain  Duration                  Urethral Catheter 03/25/24  18 Fr. 6 days              Peripheral Intravenous Line  Duration                  Peripheral IV - Single Lumen 03/27/24 0825 18 G Anterior;Left Upper Arm 4 days                  Significant Labs:   Recent Results (from the past 72 hour(s))   POCT glucose    Collection Time: 03/28/24  7:52 PM   Result Value Ref Range    POCT Glucose 150 (H)  70 - 110 mg/dL   POCT glucose    Collection Time: 03/29/24  3:53 AM   Result Value Ref Range    POCT Glucose 109 70 - 110 mg/dL   Basic Metabolic Panel    Collection Time: 03/29/24  8:49 AM   Result Value Ref Range    Sodium Level 134 (L) 136 - 145 mmol/L    Potassium Level 4.5 3.5 - 5.1 mmol/L    Chloride 95 (L) 98 - 107 mmol/L    Carbon Dioxide 28 23 - 31 mmol/L    Glucose Level 104 82 - 115 mg/dL    Blood Urea Nitrogen 27.6 (H) 8.4 - 25.7 mg/dL    Creatinine 0.75 0.73 - 1.18 mg/dL    BUN/Creatinine Ratio 37     Calcium Level Total 9.0 8.8 - 10.0 mg/dL    Anion Gap 11.0 mEq/L    eGFR >60 mls/min/1.73/m2   Magnesium    Collection Time: 03/29/24  8:49 AM   Result Value Ref Range    Magnesium Level 2.00 1.60 - 2.60 mg/dL   CBC with Differential    Collection Time: 03/29/24  8:49 AM   Result Value Ref Range    WBC 11.34 4.50 - 11.50 x10(3)/mcL    RBC 3.37 (L) 4.70 - 6.10 x10(6)/mcL    Hgb 10.8 (L) 14.0 - 18.0 g/dL    Hct 31.0 (L) 42.0 - 52.0 %    MCV 92.0 80.0 - 94.0 fL    MCH 32.0 (H) 27.0 - 31.0 pg    MCHC 34.8 33.0 - 36.0 g/dL    RDW 13.5 11.5 - 17.0 %    Platelet 194 130 - 400 x10(3)/mcL    MPV 10.6 (H) 7.4 - 10.4 fL    Neut % 82.8 %    Lymph % 7.4 %    Mono % 8.8 %    Eos % 0.2 %    Basophil % 0.4 %    Lymph # 0.84 0.6 - 4.6 x10(3)/mcL    Neut # 9.39 (H) 2.1 - 9.2 x10(3)/mcL    Mono # 1.00 0.1 - 1.3 x10(3)/mcL    Eos # 0.02 0 - 0.9 x10(3)/mcL    Baso # 0.04 <=0.2 x10(3)/mcL    IG# 0.05 (H) 0 - 0.04 x10(3)/mcL    IG% 0.4 %    NRBC% 0.0 %   POCT glucose    Collection Time: 03/29/24 10:41 AM   Result Value Ref Range    POCT Glucose 131 (H) 70 - 110 mg/dL   EKG 12-lead    Collection Time: 03/29/24  2:53 PM   Result Value Ref Range    QRS Duration 94 ms    [facility] QTC Calculation 427 ms   POCT glucose    Collection Time: 03/29/24  4:11 PM   Result Value Ref Range    POCT Glucose 152 (H) 70 - 110 mg/dL   EKG 12-lead    Collection Time: 03/29/24  7:02 PM   Result Value Ref Range    QRS Duration 90 ms    [facility] QTC Calculation  434 ms   POCT glucose    Collection Time: 03/29/24  7:55 PM   Result Value Ref Range    POCT Glucose 115 (H) 70 - 110 mg/dL   Magnesium    Collection Time: 03/30/24  2:40 AM   Result Value Ref Range    Magnesium Level 1.90 1.60 - 2.60 mg/dL   Comprehensive Metabolic Panel    Collection Time: 03/30/24  2:40 AM   Result Value Ref Range    Sodium Level 135 (L) 136 - 145 mmol/L    Potassium Level 3.9 3.5 - 5.1 mmol/L    Chloride 92 (L) 98 - 107 mmol/L    Carbon Dioxide 32 (H) 23 - 31 mmol/L    Glucose Level 106 82 - 115 mg/dL    Blood Urea Nitrogen 27.2 (H) 8.4 - 25.7 mg/dL    Creatinine 0.68 (L) 0.73 - 1.18 mg/dL    Calcium Level Total 8.8 8.8 - 10.0 mg/dL    Protein Total 5.2 (L) 5.8 - 7.6 gm/dL    Albumin Level 2.7 (L) 3.4 - 4.8 g/dL    Globulin 2.5 2.4 - 3.5 gm/dL    Albumin/Globulin Ratio 1.1 1.1 - 2.0 ratio    Bilirubin Total 1.0 <=1.5 mg/dL    Alkaline Phosphatase 102 40 - 150 unit/L    Alanine Aminotransferase 24 0 - 55 unit/L    Aspartate Aminotransferase 40 (H) 5 - 34 unit/L    eGFR >60 mls/min/1.73/m2   CBC with Differential    Collection Time: 03/30/24  2:40 AM   Result Value Ref Range    WBC 9.07 4.50 - 11.50 x10(3)/mcL    RBC 3.34 (L) 4.70 - 6.10 x10(6)/mcL    Hgb 10.5 (L) 14.0 - 18.0 g/dL    Hct 31.1 (L) 42.0 - 52.0 %    MCV 93.1 80.0 - 94.0 fL    MCH 31.4 (H) 27.0 - 31.0 pg    MCHC 33.8 33.0 - 36.0 g/dL    RDW 13.2 11.5 - 17.0 %    Platelet 216 130 - 400 x10(3)/mcL    MPV 10.1 7.4 - 10.4 fL    Neut % 75.1 %    Lymph % 12.7 %    Mono % 10.4 %    Eos % 1.0 %    Basophil % 0.6 %    Lymph # 1.15 0.6 - 4.6 x10(3)/mcL    Neut # 6.82 2.1 - 9.2 x10(3)/mcL    Mono # 0.94 0.1 - 1.3 x10(3)/mcL    Eos # 0.09 0 - 0.9 x10(3)/mcL    Baso # 0.05 <=0.2 x10(3)/mcL    IG# 0.02 0 - 0.04 x10(3)/mcL    IG% 0.2 %    NRBC% 0.0 %   Comprehensive Metabolic Panel    Collection Time: 03/31/24  3:38 AM   Result Value Ref Range    Sodium Level 135 (L) 136 - 145 mmol/L    Potassium Level 3.7 3.5 - 5.1 mmol/L    Chloride 92 (L) 98 -  107 mmol/L    Carbon Dioxide 35 (H) 23 - 31 mmol/L    Glucose Level 112 82 - 115 mg/dL    Blood Urea Nitrogen 21.8 8.4 - 25.7 mg/dL    Creatinine 0.68 (L) 0.73 - 1.18 mg/dL    Calcium Level Total 9.2 8.8 - 10.0 mg/dL    Protein Total 5.9 5.8 - 7.6 gm/dL    Albumin Level 2.6 (L) 3.4 - 4.8 g/dL    Globulin 3.3 2.4 - 3.5 gm/dL    Albumin/Globulin Ratio 0.8 (L) 1.1 - 2.0 ratio    Bilirubin Total 0.9 <=1.5 mg/dL    Alkaline Phosphatase 124 40 - 150 unit/L    Alanine Aminotransferase 36 0 - 55 unit/L    Aspartate Aminotransferase 40 (H) 5 - 34 unit/L    eGFR >60 mls/min/1.73/m2   Magnesium    Collection Time: 03/31/24  3:38 AM   Result Value Ref Range    Magnesium Level 2.10 1.60 - 2.60 mg/dL   CBC with Differential    Collection Time: 03/31/24  3:38 AM   Result Value Ref Range    WBC 8.83 4.50 - 11.50 x10(3)/mcL    RBC 3.39 (L) 4.70 - 6.10 x10(6)/mcL    Hgb 10.5 (L) 14.0 - 18.0 g/dL    Hct 30.7 (L) 42.0 - 52.0 %    MCV 90.6 80.0 - 94.0 fL    MCH 31.0 27.0 - 31.0 pg    MCHC 34.2 33.0 - 36.0 g/dL    RDW 13.2 11.5 - 17.0 %    Platelet 257 130 - 400 x10(3)/mcL    MPV 10.2 7.4 - 10.4 fL    Neut % 73.0 %    Lymph % 11.9 %    Mono % 11.9 %    Eos % 1.8 %    Basophil % 0.7 %    Lymph # 1.05 0.6 - 4.6 x10(3)/mcL    Neut # 6.45 2.1 - 9.2 x10(3)/mcL    Mono # 1.05 0.1 - 1.3 x10(3)/mcL    Eos # 0.16 0 - 0.9 x10(3)/mcL    Baso # 0.06 <=0.2 x10(3)/mcL    IG# 0.06 (H) 0 - 0.04 x10(3)/mcL    IG% 0.7 %    NRBC% 0.0 %     Telemetry:  Sinus Rhythm     Physical Exam  Vitals and nursing note reviewed.   Constitutional:       General: He is not in acute distress.     Appearance: Normal appearance. He is not ill-appearing.   HENT:      Head: Normocephalic.      Nose: Nose normal.      Mouth/Throat:      Mouth: Mucous membranes are moist.      Pharynx: Oropharynx is clear.   Eyes:      Extraocular Movements: Extraocular movements intact.   Cardiovascular:      Rate and Rhythm: Normal rate and regular rhythm.      Pulses: Normal pulses.       Heart sounds: Normal heart sounds.   Pulmonary:      Effort: Pulmonary effort is normal. No respiratory distress.      Breath sounds: Normal breath sounds.      Comments: 3 L NC  Abdominal:      General: There is no distension.      Palpations: Abdomen is soft.      Tenderness: There is no abdominal tenderness. There is no guarding.   Musculoskeletal:         General: Normal range of motion.      Cervical back: Neck supple.      Right lower leg: No edema.      Left lower leg: No edema.   Skin:     General: Skin is warm and dry.      Comments: Mid Sternal Incision c/d/i   Neurological:      General: No focal deficit present.      Mental Status: He is alert and oriented to person, place, and time. Mental status is at baseline.      Motor: No weakness.   Psychiatric:         Mood and Affect: Mood normal.         Behavior: Behavior normal.         Judgment: Judgment normal.       Current Inpatient Medications:    Current Facility-Administered Medications:     acetaminophen oral solution 650 mg, 650 mg, Per OG tube, Q6H PRN, Yoel Posada PA, 650 mg at 03/28/24 1008    albumin human 5% bottle 12.5 g, 12.5 g, Intravenous, PRN, Yoel Posada PA, Stopped at 03/25/24 1704    aluminum-magnesium hydroxide-simethicone 200-200-20 mg/5 mL suspension 30 mL, 30 mL, Oral, Q4H PRN, Monse Lima MD, 30 mL at 03/30/24 1903    [START ON 4/3/2024] amiodarone tablet 200 mg, 200 mg, Oral, BID, Rachelle Peterson FNP    [START ON 4/6/2024] amiodarone tablet 200 mg, 200 mg, Oral, Daily, Rachelle Peterson FNP    amiodarone tablet 400 mg, 400 mg, Oral, BID, Rachelle Peterson FNP, 400 mg at 03/31/24 0800    amLODIPine tablet 5 mg, 5 mg, Oral, Daily, Chetna Rivera FNP, 5 mg at 03/31/24 0800    apixaban tablet 5 mg, 5 mg, Oral, BID, Rachelle Peterson FNP, 5 mg at 03/31/24 0800    aspirin EC tablet 81 mg, 81 mg, Oral, Daily, Yoel Posada PA, 81 mg at 03/31/24 0800    atorvastatin tablet 40 mg, 40 mg, Oral, Daily,  Brock Young, FNP, 40 mg at 03/31/24 0800    calcium gluconate 1 g in NS IVPB (premixed), 1 g, Intravenous, PRN, Yoel Posada P, PA    calcium gluconate 1 g in NS IVPB (premixed), 2 g, Intravenous, PRN, Swetha, Yoel P, PA    calcium gluconate 1 g in NS IVPB (premixed), 3 g, Intravenous, PRN, Yoel Posada P, PA    diazePAM tablet 10 mg, 10 mg, Oral, On Call Procedure, Monse Lima MD, 10 mg at 03/21/24 1021    diphenhydrAMINE capsule 50 mg, 50 mg, Oral, On Call Procedure, Monse Lima MD, 50 mg at 03/21/24 1021    docusate sodium capsule 100 mg, 100 mg, Oral, BID, Yoel Posada P, PA, 100 mg at 03/31/24 0900    folic acid tablet 1 mg, 1 mg, Oral, Daily, Yoel Posada P, PA, 1 mg at 03/31/24 0800    heparin, porcine (PF) 100 unit/mL injection flush 500 Units, 5 mL, Intravenous, On Call Procedure, Reji Sullivan MD    hydrALAZINE injection 10 mg, 10 mg, Intravenous, Q4H PRN, Chetna Rivera FNP, 10 mg at 03/22/24 1903    HYDROcodone-acetaminophen 5-325 mg per tablet 1 tablet, 1 tablet, Oral, Q4H PRN, Yoel Posada P, PA    lactulose 10 gram/15 ml solution 20 g, 20 g, Oral, Q6H PRN, Yoel Posada P, PA    LIDOcaine HCL 20 mg/ml (2%) injection 2 mL, 2 mL, Intradermal, Once, Reji Sullivan MD    loperamide capsule 2 mg, 2 mg, Oral, Continuous PRN, Yoel Posada P, PA    magnesium sulfate 2g in water 50mL IVPB (premix), 2 g, Intravenous, PRN, Swetha Yoel P, PA, Stopped at 03/26/24 0644    magnesium sulfate 2g in water 50mL IVPB (premix), 4 g, Intravenous, PRN, Yoel Posada, PA    metoclopramide injection 5 mg, 5 mg, Intravenous, Q6H PRN, Yoel Posada PA    metoprolol succinate (TOPROL-XL) 24 hr split tablet 12.5 mg, 12.5 mg, Oral, Daily, Chetna Rivera, FNP, 12.5 mg at 03/31/24 0800    morphine injection 2 mg, 2 mg, Intravenous, Q4H PRN, Karla Joseph FNP, 2 mg at 03/25/24 2020    morphine injection 4 mg, 4 mg, Intravenous, Q4H PRN, Yoel Posada,  PA    mupirocin 2 % ointment, , Nasal, On Call Procedure, Reji Sullivan MD    nitroGLYCERIN SL tablet 0.4 mg, 0.4 mg, Sublingual, Q5 Min PRN, Chetna Rivera, FNP, 0.4 mg at 03/22/24 1900    ondansetron injection 4 mg, 4 mg, Intravenous, Q12H PRN, Brock Young, FNP    ondansetron injection 4 mg, 4 mg, Intravenous, Q4H PRN, Yoel Posada P, PA, 4 mg at 03/27/24 0224    oxyCODONE immediate release tablet Tab 10 mg, 10 mg, Oral, Q4H PRN, Yoel Posada P, PA, 10 mg at 03/31/24 0619    pantoprazole EC tablet 40 mg, 40 mg, Oral, Daily, Chetna Rivera, FNP, 40 mg at 03/31/24 0800    potassium chloride 20 mEq in 100 mL IVPB (FOR CENTRAL LINE ADMINISTRATION ONLY), 20 mEq, Intravenous, PRN, Yoel Posada P, PA, Last Rate: 50 mL/hr at 03/25/24 1817, 20 mEq at 03/25/24 1817    potassium chloride 20 mEq in 100 mL IVPB (FOR CENTRAL LINE ADMINISTRATION ONLY), 40 mEq, Intravenous, PRN, Yoel Posada P, PA    potassium chloride 20 mEq in 100 mL IVPB (FOR CENTRAL LINE ADMINISTRATION ONLY), 20 mEq, Intravenous, PRN, Yoel Posada P, PA    sodium chloride 0.9% flush 10 mL, 10 mL, Intravenous, PRN, Monse Lima MD    sodium phosphate 15 mmol in dextrose 5 % (D5W) 250 mL IVPB, 15 mmol, Intravenous, PRN, Yoel Posada P, PA    sucralfate tablet 1 g, 1 g, Oral, QID (AC & HS), Yoel Posada P, PA, 1 g at 03/31/24 0619  VTE Risk Mitigation (From admission, onward)           Ordered     apixaban tablet 5 mg  2 times daily         03/30/24 1247     Place IVANA hose  Until discontinued         03/25/24 1136     heparin, porcine (PF) 100 unit/mL injection flush 500 Units  On Call Procedure         03/24/24 5643                  Assessment/Plan:   Assessment:   CAD (Multivessel)- Status Post CABG (3.25.24) LIMA to LAD, SVG to OM, SVG to Distal RCA (HANK Ligation)    - LM: 90% Distal, LAD: 70% Ostial, LCX: 40-50% Proximal, RCA: (Dominant) 80-90% Serially Calcified Lesions (EF 50%) (3.21.24)  Newly Diagnosed Afib  CVR - now SR    - CHADSVASC Score 3 Points   Hypertension  PAD    - REIA 70-80% Stenosis at Pelvic Brim (3.21.24)  Nicotine Dependence/Chronic Tobacco Use  ROBBI    - 50-69% MUSTAPHA (CUS 3.21.24)  Anemia  Thrombocytopenia - Resolved     Impression:  Continue Aspirin 81 Mg Daily & Statin   Continue Toprol XL 12.5 Mg Daily  & Norvasc 5 Mg Daily   Transition to PO amio load: amio 400 mg BID x 3 days, then amio 200 mg BID x 3 days, then amio 200 mg daily.   Continue Eliquis tonight for CVA prophylaxis in the setting of PAF.   Advance Mobilization as Able and Continue Regular IS Usage  Ensure accurate I&O's and daily weights.   Celeste remains in place.  Labs in AM: CBC, CMP, & Mg.   Consult CM for possible rehab placement.      Patient is seen and examined.    Hemodynamics stable.    Patient appears weak-await rehab placement.    Will follow.  Rachelle Peterson, BOUBACAR  Cardiology  [facility] - 6th Floor Medical Telemetry  03/31/2024

## 2024-03-31 NOTE — PLAN OF CARE
Problem: Adult Inpatient Plan of Care  Goal: Plan of Care Review  Outcome: Ongoing, Progressing  Goal: Optimal Comfort and Wellbeing  Outcome: Ongoing, Progressing  Goal: Readiness for Transition of Care  Outcome: Ongoing, Progressing     Problem: Impaired Wound Healing  Goal: Optimal Wound Healing  Outcome: Ongoing, Progressing     Problem: Infection  Goal: Absence of Infection Signs and Symptoms  Outcome: Ongoing, Progressing     Problem: Fall Injury Risk  Goal: Absence of Fall and Fall-Related Injury  Outcome: Ongoing, Progressing

## 2024-03-31 NOTE — PT/OT/SLP PROGRESS
Occupational Therapy   Treatment    Name: Ata Pickens  MRN: [id number]  Admitting Diagnosis:  CABG X 3, & peripheral vascular disease  6 Days Post-Op    Recommendations:     Discharge Recommendations: High Intensity Therapy  Discharge Equipment Recommendations:   (TBD by next level of care)  Barriers to discharge:   (ongoing medical needs)    Assessment:     Ata Pickens is a 68 y.o. male with a medical diagnosis of CABG X 3, & peripheral vascular disease. Performance deficits affecting function are weakness, impaired endurance, impaired self care skills, impaired functional mobility, orthopedic precautions.     Rehab Prognosis:  Good; patient would benefit from acute skilled OT services to address these deficits and reach maximum level of function.       Plan:     Patient to be seen 4 x/week to address the above listed problems via self-care/home management, therapeutic activities, therapeutic exercises  Plan of Care Expires: 04/25/24  Plan of Care Reviewed with: patient    Subjective     Chief Complaint: none  Patient/Family Comments/goals: to get better  Pain/Comfort:  Pain Rating 1: 0/10    Objective:     Communicated with: RN prior to session.  Patient found supine with telemetry, pulse ox (continuous), still catheter, oxygen (cardiac bear) upon OT entry to room.    General Precautions: Standard, sternal    Orthopedic Precautions:N/A  Braces: N/A  Respiratory Status: Nasal cannula, flow 2 L/min     Occupational Performance:     Bed Mobility:    Patient completed Supine to Sit with minimum assistance  Patient completed Sit to Supine with minimum assistance     Functional Mobility/Transfers:  Patient completed Sit <> Stand Transfer with minimum assistance  with  rolling walker and and cardiac bear and with good adherence to sternal pxns.    Functional Mobility: Min A    AMPAC 6 Click ADL:      Treatment & Education:  Educated Pt on safety with mobility and sternal pxns.     Patient left supine with all lines  intact, call button in reach, and family present    GOALS:   Multidisciplinary Problems       Occupational Therapy Goals          Problem: Occupational Therapy    Goal Priority Disciplines Outcome Interventions   Occupational Therapy Goal     OT, PT/OT Ongoing, Progressing    Description: LTG: Pt will perform basic ADLs and ADL transfers with Modified independence using LRAD by discharge.    STG: to be met by 4/25/24    Pt will complete grooming standing at sink with LRAD with SBA.  Pt will complete UB dressing with SBA.  Pt will complete LB dressing with SBA using LRAD and AE prn.  Pt will complete toileting with SBA using LRAD.  Pt will complete functional mobility to/from toilet and toilet transfer with SBA using LRAD.                        Time Tracking:     OT Date of Treatment: 03/31/24  OT Start Time: 0854  OT Stop Time: 0910  OT Total Time (min): 16 min    Billable Minutes:Therapeutic Activity 19    OT/MARY ANNE: MARY ANNE     Number of MARY ANNE visits since last OT visit: 1    3/31/2024

## 2024-03-31 NOTE — PT/OT/SLP PROGRESS
Physical Therapy Treatment    Patient Name:  Ata Pickens   MRN:  [id number]    Recommendations:     Discharge therapy intensity: High Intensity Therapy   Discharge Equipment Recommendations:  (TBD)  Barriers to discharge: Ongoing medical needs and Placement    Assessment:     Ata Pickens is a 68 y.o. male admitted with a medical diagnosis of  s/p CABG .  He presents with the following impairments/functional limitations: weakness, impaired endurance, impaired self care skills, impaired functional mobility, gait instability, impaired balance, pain .    Pt octavio tx fairly but needs improvements with endurance and maintaining sternal pxns    Rehab Prognosis: Good; patient would benefit from acute skilled PT services to address these deficits and reach maximum level of function.    Recent Surgery: Procedure(s) (LRB):  CORONARY ARTERY BYPASS GRAFT (CABG) (N/A)  SURGICAL PROCUREMENT, VEIN, ENDOSCOPIC (Left)  EXCLUSION, LEFT ATRIAL APPENDAGE (N/A) 6 Days Post-Op    Plan:     During this hospitalization, patient to be seen 6 x/week to address the identified rehab impairments via gait training, therapeutic activities, therapeutic exercises and progress toward the following goals:    Plan of Care Expires:  05/04/24    Subjective     Chief Complaint: fatigue  Patient/Family Comments/goals:   Pain/Comfort:         Objective:     Communicated with RN prior to session.  Patient found up in chair with   upon PT entry to room.     General Precautions: Standard, sternal  Orthopedic Precautions: N/A  Braces: N/A  Respiratory Status: Nasal cannula, flow 2 L/min  Skin Integrity: Visible skin intact      Functional Mobility:  Transfers:     Sit to Stand:  minimum assistance with rolling walker  Gait: Pt amb 34ft x1, 15 ftx2 with RW Gaby. Slow molly with step through gait. Seated rest break needed    Therapeutic Activity:  Performed 3 sit<>stands throughout session Gaby.      Education:  Patient provided with verbal education  education regarding PT role/goals/POC, post-op precautions, fall prevention, and safety awareness.  Understanding was verbalized, however additional teaching warranted.     Patient left up in chair with all lines intact, call button in reach, geomat cushion, RN notified, and grandson present    GOALS:   Multidisciplinary Problems       Physical Therapy Goals          Problem: Physical Therapy    Goal Priority Disciplines Outcome Goal Variances Interventions   Physical Therapy Goal     PT, PT/OT Ongoing, Progressing     Description: Goals to be met by: d/c     Patient will increase functional independence with mobility by performin. Supine to sit with Stand-by Assistance  2. Sit to supine with Stand-by Assistance  3. Sit to stand transfer with Stand-by Assistance  4. Gait  x 150 feet with Stand-by Assistance using Least Restrictive Assistive Device.   5. Ascend/descend 2 stair with unilateral railing Stand-by Assistance using No Assistive Device.                          Time Tracking:     PT Received On: 24  PT Start Time: 1108     PT Stop Time: 1131  PT Total Time (min): 23 min     Billable Minutes: Gait Training 15 and Therapeutic Activity 8    Treatment Type: Treatment  PT/PTA: PTA     Number of PTA visits since last PT visit: 2     2024

## 2024-04-01 LAB
ALBUMIN SERPL-MCNC: 3 G/DL (ref 3.4–4.8)
ALBUMIN/GLOB SERPL: 1 RATIO (ref 1.1–2)
ALP SERPL-CCNC: 143 UNIT/L (ref 40–150)
ALT SERPL-CCNC: 48 UNIT/L (ref 0–55)
AST SERPL-CCNC: 45 UNIT/L (ref 5–34)
BILIRUB SERPL-MCNC: 1.2 MG/DL
BUN SERPL-MCNC: 19.1 MG/DL (ref 8.4–25.7)
CALCIUM SERPL-MCNC: 9.4 MG/DL (ref 8.8–10)
CHLORIDE SERPL-SCNC: 90 MMOL/L (ref 98–107)
CO2 SERPL-SCNC: 34 MMOL/L (ref 23–31)
CREAT SERPL-MCNC: 0.77 MG/DL (ref 0.73–1.18)
GFR SERPLBLD CREATININE-BSD FMLA CKD-EPI: >60 MLS/MIN/1.73/M2
GLOBULIN SER-MCNC: 3 GM/DL (ref 2.4–3.5)
GLUCOSE SERPL-MCNC: 137 MG/DL (ref 82–115)
POCT GLUCOSE: 128 MG/DL (ref 70–110)
POTASSIUM SERPL-SCNC: 4 MMOL/L (ref 3.5–5.1)
PROT SERPL-MCNC: 6 GM/DL (ref 5.8–7.6)
SODIUM SERPL-SCNC: 136 MMOL/L (ref 136–145)

## 2024-04-01 PROCEDURE — 21400001 HC TELEMETRY ROOM

## 2024-04-01 PROCEDURE — 97530 THERAPEUTIC ACTIVITIES: CPT | Mod: CQ

## 2024-04-01 PROCEDURE — 25000003 PHARM REV CODE 250: Performed by: PHYSICIAN ASSISTANT

## 2024-04-01 PROCEDURE — 97116 GAIT TRAINING THERAPY: CPT | Mod: CQ

## 2024-04-01 PROCEDURE — 80053 COMPREHEN METABOLIC PANEL: CPT

## 2024-04-01 PROCEDURE — 25000003 PHARM REV CODE 250

## 2024-04-01 PROCEDURE — 27000221 HC OXYGEN, UP TO 24 HOURS

## 2024-04-01 PROCEDURE — 25000003 PHARM REV CODE 250: Performed by: NURSE PRACTITIONER

## 2024-04-01 RX ORDER — POLYETHYLENE GLYCOL 3350 17 G/17G
17 POWDER, FOR SOLUTION ORAL DAILY
Status: DISCONTINUED | OUTPATIENT
Start: 2024-04-01 | End: 2024-04-05 | Stop reason: HOSPADM

## 2024-04-01 RX ADMIN — DOCUSATE SODIUM 100 MG: 50 CAPSULE, LIQUID FILLED ORAL at 08:04

## 2024-04-01 RX ADMIN — AMLODIPINE BESYLATE 5 MG: 5 TABLET ORAL at 08:04

## 2024-04-01 RX ADMIN — METOPROLOL SUCCINATE 12.5 MG: 25 TABLET, EXTENDED RELEASE ORAL at 08:04

## 2024-04-01 RX ADMIN — OXYCODONE HYDROCHLORIDE 10 MG: 5 TABLET ORAL at 10:04

## 2024-04-01 RX ADMIN — FOLIC ACID 1 MG: 1 TABLET ORAL at 08:04

## 2024-04-01 RX ADMIN — DOCUSATE SODIUM 100 MG: 50 CAPSULE, LIQUID FILLED ORAL at 09:04

## 2024-04-01 RX ADMIN — OXYCODONE HYDROCHLORIDE 10 MG: 5 TABLET ORAL at 09:04

## 2024-04-01 RX ADMIN — APIXABAN 5 MG: 5 TABLET, FILM COATED ORAL at 09:04

## 2024-04-01 RX ADMIN — POLYETHYLENE GLYCOL 3350 17 G: 17 POWDER, FOR SOLUTION ORAL at 10:04

## 2024-04-01 RX ADMIN — APIXABAN 5 MG: 5 TABLET, FILM COATED ORAL at 08:04

## 2024-04-01 RX ADMIN — AMIODARONE HYDROCHLORIDE 400 MG: 200 TABLET ORAL at 09:04

## 2024-04-01 RX ADMIN — ASPIRIN 81 MG: 81 TABLET, COATED ORAL at 08:04

## 2024-04-01 RX ADMIN — OXYCODONE HYDROCHLORIDE 10 MG: 5 TABLET ORAL at 05:04

## 2024-04-01 RX ADMIN — OXYCODONE HYDROCHLORIDE 10 MG: 5 TABLET ORAL at 06:04

## 2024-04-01 RX ADMIN — OXYCODONE HYDROCHLORIDE 10 MG: 5 TABLET ORAL at 02:04

## 2024-04-01 RX ADMIN — PANTOPRAZOLE SODIUM 40 MG: 40 TABLET, DELAYED RELEASE ORAL at 08:04

## 2024-04-01 RX ADMIN — AMIODARONE HYDROCHLORIDE 400 MG: 200 TABLET ORAL at 08:04

## 2024-04-01 RX ADMIN — ATORVASTATIN CALCIUM 40 MG: 40 TABLET, FILM COATED ORAL at 08:04

## 2024-04-01 NOTE — PROGRESS NOTES
"  [facility] - 6th Floor Medical Telemetry    Cardiology  Progress Note    Patient Name: Ata Pickens  MRN: [id number]  Admission Date: 3/21/2024  Hospital Length of Stay: 11 days  Code Status: Full Code   Attending Physician: Monse Lima MD   Primary Care Physician: Lesa Stephen FNP  Expected Discharge Date:   Principal Problem:<principal problem not specified>    Subjective:   Chief Complaint:  Outpatient Cath- MV CAD- CABG      HPI:   Mr. Pickens is a 68 year old male, known to Dr. Lima, who presented to the hospital and underwent elective coronary angiogram due to diagnosis of abnormal stress test which was performed in the outpatient setting due to reported history of chest pain, SOB, and fatigue with minimal exertion. He underwent cath on 3.21.24 revealing MV CAD including 90% distal LM/Ostial LAD Disease. Patient's EF on LV Gram noted to be 50%. Patient was admitted to [facility]. CT Surgical services consulted for CABG evaluation.    Hospital Course:  3.22.24: NAD Noted. SB on Tele. BP Stable. CP This AM, Went away on its own. No CP Currently. Right Radial Site soft.  3.23.24: NAD Noted. SR on Tele. BP Stable. On Heparin Infusion.  3.24.24: NAD Noted. Vitals Stable. SR on Tele.  3.25.24: NAD Noted. CABG Today. Tolerated well. SR on Tele. On Cleviprex Infusion. CO/CI 4.9/3.3.   3.26.24: NAD Noted. Progressing well. SR on Tele. Not requiring Pressors.  3.27.24: NAD Noted. BP Stable, some intermittent hypertension. SR on Tele. Progressing well.   3.28.24: NAD Noted. Progressing well. CT Out today. SR on Tele. Some Post Op Incisional discomfort but overall doing well.   3.29.24: NAD. SR on tele. + Incisional CP. On 3 L NC. Daily net negative 1048 mL/24 hours. Labs pending. "I feel okay."   3.30.24: NAD. SR on tele. + Incisional CP. Denies SOB or palps. "I'm feeling fine." BP stable.   3.31.24: NAD.  SR on tele. Denies SOB or palps. Requiring 3 L NC. + Incisional CP. BP " stable.   4.1.24: NAD. SR on tele. BP stable. 2 L NC. + Incisional CP. Denies SOB or palps. BP stable. SR on tele. Celeste catheter in place.     PMH: Hypertension, Claudication, SOB, CP, Chronic Tobacco Use  PSH: Wrist Surgery, Colostomy Closure  Family History: Father- Lung Cancer, Mother- Lung Cancer, Brother- Cardiac Pacemaker  Social History: Tobacco- Active Smoker, Alcohol- Negative, Substance Abuse- Negative     Previous Cardiac Diagnostics:   CABG (3.25.24):  Procedure:  Coronary artery bypass grafting X 3 ,   Left internal mammary artery to the LAD   Saphenous venous graft to   OM  Saphenous venous graft to    distal RCA  Ligation of left atrial appendage with a 35 mm atrial clip  Endoscopic venous harvesting left greater saphenous vein    Coronary Angiogram (3.21.24):  Coronary findings:  Dominance: right   Left main:  Calcified distal 90% stenosis extending ostium of the LAD.    Left anterior descending artery:  Type three-vessel with calcified ostial stenosis of at least 70%.    Circumflex artery:  Nondominant calcified vessel with proximal 40-50% stenosis.  The circumflex gives rise to a large patent bifurcating OM.  Right coronary artery:  Calcified dominant vessel with serial heavily calcified segment stenosis of up to 80-90%.  The RCA terminates in a moderate bifurcating PDA and a small PL segment.    Selective LIMA angiogram:  Large widely patent vessel  Aortic root angiogram:  Calcified Type 3 bovine arch with patent innominate and proximal ICA bilateral.  Abdominal angiogram: No AAA.  Left iliac system patent.  Left common femoral artery patent proximally.  Right common and internal iliac patent.  Right external iliac with 70-80% stenosis at the pelvic brim.  Right common femoral occluded.  Left ventriculography:  EF- 50%.    Hemodynamics:LV/AO= 0 mmHg                      LVEDP= 13-16 mmHg       Carotid US (3.21.24):  The right internal carotid artery demonstrated 50-69% stenosis.   The left  internal carotid artery demonstrated less than 50% stenosis.  Bilateral vertebral arteries were patent with antegrade flow.     Echocardiogram (3.5.24):  The left ventricle is decreased in size. Global left ventricular systolic function is borderline normal. The left ventricular ejection fraction is 50%. The left ventricle diastolic function is impaired (Grade I) with normal left atrial pressure.   The right atrium is moderately enlarged ~5.1 cm.  Mild (1+) tricuspid and trace mitral regurgitation.  The pulmonary artery systolic pressure is 23 mmHg.  The study quality is below average.      CV US Arterial Lower Extremities (3.5.24):  The study quality is good.   The right proximal superficial femoral artery is occluded.   The right mid and distal superficial femoral, posterior tibial, peroneal, anterior tibial, and dorsalis pedis arteries exhibit poor, diminished perfusion via collateral flow.  The right lower extremity arteries exhibit mono-phasic waveforms.  The left external iliac and common femoral arteries exhibit mono-phasic waveforms.  The remaining arteries of the left lower extremity exhibit bi-phasic waveforms.      PET (3.1.24):  This is an abnormal perfusion study. Study is consistent with significant anterior  ischemia.   This scan is suggestive of moderate to high risk for future cardiovascular events.   Large partially reversible perfusion abnormality of severe intensity in the anterior apical region. Large partially reversible perfusion abnormality of severe intensity in the anterior septal region. Small reversible perfusion abnormality of severe intensity in the apical lateral segment.   The left ventricular cavity is noted to be normal on the stress studies. The stress left ventricular ejection fraction was calculated to be 37% and left ventricular global function is moderately reduced. The rest left ventricular cavity is noted to be normal. The rest left ventricular ejection fraction was  calculated to be 52% and rest left ventricular global function is normal.   Hypokinesis of apical anterior segment is noted only in the stress studies which is suggestive of new ischemia. Persistent hypokinesis of the septal region is noted in both rest and stress studies. When compared to the resting ejection fraction (52%), the stress ejection fraction (37%) has decreased.   Transient ischemic dilatation is present and has been described as a marker for high risk coronary artery disease. It has also been described in microvascular disease, hypertensive heart disease as well as cardiac deconditioning.   The study quality is good.   There was no rise in myocardial blood flow between rest and stress.  Global myocardial blood flow reserve was 0.83.  Myocardial blood flow reserve is globally abnormal, placing the patient at a higher coronary event risk.    Review of Systems   Constitutional: Positive for malaise/fatigue.   Cardiovascular:  Negative for chest pain.   Respiratory:  Negative for shortness of breath.    Musculoskeletal:         Post Op Incisional Pain     Objective:     Vital Signs (Most Recent):  Temp: 97.5 °F (36.4 °C) (04/01/24 0232)  Pulse: 73 (04/01/24 0400)  Resp: 18 (04/01/24 0610)  BP: 130/76 (04/01/24 0232)  SpO2: (!) 93 % (04/01/24 0232) Vital Signs (24h Range):  Temp:  [96.9 °F (36.1 °C)-97.8 °F (36.6 °C)] 97.5 °F (36.4 °C)  Pulse:  [70-74] 73  Resp:  [16-20] 18  SpO2:  [92 %-97 %] 93 %  BP: (113-137)/(69-76) 130/76   Weight: 51 kg (112 lb 6.4 oz)  Body mass index is 19.29 kg/m².  SpO2: (!) 93 %       Intake/Output Summary (Last 24 hours) at 4/1/2024 0804  Last data filed at 3/31/2024 1505  Gross per 24 hour   Intake 680 ml   Output 900 ml   Net -220 ml       Lines/Drains/Airways       Drain  Duration                  Urethral Catheter 03/25/24  18 Fr. 7 days              Peripheral Intravenous Line  Duration                  Peripheral IV - Single Lumen 03/27/24 0825 18 G Anterior;Left Upper  Arm 4 days                  Significant Labs:   Recent Results (from the past 72 hour(s))   Basic Metabolic Panel    Collection Time: 03/29/24  8:49 AM   Result Value Ref Range    Sodium Level 134 (L) 136 - 145 mmol/L    Potassium Level 4.5 3.5 - 5.1 mmol/L    Chloride 95 (L) 98 - 107 mmol/L    Carbon Dioxide 28 23 - 31 mmol/L    Glucose Level 104 82 - 115 mg/dL    Blood Urea Nitrogen 27.6 (H) 8.4 - 25.7 mg/dL    Creatinine 0.75 0.73 - 1.18 mg/dL    BUN/Creatinine Ratio 37     Calcium Level Total 9.0 8.8 - 10.0 mg/dL    Anion Gap 11.0 mEq/L    eGFR >60 mls/min/1.73/m2   Magnesium    Collection Time: 03/29/24  8:49 AM   Result Value Ref Range    Magnesium Level 2.00 1.60 - 2.60 mg/dL   CBC with Differential    Collection Time: 03/29/24  8:49 AM   Result Value Ref Range    WBC 11.34 4.50 - 11.50 x10(3)/mcL    RBC 3.37 (L) 4.70 - 6.10 x10(6)/mcL    Hgb 10.8 (L) 14.0 - 18.0 g/dL    Hct 31.0 (L) 42.0 - 52.0 %    MCV 92.0 80.0 - 94.0 fL    MCH 32.0 (H) 27.0 - 31.0 pg    MCHC 34.8 33.0 - 36.0 g/dL    RDW 13.5 11.5 - 17.0 %    Platelet 194 130 - 400 x10(3)/mcL    MPV 10.6 (H) 7.4 - 10.4 fL    Neut % 82.8 %    Lymph % 7.4 %    Mono % 8.8 %    Eos % 0.2 %    Basophil % 0.4 %    Lymph # 0.84 0.6 - 4.6 x10(3)/mcL    Neut # 9.39 (H) 2.1 - 9.2 x10(3)/mcL    Mono # 1.00 0.1 - 1.3 x10(3)/mcL    Eos # 0.02 0 - 0.9 x10(3)/mcL    Baso # 0.04 <=0.2 x10(3)/mcL    IG# 0.05 (H) 0 - 0.04 x10(3)/mcL    IG% 0.4 %    NRBC% 0.0 %   POCT glucose    Collection Time: 03/29/24 10:41 AM   Result Value Ref Range    POCT Glucose 131 (H) 70 - 110 mg/dL   EKG 12-lead    Collection Time: 03/29/24  2:53 PM   Result Value Ref Range    QRS Duration 94 ms    [facility] QTC Calculation 427 ms   POCT glucose    Collection Time: 03/29/24  4:11 PM   Result Value Ref Range    POCT Glucose 152 (H) 70 - 110 mg/dL   EKG 12-lead    Collection Time: 03/29/24  7:02 PM   Result Value Ref Range    QRS Duration 90 ms    [facility] QTC Calculation 434 ms   POCT glucose    Collection  Time: 03/29/24  7:55 PM   Result Value Ref Range    POCT Glucose 115 (H) 70 - 110 mg/dL   Magnesium    Collection Time: 03/30/24  2:40 AM   Result Value Ref Range    Magnesium Level 1.90 1.60 - 2.60 mg/dL   Comprehensive Metabolic Panel    Collection Time: 03/30/24  2:40 AM   Result Value Ref Range    Sodium Level 135 (L) 136 - 145 mmol/L    Potassium Level 3.9 3.5 - 5.1 mmol/L    Chloride 92 (L) 98 - 107 mmol/L    Carbon Dioxide 32 (H) 23 - 31 mmol/L    Glucose Level 106 82 - 115 mg/dL    Blood Urea Nitrogen 27.2 (H) 8.4 - 25.7 mg/dL    Creatinine 0.68 (L) 0.73 - 1.18 mg/dL    Calcium Level Total 8.8 8.8 - 10.0 mg/dL    Protein Total 5.2 (L) 5.8 - 7.6 gm/dL    Albumin Level 2.7 (L) 3.4 - 4.8 g/dL    Globulin 2.5 2.4 - 3.5 gm/dL    Albumin/Globulin Ratio 1.1 1.1 - 2.0 ratio    Bilirubin Total 1.0 <=1.5 mg/dL    Alkaline Phosphatase 102 40 - 150 unit/L    Alanine Aminotransferase 24 0 - 55 unit/L    Aspartate Aminotransferase 40 (H) 5 - 34 unit/L    eGFR >60 mls/min/1.73/m2   CBC with Differential    Collection Time: 03/30/24  2:40 AM   Result Value Ref Range    WBC 9.07 4.50 - 11.50 x10(3)/mcL    RBC 3.34 (L) 4.70 - 6.10 x10(6)/mcL    Hgb 10.5 (L) 14.0 - 18.0 g/dL    Hct 31.1 (L) 42.0 - 52.0 %    MCV 93.1 80.0 - 94.0 fL    MCH 31.4 (H) 27.0 - 31.0 pg    MCHC 33.8 33.0 - 36.0 g/dL    RDW 13.2 11.5 - 17.0 %    Platelet 216 130 - 400 x10(3)/mcL    MPV 10.1 7.4 - 10.4 fL    Neut % 75.1 %    Lymph % 12.7 %    Mono % 10.4 %    Eos % 1.0 %    Basophil % 0.6 %    Lymph # 1.15 0.6 - 4.6 x10(3)/mcL    Neut # 6.82 2.1 - 9.2 x10(3)/mcL    Mono # 0.94 0.1 - 1.3 x10(3)/mcL    Eos # 0.09 0 - 0.9 x10(3)/mcL    Baso # 0.05 <=0.2 x10(3)/mcL    IG# 0.02 0 - 0.04 x10(3)/mcL    IG% 0.2 %    NRBC% 0.0 %   POCT glucose    Collection Time: 03/30/24  8:20 PM   Result Value Ref Range    POCT Glucose 128 (H) 70 - 110 mg/dL   Comprehensive Metabolic Panel    Collection Time: 03/31/24  3:38 AM   Result Value Ref Range    Sodium Level 135 (L)  136 - 145 mmol/L    Potassium Level 3.7 3.5 - 5.1 mmol/L    Chloride 92 (L) 98 - 107 mmol/L    Carbon Dioxide 35 (H) 23 - 31 mmol/L    Glucose Level 112 82 - 115 mg/dL    Blood Urea Nitrogen 21.8 8.4 - 25.7 mg/dL    Creatinine 0.68 (L) 0.73 - 1.18 mg/dL    Calcium Level Total 9.2 8.8 - 10.0 mg/dL    Protein Total 5.9 5.8 - 7.6 gm/dL    Albumin Level 2.6 (L) 3.4 - 4.8 g/dL    Globulin 3.3 2.4 - 3.5 gm/dL    Albumin/Globulin Ratio 0.8 (L) 1.1 - 2.0 ratio    Bilirubin Total 0.9 <=1.5 mg/dL    Alkaline Phosphatase 124 40 - 150 unit/L    Alanine Aminotransferase 36 0 - 55 unit/L    Aspartate Aminotransferase 40 (H) 5 - 34 unit/L    eGFR >60 mls/min/1.73/m2   Magnesium    Collection Time: 03/31/24  3:38 AM   Result Value Ref Range    Magnesium Level 2.10 1.60 - 2.60 mg/dL   CBC with Differential    Collection Time: 03/31/24  3:38 AM   Result Value Ref Range    WBC 8.83 4.50 - 11.50 x10(3)/mcL    RBC 3.39 (L) 4.70 - 6.10 x10(6)/mcL    Hgb 10.5 (L) 14.0 - 18.0 g/dL    Hct 30.7 (L) 42.0 - 52.0 %    MCV 90.6 80.0 - 94.0 fL    MCH 31.0 27.0 - 31.0 pg    MCHC 34.2 33.0 - 36.0 g/dL    RDW 13.2 11.5 - 17.0 %    Platelet 257 130 - 400 x10(3)/mcL    MPV 10.2 7.4 - 10.4 fL    Neut % 73.0 %    Lymph % 11.9 %    Mono % 11.9 %    Eos % 1.8 %    Basophil % 0.7 %    Lymph # 1.05 0.6 - 4.6 x10(3)/mcL    Neut # 6.45 2.1 - 9.2 x10(3)/mcL    Mono # 1.05 0.1 - 1.3 x10(3)/mcL    Eos # 0.16 0 - 0.9 x10(3)/mcL    Baso # 0.06 <=0.2 x10(3)/mcL    IG# 0.06 (H) 0 - 0.04 x10(3)/mcL    IG% 0.7 %    NRBC% 0.0 %     Telemetry:  Sinus Rhythm     Physical Exam  Vitals and nursing note reviewed.   Constitutional:       General: He is not in acute distress.     Appearance: Normal appearance.   HENT:      Head: Normocephalic.      Nose: Nose normal.      Mouth/Throat:      Mouth: Mucous membranes are moist.      Pharynx: Oropharynx is clear.   Eyes:      Extraocular Movements: Extraocular movements intact.   Cardiovascular:      Rate and Rhythm: Normal rate  and regular rhythm.      Pulses: Normal pulses.      Heart sounds: Normal heart sounds.   Pulmonary:      Effort: Pulmonary effort is normal. No respiratory distress.      Breath sounds: Normal breath sounds.      Comments: 2 L NC  Abdominal:      General: There is no distension.      Palpations: Abdomen is soft.      Tenderness: There is no abdominal tenderness. There is no guarding.   Genitourinary:     Comments: Celeste catheter   Musculoskeletal:         General: Normal range of motion.      Cervical back: Neck supple.      Right lower leg: No edema.      Left lower leg: No edema.   Skin:     General: Skin is warm and dry.      Comments: Mid Sternal Incision c/d/i   Neurological:      General: No focal deficit present.      Mental Status: He is alert and oriented to person, place, and time. Mental status is at baseline.      Motor: No weakness.   Psychiatric:         Mood and Affect: Mood normal.         Behavior: Behavior normal.         Judgment: Judgment normal.       Current Inpatient Medications:    Current Facility-Administered Medications:     acetaminophen oral solution 650 mg, 650 mg, Per OG tube, Q6H PRN, Yoel Posada PA, 650 mg at 03/28/24 1008    albumin human 5% bottle 12.5 g, 12.5 g, Intravenous, PRN, Yoel Posada PA, Stopped at 03/25/24 1704    aluminum-magnesium hydroxide-simethicone 200-200-20 mg/5 mL suspension 30 mL, 30 mL, Oral, Q4H PRN, Monse Lima MD, 30 mL at 03/30/24 1903    [START ON 4/3/2024] amiodarone tablet 200 mg, 200 mg, Oral, BID, Rachelle Peterson FNP    [START ON 4/6/2024] amiodarone tablet 200 mg, 200 mg, Oral, Daily, Rachelle Peterson FNP    amiodarone tablet 400 mg, 400 mg, Oral, BID, Rachelle Peterson FNP, 400 mg at 03/31/24 2058    amLODIPine tablet 5 mg, 5 mg, Oral, Daily, Chetna Rivera FNP, 5 mg at 03/31/24 0800    apixaban tablet 5 mg, 5 mg, Oral, BID, Rachelle Peterson FNP, 5 mg at 03/31/24 2058    aspirin EC tablet 81 mg, 81 mg, Oral,  Daily, Yoel Posada P, PA, 81 mg at 03/31/24 0800    atorvastatin tablet 40 mg, 40 mg, Oral, Daily, Brock Young, FNP, 40 mg at 03/31/24 0800    calcium gluconate 1 g in NS IVPB (premixed), 1 g, Intravenous, PRN, Swetha, Yoel P, PA    calcium gluconate 1 g in NS IVPB (premixed), 2 g, Intravenous, PRN, Avinashs, Yoel P, PA    calcium gluconate 1 g in NS IVPB (premixed), 3 g, Intravenous, PRN, Yoel Posada P, PA    diazePAM tablet 10 mg, 10 mg, Oral, On Call Procedure, Monse Lima MD, 10 mg at 03/21/24 1021    diphenhydrAMINE capsule 50 mg, 50 mg, Oral, On Call Procedure, Monse Lima MD, 50 mg at 03/21/24 1021    docusate sodium capsule 100 mg, 100 mg, Oral, BID, Yoel Posada P, PA, 100 mg at 03/31/24 2058    folic acid tablet 1 mg, 1 mg, Oral, Daily, Yoel Posada P, PA, 1 mg at 03/31/24 0800    heparin, porcine (PF) 100 unit/mL injection flush 500 Units, 5 mL, Intravenous, On Call Procedure, Reji Sullivan MD    hydrALAZINE injection 10 mg, 10 mg, Intravenous, Q4H PRN, Chetna Rivera, FNP, 10 mg at 03/22/24 1903    HYDROcodone-acetaminophen 5-325 mg per tablet 1 tablet, 1 tablet, Oral, Q4H PRN, Yoel Posada P, PA    lactulose 10 gram/15 ml solution 20 g, 20 g, Oral, Q6H PRN, Yoel Posada P, PA    LIDOcaine HCL 20 mg/ml (2%) injection 2 mL, 2 mL, Intradermal, Once, Reji Sullivan MD    loperamide capsule 2 mg, 2 mg, Oral, Continuous PRN, Yoel Posada P, PA    magnesium sulfate 2g in water 50mL IVPB (premix), 2 g, Intravenous, PRN, Yoel Posada PA, Stopped at 03/26/24 0644    magnesium sulfate 2g in water 50mL IVPB (premix), 4 g, Intravenous, PRN, Yoel Posada PA    metoclopramide injection 5 mg, 5 mg, Intravenous, Q6H PRN, Yoel Posada PA    metoprolol succinate (TOPROL-XL) 24 hr split tablet 12.5 mg, 12.5 mg, Oral, Daily, Chetna Rivera, LEXIIP, 12.5 mg at 03/31/24 0800    morphine injection 2 mg, 2 mg, Intravenous, Q4H PRN, Opal  Karla, LEXIIP, 2 mg at 03/25/24 2020    morphine injection 4 mg, 4 mg, Intravenous, Q4H PRN, Yoel Posada, PA    mupirocin 2 % ointment, , Nasal, On Call Procedure, Reji Sullivan MD    nitroGLYCERIN SL tablet 0.4 mg, 0.4 mg, Sublingual, Q5 Min PRN, Chetna Rivera, BOUBACAR, 0.4 mg at 03/22/24 1900    ondansetron injection 4 mg, 4 mg, Intravenous, Q12H PRN, Brock Young, FNP    ondansetron injection 4 mg, 4 mg, Intravenous, Q4H PRN, Yoel Posada P, PA, 4 mg at 03/27/24 0224    oxyCODONE immediate release tablet Tab 10 mg, 10 mg, Oral, Q4H PRN, Yoel Posada P, PA, 10 mg at 04/01/24 0610    pantoprazole EC tablet 40 mg, 40 mg, Oral, Daily, Chetna Rivera, FNP, 40 mg at 03/31/24 0800    potassium chloride 20 mEq in 100 mL IVPB (FOR CENTRAL LINE ADMINISTRATION ONLY), 20 mEq, Intravenous, PRN, Yoel Posada P, PA, Last Rate: 50 mL/hr at 03/25/24 1817, 20 mEq at 03/25/24 1817    potassium chloride 20 mEq in 100 mL IVPB (FOR CENTRAL LINE ADMINISTRATION ONLY), 40 mEq, Intravenous, PRN, Yoel Posada P, PA    potassium chloride 20 mEq in 100 mL IVPB (FOR CENTRAL LINE ADMINISTRATION ONLY), 20 mEq, Intravenous, PRN, Swetha Yoel P, PA    sodium chloride 0.9% flush 10 mL, 10 mL, Intravenous, PRN, Monse Lima MD    sodium phosphate 15 mmol in dextrose 5 % (D5W) 250 mL IVPB, 15 mmol, Intravenous, PRN, Yoel Posada P, PA    sucralfate tablet 1 g, 1 g, Oral, QID (AC & HS), Yoel Posada P, PA, 1 g at 03/31/24 2058  VTE Risk Mitigation (From admission, onward)           Ordered     apixaban tablet 5 mg  2 times daily         03/30/24 1247     Place IVANA hose  Until discontinued         03/25/24 1136     heparin, porcine (PF) 100 unit/mL injection flush 500 Units  On Call Procedure         03/24/24 7677                  Assessment/Plan:   Assessment:   CAD (Multivessel)- Status Post CABG (3.25.24) LIMA to LAD, SVG to OM, SVG to Distal RCA (HANK Ligation)    - LM: 90% Distal, LAD: 70% Ostial, LCX:  40-50% Proximal, RCA: (Dominant) 80-90% Serially Calcified Lesions (EF 50%) (3.21.24)  Newly Diagnosed Afib CVR - now SR    - CHADSVASC Score 3 Points   Hypertension  PAD    - REIA 70-80% Stenosis at Pelvic Brim (3.21.24)  Elevated Liver Enzymes   Nicotine Dependence/Chronic Tobacco Use  ROBBI    - 50-69% MUSTAPHA (CUS 3.21.24)  Anemia  Thrombocytopenia - Resolved     Impression:  Continue Aspirin 81 Mg Daily & Statin   Continue Toprol XL 12.5 Mg Daily  & Norvasc 5 Mg Daily   Continue PO amio load: amio 400 mg BID x 3 days, then amio 200 mg BID x 3 days, then amio 200 mg daily.   Continue Eliquis for CVA prophylaxis in the setting of PAF.   Advance Mobilization as Able and Continue Regular IS Usage  Ensure accurate I&O's and daily weights.   Celeste remains in place. Will reach out to urology to see if there are any other recs.   CM on board for rehab placement.   Labs in AM: CBC, CMP, & Mg.     BOUBACAR Harman  Cardiology  [facility] - 6th Floor Medical Telemetry  04/01/2024

## 2024-04-01 NOTE — PLAN OF CARE
04/01/24 1112   Discharge Reassessment   Assessment Type Discharge Planning Reassessment   Discharge Plan A Rehab   Discharge Plan B Skilled Nursing Facility   Post-Acute Status   Post-Acute Authorization Placement     [facility] will submit to insurance for authorization.

## 2024-04-01 NOTE — PLAN OF CARE
04/01/24 1216   Medicare Message   Important Message from Medicare regarding Discharge Appeal Rights Given to patient/caregiver;Explained to patient/caregiver

## 2024-04-01 NOTE — PT/OT/SLP PROGRESS
Physical Therapy Treatment    Patient Name:  Ata Pickens   MRN:  [id number]    Recommendations:     Discharge therapy intensity: High Intensity Therapy   Discharge Equipment Recommendations:  (TBD)  Barriers to discharge: Ongoing medical needs and Placement    Assessment:     Ata Pickens is a 68 y.o. male admitted with a medical diagnosis of  s/p CABG .  He presents with the following impairments/functional limitations: weakness, impaired endurance, impaired self care skills, impaired functional mobility, gait instability, impaired balance, pain .    Pt octavio tx fairly but needs improvements with endurance and maintaining sternal pxns. Pt refused gait belt 2/2 CT soreness.    Rehab Prognosis: Good; patient would benefit from acute skilled PT services to address these deficits and reach maximum level of function.    Recent Surgery: Procedure(s) (LRB):  CORONARY ARTERY BYPASS GRAFT (CABG) (N/A)  SURGICAL PROCUREMENT, VEIN, ENDOSCOPIC (Left)  EXCLUSION, LEFT ATRIAL APPENDAGE (N/A) 7 Days Post-Op    Plan:     During this hospitalization, patient to be seen 6 x/week to address the identified rehab impairments via gait training, therapeutic activities, therapeutic exercises and progress toward the following goals:    Plan of Care Expires:  05/04/24    Subjective     Chief Complaint: fatigue  Patient/Family Comments/goals:   Pain/Comfort:         Objective:     Communicated with RN prior to session.  Patient found up in chair with   upon PT entry to room.     General Precautions: Standard, sternal  Orthopedic Precautions: N/A  Braces: N/A  Respiratory Status: Nasal cannula, flow 2 L/min  O2: 89-98% with ax  Skin Integrity: Visible skin intact      Functional Mobility:  Transfers:     Sit to Stand:  minimum assistance with rolling walker  Gait: Pt amb 50ft x1, 15 ftx2 with RW Gaby. Slow molly with step through gait. Seated rest break needed    Therapeutic Activity:  Performed 3 sit<>stands throughout session Gaby with  Vcs for hand and foot placement      Education:  Patient provided with verbal education education regarding PT role/goals/POC, post-op precautions, fall prevention, and safety awareness.  Understanding was verbalized, however additional teaching warranted.     Patient left up in chair with all lines intact, call button in reach, geomat cushion, RN notified, and grandson present    GOALS:   Multidisciplinary Problems       Physical Therapy Goals          Problem: Physical Therapy    Goal Priority Disciplines Outcome Goal Variances Interventions   Physical Therapy Goal     PT, PT/OT Ongoing, Progressing     Description: Goals to be met by: d/c     Patient will increase functional independence with mobility by performin. Supine to sit with Stand-by Assistance  2. Sit to supine with Stand-by Assistance  3. Sit to stand transfer with Stand-by Assistance  4. Gait  x 150 feet with Stand-by Assistance using Least Restrictive Assistive Device.   5. Ascend/descend 2 stair with unilateral railing Stand-by Assistance using No Assistive Device.                          Time Tracking:     PT Received On: 24  PT Start Time: 1421     PT Stop Time: 1444  PT Total Time (min): 23 min     Billable Minutes: Gait Training 15 and Therapeutic Activity 8    Treatment Type: Treatment  PT/PTA: PTA     Number of PTA visits since last PT visit: 3     2024

## 2024-04-02 LAB
ALBUMIN SERPL-MCNC: 2.7 G/DL (ref 3.4–4.8)
ALBUMIN/GLOB SERPL: 1 RATIO (ref 1.1–2)
ALP SERPL-CCNC: 119 UNIT/L (ref 40–150)
ALT SERPL-CCNC: 42 UNIT/L (ref 0–55)
AST SERPL-CCNC: 35 UNIT/L (ref 5–34)
BASOPHILS # BLD AUTO: 0.07 X10(3)/MCL
BASOPHILS NFR BLD AUTO: 0.8 %
BILIRUB SERPL-MCNC: 1.1 MG/DL
BUN SERPL-MCNC: 19.9 MG/DL (ref 8.4–25.7)
CALCIUM SERPL-MCNC: 8.8 MG/DL (ref 8.8–10)
CHLORIDE SERPL-SCNC: 93 MMOL/L (ref 98–107)
CO2 SERPL-SCNC: 33 MMOL/L (ref 23–31)
CREAT SERPL-MCNC: 0.71 MG/DL (ref 0.73–1.18)
EOSINOPHIL # BLD AUTO: 0.11 X10(3)/MCL (ref 0–0.9)
EOSINOPHIL NFR BLD AUTO: 1.3 %
ERYTHROCYTE [DISTWIDTH] IN BLOOD BY AUTOMATED COUNT: 13 % (ref 11.5–17)
GFR SERPLBLD CREATININE-BSD FMLA CKD-EPI: >60 MLS/MIN/1.73/M2
GLOBULIN SER-MCNC: 2.7 GM/DL (ref 2.4–3.5)
GLUCOSE SERPL-MCNC: 115 MG/DL (ref 82–115)
HCT VFR BLD AUTO: 31.5 % (ref 42–52)
HGB BLD-MCNC: 10.9 G/DL (ref 14–18)
IMM GRANULOCYTES # BLD AUTO: 0.04 X10(3)/MCL (ref 0–0.04)
IMM GRANULOCYTES NFR BLD AUTO: 0.5 %
LYMPHOCYTES # BLD AUTO: 0.78 X10(3)/MCL (ref 0.6–4.6)
LYMPHOCYTES NFR BLD AUTO: 9.2 %
MAGNESIUM SERPL-MCNC: 2 MG/DL (ref 1.6–2.6)
MCH RBC QN AUTO: 31.8 PG (ref 27–31)
MCHC RBC AUTO-ENTMCNC: 34.6 G/DL (ref 33–36)
MCV RBC AUTO: 91.8 FL (ref 80–94)
MONOCYTES # BLD AUTO: 0.75 X10(3)/MCL (ref 0.1–1.3)
MONOCYTES NFR BLD AUTO: 8.9 %
NEUTROPHILS # BLD AUTO: 6.72 X10(3)/MCL (ref 2.1–9.2)
NEUTROPHILS NFR BLD AUTO: 79.3 %
NRBC BLD AUTO-RTO: 0 %
OHS QRS DURATION: 86 MS
OHS QTC CALCULATION: 471 MS
PLATELET # BLD AUTO: 360 X10(3)/MCL (ref 130–400)
PMV BLD AUTO: 9.5 FL (ref 7.4–10.4)
POTASSIUM SERPL-SCNC: 4.2 MMOL/L (ref 3.5–5.1)
PROT SERPL-MCNC: 5.4 GM/DL (ref 5.8–7.6)
RBC # BLD AUTO: 3.43 X10(6)/MCL (ref 4.7–6.1)
SODIUM SERPL-SCNC: 135 MMOL/L (ref 136–145)
WBC # SPEC AUTO: 8.47 X10(3)/MCL (ref 4.5–11.5)

## 2024-04-02 PROCEDURE — 94760 N-INVAS EAR/PLS OXIMETRY 1: CPT

## 2024-04-02 PROCEDURE — 25000003 PHARM REV CODE 250: Performed by: PHYSICIAN ASSISTANT

## 2024-04-02 PROCEDURE — 85025 COMPLETE CBC W/AUTO DIFF WBC: CPT

## 2024-04-02 PROCEDURE — 25000003 PHARM REV CODE 250: Performed by: NURSE PRACTITIONER

## 2024-04-02 PROCEDURE — 97530 THERAPEUTIC ACTIVITIES: CPT

## 2024-04-02 PROCEDURE — 94640 AIRWAY INHALATION TREATMENT: CPT

## 2024-04-02 PROCEDURE — 83735 ASSAY OF MAGNESIUM: CPT

## 2024-04-02 PROCEDURE — 25000003 PHARM REV CODE 250: Performed by: INTERNAL MEDICINE

## 2024-04-02 PROCEDURE — 25000242 PHARM REV CODE 250 ALT 637 W/ HCPCS: Performed by: NURSE PRACTITIONER

## 2024-04-02 PROCEDURE — 80053 COMPREHEN METABOLIC PANEL: CPT

## 2024-04-02 PROCEDURE — 93005 ELECTROCARDIOGRAM TRACING: CPT

## 2024-04-02 PROCEDURE — 99900035 HC TECH TIME PER 15 MIN (STAT)

## 2024-04-02 PROCEDURE — 21400001 HC TELEMETRY ROOM

## 2024-04-02 PROCEDURE — 25000003 PHARM REV CODE 250

## 2024-04-02 PROCEDURE — 97535 SELF CARE MNGMENT TRAINING: CPT

## 2024-04-02 PROCEDURE — 27000221 HC OXYGEN, UP TO 24 HOURS

## 2024-04-02 PROCEDURE — 93010 ELECTROCARDIOGRAM REPORT: CPT | Mod: ,,, | Performed by: INTERNAL MEDICINE

## 2024-04-02 RX ORDER — IPRATROPIUM BROMIDE AND ALBUTEROL SULFATE 2.5; .5 MG/3ML; MG/3ML
3 SOLUTION RESPIRATORY (INHALATION) EVERY 4 HOURS PRN
Status: DISCONTINUED | OUTPATIENT
Start: 2024-04-02 | End: 2024-04-05 | Stop reason: HOSPADM

## 2024-04-02 RX ADMIN — APIXABAN 5 MG: 5 TABLET, FILM COATED ORAL at 09:04

## 2024-04-02 RX ADMIN — METOPROLOL SUCCINATE 12.5 MG: 25 TABLET, EXTENDED RELEASE ORAL at 09:04

## 2024-04-02 RX ADMIN — OXYCODONE HYDROCHLORIDE 10 MG: 5 TABLET ORAL at 11:04

## 2024-04-02 RX ADMIN — IPRATROPIUM BROMIDE AND ALBUTEROL SULFATE 3 ML: .5; 3 SOLUTION RESPIRATORY (INHALATION) at 03:04

## 2024-04-02 RX ADMIN — OXYCODONE HYDROCHLORIDE 10 MG: 5 TABLET ORAL at 02:04

## 2024-04-02 RX ADMIN — AMLODIPINE BESYLATE 5 MG: 5 TABLET ORAL at 09:04

## 2024-04-02 RX ADMIN — APIXABAN 5 MG: 5 TABLET, FILM COATED ORAL at 08:04

## 2024-04-02 RX ADMIN — DOCUSATE SODIUM 100 MG: 50 CAPSULE, LIQUID FILLED ORAL at 09:04

## 2024-04-02 RX ADMIN — OXYCODONE HYDROCHLORIDE 10 MG: 5 TABLET ORAL at 10:04

## 2024-04-02 RX ADMIN — FOLIC ACID 1 MG: 1 TABLET ORAL at 09:04

## 2024-04-02 RX ADMIN — ALUMINUM HYDROXIDE, MAGNESIUM HYDROXIDE, AND DIMETHICONE 30 ML: 200; 20; 200 SUSPENSION ORAL at 12:04

## 2024-04-02 RX ADMIN — ATORVASTATIN CALCIUM 40 MG: 40 TABLET, FILM COATED ORAL at 09:04

## 2024-04-02 RX ADMIN — ASPIRIN 81 MG: 81 TABLET, COATED ORAL at 09:04

## 2024-04-02 RX ADMIN — NITROGLYCERIN 0.4 MG: 0.4 TABLET, ORALLY DISINTEGRATING SUBLINGUAL at 01:04

## 2024-04-02 RX ADMIN — POLYETHYLENE GLYCOL 3350 17 G: 17 POWDER, FOR SOLUTION ORAL at 09:04

## 2024-04-02 RX ADMIN — DOCUSATE SODIUM 100 MG: 50 CAPSULE, LIQUID FILLED ORAL at 08:04

## 2024-04-02 RX ADMIN — AMIODARONE HYDROCHLORIDE 400 MG: 200 TABLET ORAL at 09:04

## 2024-04-02 RX ADMIN — OXYCODONE HYDROCHLORIDE 10 MG: 5 TABLET ORAL at 06:04

## 2024-04-02 RX ADMIN — AMIODARONE HYDROCHLORIDE 400 MG: 200 TABLET ORAL at 08:04

## 2024-04-02 RX ADMIN — DIPHENHYDRAMINE HYDROCHLORIDE 25 MG: 25 CAPSULE ORAL at 02:04

## 2024-04-02 RX ADMIN — PANTOPRAZOLE SODIUM 40 MG: 40 TABLET, DELAYED RELEASE ORAL at 09:04

## 2024-04-02 RX ADMIN — SUCRALFATE 1 G: 1 TABLET ORAL at 10:04

## 2024-04-02 NOTE — PT/OT/SLP PROGRESS
Occupational Therapy   Treatment    Name: Ata Pickens  MRN: [id number]  Admitting Diagnosis:  CABG eval 8 Days Post-Op    Recommendations:     Recommended therapy intensity at discharge: High Intensity Therapy   Discharge Equipment Recommendations:  bath bench  Barriers to discharge:  Other (Comment) (ongoing medical needs)    Assessment:     Ata Pickens is a 68 y.o. male with a medical diagnosis of CAD s/p CABG x3 and peripheral vascular disease. He presents with flat affect but agreeable to participate in OT session. He tolerated OT session fairly, but continues to present with decreased activity tolerance. Performance deficits affecting function are weakness, impaired endurance, impaired self care skills, impaired functional mobility, orthopedic precautions, pain, decreased safety awareness, impaired cardiopulmonary response to activity, decreased upper extremity function. He required min A for sit<>stand transfers and CGA for functional mobility within room using rolling walker. Pt refused gait belt due to CT soreness. Pt required verbal cues for adherence to sternal precautions when completing transfers.     Rehab Prognosis:  Good; patient would benefit from acute skilled OT services to address these deficits and reach maximum level of function.       Plan:     Patient to be seen 4 x/week to address the above listed problems via self-care/home management, therapeutic activities, therapeutic exercises  Plan of Care Expires: 04/25/24  Plan of Care Reviewed with: patient, family    Subjective     Pain/Comfort:  Pain Rating 1: 0/10    Objective:     Communicated with: RN prior to session.  Patient found up in chair with telemetry, pulse ox (continuous), still catheter, oxygen, peripheral IV upon OT entry to room.    General Precautions: Standard, sternal    Orthopedic Precautions:N/A  Braces: N/A  Respiratory Status: Nasal cannula, flow 2 L/min  Vital Signs: HR: 109-112     Occupational Performance:     Bed  Mobility:    Pt up in chair upon OT entry.      Functional Mobility/Transfers:  Patient completed Sit <> Stand Transfer with minimum assistance  with  rolling walker and cardiac bear.    Patient completed Toilet Transfer Step Transfer technique with minimum assistance with  rolling walker and cardiac bear.   Functional Mobility: pt required CGA for functional mobility from recliner to toilet using rolling walker.     Activities of Daily Living:  Toileting: minimum assistance to complete toilet transfer using rolling walker and cardiac bear.     Therapeutic Positioning    OT interventions performed during the course of today's session in an effort to prevent and/or reduce acquired pressure injuries:   Therapeutic positioning was provided at the conclusion of session to offload all bony prominences for the prevention and/or reduction of pressure injuries    Skin assessment: all bony prominences were assessed    Findings: known area of altered skin integrity at sternal incision.     [facility] 6 Click ADL: 14    Patient Education:  Patient and family were provided with verbal education education regarding OT role/goals/POC, post op precautions, fall prevention, safety awareness, Discharge/DME recommendations, and pressure ulcer prevention.  Understanding was verbalized.      Patient left up in chair with all lines intact, call button in reach, geomat cushion, RN notified, and family present.    GOALS:   Multidisciplinary Problems       Occupational Therapy Goals          Problem: Occupational Therapy    Goal Priority Disciplines Outcome Interventions   Occupational Therapy Goal     OT, PT/OT Ongoing, Progressing    Description: LTG: Pt will perform basic ADLs and ADL transfers with Modified independence using LRAD by discharge.    STG: to be met by 4/25/24    Pt will complete grooming standing at sink with LRAD with SBA.  Pt will complete UB dressing with SBA.  Pt will complete LB dressing with SBA using LRAD and AE  prn.  Pt will complete toileting with SBA using LRAD.  Pt will complete functional mobility to/from toilet and toilet transfer with SBA using LRAD.                        Time Tracking:     OT Date of Treatment: 04/02/24  OT Start Time: 1135  OT Stop Time: 1149  OT Total Time (min): 14 min    Billable Minutes:Self Care/Home Management 14 minutes.     OT/MARY ANNE: OT     Number of MARY ANNE visits since last OT visit: 2    4/2/2024

## 2024-04-02 NOTE — PLAN OF CARE
Problem: Adult Inpatient Plan of Care  Goal: Plan of Care Review  Outcome: Ongoing, Progressing  Goal: Patient-Specific Goal (Individualized)  Outcome: Ongoing, Progressing  Goal: Absence of Hospital-Acquired Illness or Injury  Outcome: Ongoing, Progressing  Goal: Optimal Comfort and Wellbeing  Outcome: Ongoing, Progressing  Goal: Readiness for Transition of Care  Outcome: Ongoing, Progressing     Problem: Impaired Wound Healing  Goal: Optimal Wound Healing  Outcome: Ongoing, Progressing     Problem: Infection  Goal: Absence of Infection Signs and Symptoms  Outcome: Ongoing, Progressing     Problem: Skin Injury Risk Increased  Goal: Skin Health and Integrity  Outcome: Ongoing, Progressing     Problem: Fall Injury Risk  Goal: Absence of Fall and Fall-Related Injury  Outcome: Ongoing, Progressing     Problem: Activity Intolerance (Cardiovascular Surgery)  Goal: Improved Activity Tolerance  Outcome: Ongoing, Progressing     Problem: Adjustment to Surgery (Cardiovascular Surgery)  Goal: Optimal Coping with Heart Surgery  Outcome: Ongoing, Progressing

## 2024-04-02 NOTE — PROGRESS NOTES
"  [facility] - 6th Floor Medical Telemetry    Cardiology  Progress Note    Patient Name: Ata Pickens  MRN: [id number]  Admission Date: 3/21/2024  Hospital Length of Stay: 12 days  Code Status: Full Code   Attending Physician: Monse Lima MD   Primary Care Physician: Lesa Stephen FNP  Expected Discharge Date:   Principal Problem:<principal problem not specified>    Subjective:   Chief Complaint:  Outpatient Cath- MV CAD- CABG      HPI:   Mr. Pickens is a 68 year old male, known to Dr. Lima, who presented to the hospital and underwent elective coronary angiogram due to diagnosis of abnormal stress test which was performed in the outpatient setting due to reported history of chest pain, SOB, and fatigue with minimal exertion. He underwent cath on 3.21.24 revealing MV CAD including 90% distal LM/Ostial LAD Disease. Patient's EF on LV Gram noted to be 50%. Patient was admitted to [facility]. CT Surgical services consulted for CABG evaluation.    Hospital Course:  3.22.24: NAD Noted. SB on Tele. BP Stable. CP This AM, Went away on its own. No CP Currently. Right Radial Site soft.  3.23.24: NAD Noted. SR on Tele. BP Stable. On Heparin Infusion.  3.24.24: NAD Noted. Vitals Stable. SR on Tele.  3.25.24: NAD Noted. CABG Today. Tolerated well. SR on Tele. On Cleviprex Infusion. CO/CI 4.9/3.3.   3.26.24: NAD Noted. Progressing well. SR on Tele. Not requiring Pressors.  3.27.24: NAD Noted. BP Stable, some intermittent hypertension. SR on Tele. Progressing well.   3.28.24: NAD Noted. Progressing well. CT Out today. SR on Tele. Some Post Op Incisional discomfort but overall doing well.   3.29.24: NAD. SR on tele. + Incisional CP. On 3 L NC. Daily net negative 1048 mL/24 hours. Labs pending. "I feel okay."   3.30.24: NAD. SR on tele. + Incisional CP. Denies SOB or palps. "I'm feeling fine." BP stable.   3.31.24: NAD.  SR on tele. Denies SOB or palps. Requiring 3 L NC. + Incisional CP. BP " stable.   4.1.24: NAD. SR on tele. BP stable. 2 L NC. + Incisional CP. Denies SOB or palps. BP stable. SR on tele. Celeste catheter in place.   4.2.24: NAD. SR on tele. BP stable. 2 L NC. Denies SOB or palps. Remains with weakness. Celeste catheter in place.     PMH: Hypertension, Claudication, SOB, CP, Chronic Tobacco Use  PSH: Wrist Surgery, Colostomy Closure  Family History: Father- Lung Cancer, Mother- Lung Cancer, Brother- Cardiac Pacemaker  Social History: Tobacco- Active Smoker, Alcohol- Negative, Substance Abuse- Negative     Previous Cardiac Diagnostics:   CABG (3.25.24):  Procedure:  Coronary artery bypass grafting X 3 ,   Left internal mammary artery to the LAD   Saphenous venous graft to   OM  Saphenous venous graft to    distal RCA  Ligation of left atrial appendage with a 35 mm atrial clip  Endoscopic venous harvesting left greater saphenous vein    Coronary Angiogram (3.21.24):  Coronary findings:  Dominance: right   Left main:  Calcified distal 90% stenosis extending ostium of the LAD.    Left anterior descending artery:  Type three-vessel with calcified ostial stenosis of at least 70%.    Circumflex artery:  Nondominant calcified vessel with proximal 40-50% stenosis.  The circumflex gives rise to a large patent bifurcating OM.  Right coronary artery:  Calcified dominant vessel with serial heavily calcified segment stenosis of up to 80-90%.  The RCA terminates in a moderate bifurcating PDA and a small PL segment.    Selective LIMA angiogram:  Large widely patent vessel  Aortic root angiogram:  Calcified Type 3 bovine arch with patent innominate and proximal ICA bilateral.  Abdominal angiogram: No AAA.  Left iliac system patent.  Left common femoral artery patent proximally.  Right common and internal iliac patent.  Right external iliac with 70-80% stenosis at the pelvic brim.  Right common femoral occluded.  Left ventriculography:  EF- 50%.    Hemodynamics:LV/AO= 0 mmHg                      LVEDP= 13-16  mmHg       Carotid US (3.21.24):  The right internal carotid artery demonstrated 50-69% stenosis.   The left internal carotid artery demonstrated less than 50% stenosis.  Bilateral vertebral arteries were patent with antegrade flow.     Echocardiogram (3.5.24):  The left ventricle is decreased in size. Global left ventricular systolic function is borderline normal. The left ventricular ejection fraction is 50%. The left ventricle diastolic function is impaired (Grade I) with normal left atrial pressure.   The right atrium is moderately enlarged ~5.1 cm.  Mild (1+) tricuspid and trace mitral regurgitation.  The pulmonary artery systolic pressure is 23 mmHg.  The study quality is below average.      CV US Arterial Lower Extremities (3.5.24):  The study quality is good.   The right proximal superficial femoral artery is occluded.   The right mid and distal superficial femoral, posterior tibial, peroneal, anterior tibial, and dorsalis pedis arteries exhibit poor, diminished perfusion via collateral flow.  The right lower extremity arteries exhibit mono-phasic waveforms.  The left external iliac and common femoral arteries exhibit mono-phasic waveforms.  The remaining arteries of the left lower extremity exhibit bi-phasic waveforms.      PET (3.1.24):  This is an abnormal perfusion study. Study is consistent with significant anterior  ischemia.   This scan is suggestive of moderate to high risk for future cardiovascular events.   Large partially reversible perfusion abnormality of severe intensity in the anterior apical region. Large partially reversible perfusion abnormality of severe intensity in the anterior septal region. Small reversible perfusion abnormality of severe intensity in the apical lateral segment.   The left ventricular cavity is noted to be normal on the stress studies. The stress left ventricular ejection fraction was calculated to be 37% and left ventricular global function is moderately reduced. The  rest left ventricular cavity is noted to be normal. The rest left ventricular ejection fraction was calculated to be 52% and rest left ventricular global function is normal.   Hypokinesis of apical anterior segment is noted only in the stress studies which is suggestive of new ischemia. Persistent hypokinesis of the septal region is noted in both rest and stress studies. When compared to the resting ejection fraction (52%), the stress ejection fraction (37%) has decreased.   Transient ischemic dilatation is present and has been described as a marker for high risk coronary artery disease. It has also been described in microvascular disease, hypertensive heart disease as well as cardiac deconditioning.   The study quality is good.   There was no rise in myocardial blood flow between rest and stress.  Global myocardial blood flow reserve was 0.83.  Myocardial blood flow reserve is globally abnormal, placing the patient at a higher coronary event risk.    Review of Systems   Constitutional: Positive for malaise/fatigue.   Cardiovascular:  Negative for chest pain.   Respiratory:  Negative for shortness of breath.    Musculoskeletal:         Post Op Incisional Pain     Objective:     Vital Signs (Most Recent):  Temp: 97.3 °F (36.3 °C) (04/02/24 1130)  Pulse: 75 (04/02/24 1130)  Resp: 18 (04/02/24 1130)  BP: 130/74 (04/02/24 1130)  SpO2: 96 % (04/02/24 1130) Vital Signs (24h Range):  Temp:  [97.3 °F (36.3 °C)-98.2 °F (36.8 °C)] 97.3 °F (36.3 °C)  Pulse:  [64-83] 75  Resp:  [18] 18  SpO2:  [93 %-97 %] 96 %  BP: (118-158)/(60-82) 130/74   Weight: 51 kg (112 lb 6.4 oz)  Body mass index is 19.29 kg/m².  SpO2: 96 %       Intake/Output Summary (Last 24 hours) at 4/2/2024 1255  Last data filed at 4/2/2024 0351  Gross per 24 hour   Intake 2280 ml   Output 925 ml   Net 1355 ml       Lines/Drains/Airways       Drain  Duration                  Urethral Catheter 03/25/24  18 Fr. 8 days              Peripheral Intravenous Line   Duration                  Peripheral IV - Single Lumen 03/27/24 0825 18 G Anterior;Left Upper Arm 6 days                  Significant Labs:   Recent Results (from the past 72 hour(s))   POCT glucose    Collection Time: 03/30/24  8:20 PM   Result Value Ref Range    POCT Glucose 128 (H) 70 - 110 mg/dL   Comprehensive Metabolic Panel    Collection Time: 03/31/24  3:38 AM   Result Value Ref Range    Sodium Level 135 (L) 136 - 145 mmol/L    Potassium Level 3.7 3.5 - 5.1 mmol/L    Chloride 92 (L) 98 - 107 mmol/L    Carbon Dioxide 35 (H) 23 - 31 mmol/L    Glucose Level 112 82 - 115 mg/dL    Blood Urea Nitrogen 21.8 8.4 - 25.7 mg/dL    Creatinine 0.68 (L) 0.73 - 1.18 mg/dL    Calcium Level Total 9.2 8.8 - 10.0 mg/dL    Protein Total 5.9 5.8 - 7.6 gm/dL    Albumin Level 2.6 (L) 3.4 - 4.8 g/dL    Globulin 3.3 2.4 - 3.5 gm/dL    Albumin/Globulin Ratio 0.8 (L) 1.1 - 2.0 ratio    Bilirubin Total 0.9 <=1.5 mg/dL    Alkaline Phosphatase 124 40 - 150 unit/L    Alanine Aminotransferase 36 0 - 55 unit/L    Aspartate Aminotransferase 40 (H) 5 - 34 unit/L    eGFR >60 mls/min/1.73/m2   Magnesium    Collection Time: 03/31/24  3:38 AM   Result Value Ref Range    Magnesium Level 2.10 1.60 - 2.60 mg/dL   CBC with Differential    Collection Time: 03/31/24  3:38 AM   Result Value Ref Range    WBC 8.83 4.50 - 11.50 x10(3)/mcL    RBC 3.39 (L) 4.70 - 6.10 x10(6)/mcL    Hgb 10.5 (L) 14.0 - 18.0 g/dL    Hct 30.7 (L) 42.0 - 52.0 %    MCV 90.6 80.0 - 94.0 fL    MCH 31.0 27.0 - 31.0 pg    MCHC 34.2 33.0 - 36.0 g/dL    RDW 13.2 11.5 - 17.0 %    Platelet 257 130 - 400 x10(3)/mcL    MPV 10.2 7.4 - 10.4 fL    Neut % 73.0 %    Lymph % 11.9 %    Mono % 11.9 %    Eos % 1.8 %    Basophil % 0.7 %    Lymph # 1.05 0.6 - 4.6 x10(3)/mcL    Neut # 6.45 2.1 - 9.2 x10(3)/mcL    Mono # 1.05 0.1 - 1.3 x10(3)/mcL    Eos # 0.16 0 - 0.9 x10(3)/mcL    Baso # 0.06 <=0.2 x10(3)/mcL    IG# 0.06 (H) 0 - 0.04 x10(3)/mcL    IG% 0.7 %    NRBC% 0.0 %   Comprehensive Metabolic  Panel    Collection Time: 04/01/24  9:00 AM   Result Value Ref Range    Sodium Level 136 136 - 145 mmol/L    Potassium Level 4.0 3.5 - 5.1 mmol/L    Chloride 90 (L) 98 - 107 mmol/L    Carbon Dioxide 34 (H) 23 - 31 mmol/L    Glucose Level 137 (H) 82 - 115 mg/dL    Blood Urea Nitrogen 19.1 8.4 - 25.7 mg/dL    Creatinine 0.77 0.73 - 1.18 mg/dL    Calcium Level Total 9.4 8.8 - 10.0 mg/dL    Protein Total 6.0 5.8 - 7.6 gm/dL    Albumin Level 3.0 (L) 3.4 - 4.8 g/dL    Globulin 3.0 2.4 - 3.5 gm/dL    Albumin/Globulin Ratio 1.0 (L) 1.1 - 2.0 ratio    Bilirubin Total 1.2 <=1.5 mg/dL    Alkaline Phosphatase 143 40 - 150 unit/L    Alanine Aminotransferase 48 0 - 55 unit/L    Aspartate Aminotransferase 45 (H) 5 - 34 unit/L    eGFR >60 mls/min/1.73/m2   Comprehensive Metabolic Panel    Collection Time: 04/02/24  4:24 AM   Result Value Ref Range    Sodium Level 135 (L) 136 - 145 mmol/L    Potassium Level 4.2 3.5 - 5.1 mmol/L    Chloride 93 (L) 98 - 107 mmol/L    Carbon Dioxide 33 (H) 23 - 31 mmol/L    Glucose Level 115 82 - 115 mg/dL    Blood Urea Nitrogen 19.9 8.4 - 25.7 mg/dL    Creatinine 0.71 (L) 0.73 - 1.18 mg/dL    Calcium Level Total 8.8 8.8 - 10.0 mg/dL    Protein Total 5.4 (L) 5.8 - 7.6 gm/dL    Albumin Level 2.7 (L) 3.4 - 4.8 g/dL    Globulin 2.7 2.4 - 3.5 gm/dL    Albumin/Globulin Ratio 1.0 (L) 1.1 - 2.0 ratio    Bilirubin Total 1.1 <=1.5 mg/dL    Alkaline Phosphatase 119 40 - 150 unit/L    Alanine Aminotransferase 42 0 - 55 unit/L    Aspartate Aminotransferase 35 (H) 5 - 34 unit/L    eGFR >60 mls/min/1.73/m2   Magnesium    Collection Time: 04/02/24  4:24 AM   Result Value Ref Range    Magnesium Level 2.00 1.60 - 2.60 mg/dL   CBC with Differential    Collection Time: 04/02/24  4:24 AM   Result Value Ref Range    WBC 8.47 4.50 - 11.50 x10(3)/mcL    RBC 3.43 (L) 4.70 - 6.10 x10(6)/mcL    Hgb 10.9 (L) 14.0 - 18.0 g/dL    Hct 31.5 (L) 42.0 - 52.0 %    MCV 91.8 80.0 - 94.0 fL    MCH 31.8 (H) 27.0 - 31.0 pg    MCHC 34.6 33.0  - 36.0 g/dL    RDW 13.0 11.5 - 17.0 %    Platelet 360 130 - 400 x10(3)/mcL    MPV 9.5 7.4 - 10.4 fL    Neut % 79.3 %    Lymph % 9.2 %    Mono % 8.9 %    Eos % 1.3 %    Basophil % 0.8 %    Lymph # 0.78 0.6 - 4.6 x10(3)/mcL    Neut # 6.72 2.1 - 9.2 x10(3)/mcL    Mono # 0.75 0.1 - 1.3 x10(3)/mcL    Eos # 0.11 0 - 0.9 x10(3)/mcL    Baso # 0.07 <=0.2 x10(3)/mcL    IG# 0.04 0 - 0.04 x10(3)/mcL    IG% 0.5 %    NRBC% 0.0 %     Telemetry:  Sinus Rhythm     Physical Exam  Vitals and nursing note reviewed.   Constitutional:       General: He is not in acute distress.     Appearance: Normal appearance. He is ill-appearing.   HENT:      Head: Normocephalic.      Nose: Nose normal.      Mouth/Throat:      Mouth: Mucous membranes are moist.      Pharynx: Oropharynx is clear.   Eyes:      Extraocular Movements: Extraocular movements intact.   Cardiovascular:      Rate and Rhythm: Normal rate and regular rhythm.      Pulses: Normal pulses.      Heart sounds: Normal heart sounds.   Pulmonary:      Effort: Pulmonary effort is normal. No respiratory distress.      Breath sounds: Normal breath sounds.      Comments: 2 L NC  Abdominal:      General: There is no distension.      Palpations: Abdomen is soft.      Tenderness: There is no abdominal tenderness. There is no guarding.   Genitourinary:     Comments: Celeste catheter   Musculoskeletal:         General: Normal range of motion.      Cervical back: Neck supple.      Right lower leg: No edema.      Left lower leg: No edema.   Skin:     General: Skin is warm and dry.      Comments: Mid Sternal Incision c/d/i   Neurological:      General: No focal deficit present.      Mental Status: He is alert and oriented to person, place, and time. Mental status is at baseline.      Motor: No weakness.   Psychiatric:         Mood and Affect: Mood normal.         Behavior: Behavior normal.         Judgment: Judgment normal.       Current Inpatient Medications:    Current Facility-Administered  Medications:     acetaminophen oral solution 650 mg, 650 mg, Per OG tube, Q6H PRN, Yoel Posada, PA, 650 mg at 03/28/24 1008    albumin human 5% bottle 12.5 g, 12.5 g, Intravenous, PRN, Yoel Posada, PA, Stopped at 03/25/24 1704    albuterol-ipratropium 2.5 mg-0.5 mg/3 mL nebulizer solution 3 mL, 3 mL, Nebulization, Q4H PRN, Ba Alves, NP, 3 mL at 04/02/24 0309    aluminum-magnesium hydroxide-simethicone 200-200-20 mg/5 mL suspension 30 mL, 30 mL, Oral, Q4H PRN, Monse Lima MD, 30 mL at 04/02/24 0014    [START ON 4/3/2024] amiodarone tablet 200 mg, 200 mg, Oral, BID, Rachelle Peterson FNP    [START ON 4/6/2024] amiodarone tablet 200 mg, 200 mg, Oral, Daily, Rachelle Peterson FNP    amiodarone tablet 400 mg, 400 mg, Oral, BID, Rachelle Peterson FNP, 400 mg at 04/02/24 0910    amLODIPine tablet 5 mg, 5 mg, Oral, Daily, Chetna Rivera FNP, 5 mg at 04/02/24 0911    apixaban tablet 5 mg, 5 mg, Oral, BID, Rachelle Peterson FNP, 5 mg at 04/02/24 0910    aspirin EC tablet 81 mg, 81 mg, Oral, Daily, Yoel Posada, PA, 81 mg at 04/02/24 0911    atorvastatin tablet 40 mg, 40 mg, Oral, Daily, Brock Young FNP, 40 mg at 04/02/24 0911    calcium gluconate 1 g in NS IVPB (premixed), 1 g, Intravenous, PRN, Yoel Posada, PA    calcium gluconate 1 g in NS IVPB (premixed), 2 g, Intravenous, PRN, Yoel Posada, PA    calcium gluconate 1 g in NS IVPB (premixed), 3 g, Intravenous, PRN, Yoel Posada PA    diazePAM tablet 10 mg, 10 mg, Oral, On Call Procedure, Monse Lima MD, 10 mg at 03/21/24 1021    diphenhydrAMINE capsule 50 mg, 50 mg, Oral, On Call Procedure, Monse Lima MD, 25 mg at 04/02/24 0200    docusate sodium capsule 100 mg, 100 mg, Oral, BID, Yoel Posada PA, 100 mg at 04/02/24 0910    folic acid tablet 1 mg, 1 mg, Oral, Daily, Yoel Posada PA, 1 mg at 04/02/24 0911    heparin, porcine (PF) 100 unit/mL injection flush 500 Units, 5  mL, Intravenous, On Call Procedure, Reji Sullivan MD    hydrALAZINE injection 10 mg, 10 mg, Intravenous, Q4H PRN, Chetna Rivera, FNP, 10 mg at 03/22/24 1903    HYDROcodone-acetaminophen 5-325 mg per tablet 1 tablet, 1 tablet, Oral, Q4H PRN, Yoel Posada, PA    lactulose 10 gram/15 ml solution 20 g, 20 g, Oral, Q6H PRN, Yoel Posada, PA    LIDOcaine HCL 20 mg/ml (2%) injection 2 mL, 2 mL, Intradermal, Once, Reji Sullivan MD    loperamide capsule 2 mg, 2 mg, Oral, Continuous PRN, Yoel Posada, PA    magnesium sulfate 2g in water 50mL IVPB (premix), 2 g, Intravenous, PRN, Yoel Posada, PA, Stopped at 03/26/24 0644    magnesium sulfate 2g in water 50mL IVPB (premix), 4 g, Intravenous, PRN, Yoel Posada P, PA    metoclopramide injection 5 mg, 5 mg, Intravenous, Q6H PRN, Yoel Posada, PA    metoprolol succinate (TOPROL-XL) 24 hr split tablet 12.5 mg, 12.5 mg, Oral, Daily, Chetna Rivera, FNP, 12.5 mg at 04/02/24 0909    morphine injection 2 mg, 2 mg, Intravenous, Q4H PRN, Karla Joseph FNP, 2 mg at 03/25/24 2020    morphine injection 4 mg, 4 mg, Intravenous, Q4H PRN, Yoel Posada P, PA    mupirocin 2 % ointment, , Nasal, On Call Procedure, Reji Sullivan MD    nitroGLYCERIN SL tablet 0.4 mg, 0.4 mg, Sublingual, Q5 Min PRN, Chetna Rivera, FNP, 0.4 mg at 04/02/24 0113    ondansetron injection 4 mg, 4 mg, Intravenous, Q12H PRN, Brock Young, BOUBACAR    ondansetron injection 4 mg, 4 mg, Intravenous, Q4H PRN, Yoel Posada PA, 4 mg at 03/27/24 0224    oxyCODONE immediate release tablet Tab 10 mg, 10 mg, Oral, Q4H PRN, Yoel Posada PA, 10 mg at 04/02/24 1043    pantoprazole EC tablet 40 mg, 40 mg, Oral, Daily, Chetna Rivera, FNP, 40 mg at 04/02/24 0910    polyethylene glycol packet 17 g, 17 g, Oral, Daily, Rachelle Peterson, FNP, 17 g at 04/02/24 0912    potassium chloride 20 mEq in 100 mL IVPB (FOR CENTRAL LINE ADMINISTRATION ONLY), 20 mEq, Intravenous, PRN,  oYel Posada PA, Last Rate: 50 mL/hr at 03/25/24 1817, 20 mEq at 03/25/24 1817    potassium chloride 20 mEq in 100 mL IVPB (FOR CENTRAL LINE ADMINISTRATION ONLY), 40 mEq, Intravenous, PRN, Yoel Posada, PA    potassium chloride 20 mEq in 100 mL IVPB (FOR CENTRAL LINE ADMINISTRATION ONLY), 20 mEq, Intravenous, PRN, Yoel Posada, PA    sodium chloride 0.9% flush 10 mL, 10 mL, Intravenous, PRN, Monse Lima MD    sodium phosphate 15 mmol in dextrose 5 % (D5W) 250 mL IVPB, 15 mmol, Intravenous, PRN, Yoel Posada, PA    sucralfate tablet 1 g, 1 g, Oral, QID (AC & HS), Yoel Posada, PA, 1 g at 04/02/24 1043  VTE Risk Mitigation (From admission, onward)           Ordered     apixaban tablet 5 mg  2 times daily         03/30/24 1247     Place IVANA hose  Until discontinued         03/25/24 1136     heparin, porcine (PF) 100 unit/mL injection flush 500 Units  On Call Procedure         03/24/24 2216                  Assessment/Plan:   Assessment:   CAD (Multivessel)- Status Post CABG (3.25.24) LIMA to LAD, SVG to OM, SVG to Distal RCA (HANK Ligation)    - LM: 90% Distal, LAD: 70% Ostial, LCX: 40-50% Proximal, RCA: (Dominant) 80-90% Serially Calcified Lesions (EF 50%) (3.21.24)  Newly Diagnosed Afib CVR - now SR    - CHADSVASC Score 3 Points   Hypertension  PAD    - REIA 70-80% Stenosis at Pelvic Brim (3.21.24)  Elevated Liver Enzymes - Improving   Urinary Retention   Nicotine Dependence/Chronic Tobacco Use  ROBBI    - 50-69% MUSTAPHA (CUS 3.21.24)  Anemia  Thrombocytopenia - Resolved     Impression:  Continue Aspirin 81 Mg Daily & Statin   Continue Toprol XL 12.5 Mg Daily & Norvasc 5 Mg Daily   Continue PO amio load: amio 400 mg BID x 3 days, then amio 200 mg BID x 3 days, then amio 200 mg daily. Continue to monitor for AF on tele.   Continue Eliquis for CVA prophylaxis in the setting of PAF.   Advance Mobilization as Able and Continue Regular IS Usage  Ensure accurate I&O's and daily weights.    Celeste remains in place.   CM on board for rehab placement.   Labs in AM: CBC, CMP, & Mg.     BOUBACAR Harman  Cardiology  [facility] - 6th Floor Medical Telemetry  04/02/2024

## 2024-04-02 NOTE — PROGRESS NOTES
04/02/24 0925   Pre Exercise Vitals   /52   Pulse 72   Supplemental O2? Yes   O2 Device nasal cannula   O2 Flow (L/min) 2   SpO2 96 %   During Exercise Vitals   Pulse 84   Supplemental O2? Yes   O2 Device nasal cannula   O2 Flow (L/min) 2   SpO2 94 %   Distance Walked 75 feet   Post Exercise Vitals   /82   Pulse 84   Supplemental O2? Yes   O2 Device nasal cannula   O2 Flow (L/min) 2   SpO2 96 %   Modality   Modality   (rollator)     Min assist x1  from supine to sitting and sitting to standing. Sternal precautions maintained. Gait steady, slow with rollator. Chair followed for early fatigue. Ambulated 75 feet. Assisted to chair. Encouraged incentive spirometer q 1 hour. Performed max 1000. Communicated with nurse pre and post walk.

## 2024-04-02 NOTE — PT/OT/SLP PROGRESS
Physical Therapy Treatment    Patient Name:  Ata Pickens   MRN:  [id number]    Recommendations:     Discharge therapy intensity: High Intensity Therapy   Discharge Equipment Recommendations:  (TBD)  Barriers to discharge: Ongoing medical needs    Assessment:     Ata Pickens is a 68 y.o. male admitted with a medical diagnosis of s/p CABG x 3.  He presents with the following impairments/functional limitations: weakness, impaired endurance, impaired self care skills, impaired functional mobility, gait instability, impaired balance, pain. Pt continues to require Keri for standing t/f to maintain sternal pxn. Improvements in endurance noted, pt ambulating x 100' with RW CGA prior to seated rest break. Slightly unsteady during ambulation but no major LOB.    Rehab Prognosis: Good; patient would benefit from acute skilled PT services to address these deficits and reach maximum level of function.    Recent Surgery: Procedure(s) (LRB):  CORONARY ARTERY BYPASS GRAFT (CABG) (N/A)  SURGICAL PROCUREMENT, VEIN, ENDOSCOPIC (Left)  EXCLUSION, LEFT ATRIAL APPENDAGE (N/A) 8 Days Post-Op    Plan:     During this hospitalization, patient to be seen 5 x/week to address the identified rehab impairments via gait training, therapeutic activities, therapeutic exercises and progress toward the following goals:    Plan of Care Expires:  05/04/24    Subjective     Chief Complaint: none noted at this time  Patient/Family Comments/goals: to go home  Pain/Comfort:  Pain Rating 1: 0/10      Objective:     Communicated with RN prior to session.  Patient found up in chair with peripheral IV, still catheter upon PT entry to room.     General Precautions: Standard, sternal  Orthopedic Precautions: N/A  Braces: N/A  Respiratory Status: Nasal cannula, flow 2 L/min    Functional Mobility:  Transfers:     Sit to Stand:  minimum assistance with rolling walker  Gait: Pt ambulated x 100' with RW CGA prior to seated rest break due to SOB/fatigue. No major  LOB, slight unsteadiness during ambulation with 2 standing rest breaks for deep breathing.     Education:  Patient provided with verbal education education regarding PT role/goals/POC, fall prevention, and safety awareness.  Understanding was verbalized.     Patient left up in chair with all lines intact, call button in reach, and grandson present    GOALS:   Multidisciplinary Problems       Physical Therapy Goals          Problem: Physical Therapy    Goal Priority Disciplines Outcome Goal Variances Interventions   Physical Therapy Goal     PT, PT/OT Ongoing, Progressing     Description: Goals to be met by: d/c     Patient will increase functional independence with mobility by performin. Supine to sit with Stand-by Assistance  2. Sit to supine with Stand-by Assistance  3. Sit to stand transfer with Stand-by Assistance  4. Gait  x 150 feet with Stand-by Assistance using Least Restrictive Assistive Device.   5. Ascend/descend 2 stair with unilateral railing Stand-by Assistance using No Assistive Device.                          Time Tracking:     PT Received On: 24  PT Start Time: 1440     PT Stop Time: 1503  PT Total Time (min): 23 min     Billable Minutes: Therapeutic Activity 23    Treatment Type: Treatment  PT/PTA: PT     Number of PTA visits since last PT visit: 4     2024

## 2024-04-03 LAB — POCT GLUCOSE: 177 MG/DL (ref 70–110)

## 2024-04-03 PROCEDURE — 25000003 PHARM REV CODE 250: Performed by: PHYSICIAN ASSISTANT

## 2024-04-03 PROCEDURE — 25000003 PHARM REV CODE 250: Performed by: NURSE PRACTITIONER

## 2024-04-03 PROCEDURE — 97110 THERAPEUTIC EXERCISES: CPT

## 2024-04-03 PROCEDURE — 94760 N-INVAS EAR/PLS OXIMETRY 1: CPT

## 2024-04-03 PROCEDURE — 21400001 HC TELEMETRY ROOM

## 2024-04-03 PROCEDURE — 27000221 HC OXYGEN, UP TO 24 HOURS

## 2024-04-03 PROCEDURE — 25000003 PHARM REV CODE 250

## 2024-04-03 RX ADMIN — HYDROCODONE BITARTRATE AND ACETAMINOPHEN 1 TABLET: 5; 325 TABLET ORAL at 12:04

## 2024-04-03 RX ADMIN — AMLODIPINE BESYLATE 5 MG: 5 TABLET ORAL at 08:04

## 2024-04-03 RX ADMIN — HYDROCODONE BITARTRATE AND ACETAMINOPHEN 1 TABLET: 5; 325 TABLET ORAL at 08:04

## 2024-04-03 RX ADMIN — ATORVASTATIN CALCIUM 40 MG: 40 TABLET, FILM COATED ORAL at 08:04

## 2024-04-03 RX ADMIN — DOCUSATE SODIUM 100 MG: 50 CAPSULE, LIQUID FILLED ORAL at 08:04

## 2024-04-03 RX ADMIN — AMIODARONE HYDROCHLORIDE 200 MG: 200 TABLET ORAL at 08:04

## 2024-04-03 RX ADMIN — SUCRALFATE 1 G: 1 TABLET ORAL at 04:04

## 2024-04-03 RX ADMIN — POLYETHYLENE GLYCOL 3350 17 G: 17 POWDER, FOR SOLUTION ORAL at 08:04

## 2024-04-03 RX ADMIN — PANTOPRAZOLE SODIUM 40 MG: 40 TABLET, DELAYED RELEASE ORAL at 08:04

## 2024-04-03 RX ADMIN — SUCRALFATE 1 G: 1 TABLET ORAL at 12:04

## 2024-04-03 RX ADMIN — APIXABAN 5 MG: 5 TABLET, FILM COATED ORAL at 08:04

## 2024-04-03 RX ADMIN — OXYCODONE HYDROCHLORIDE 10 MG: 5 TABLET ORAL at 03:04

## 2024-04-03 RX ADMIN — HYDROCODONE BITARTRATE AND ACETAMINOPHEN 1 TABLET: 5; 325 TABLET ORAL at 04:04

## 2024-04-03 RX ADMIN — METOPROLOL SUCCINATE 12.5 MG: 25 TABLET, EXTENDED RELEASE ORAL at 08:04

## 2024-04-03 RX ADMIN — FOLIC ACID 1 MG: 1 TABLET ORAL at 08:04

## 2024-04-03 RX ADMIN — ASPIRIN 81 MG: 81 TABLET, COATED ORAL at 08:04

## 2024-04-03 NOTE — PROGRESS NOTES
Inpatient Nutrition Assessment    Admit Date: 3/21/2024   Total duration of encounter: 13 days   Patient Age: 68 y.o.    Nutrition Recommendation/Prescription     Diet Heart Healthy ordered  Continue Boost VHC TID (provides 530 kcal and 22 g protein per container)  Encouraged adequate PO intake  Monitor appetite/PO intake, weight, and labs    Communication of Recommendations: reviewed with nurse, reviewed with patient, and reviewed with family    Nutrition Assessment     Malnutrition Assessment/Nutrition-Focused Physical Exam    Malnutrition Context: chronic illness (03/27/24 1418)  Malnutrition Level: moderate (03/27/24 1418)  Energy Intake (Malnutrition): other (see comments) (Does not meet criteria) (03/27/24 1418)  Weight Loss (Malnutrition): other (see comments) (Does not meet criteria) (03/27/24 1418)  Subcutaneous Fat (Malnutrition): moderate depletion (03/27/24 1418)     Upper Arm Region (Subcutaneous Fat Loss): moderate depletion     Muscle Mass (Malnutrition): moderate depletion (03/27/24 1418)  Hoahaoism Region (Muscle Loss): moderate depletion  Clavicle Bone Region (Muscle Loss): moderate depletion  Clavicle and Acromion Bone Region (Muscle Loss): moderate depletion  Scapular Bone Region (Muscle Loss): moderate depletion              Fluid Accumulation (Malnutrition): other (see comments) (Does not meet criteria) (03/27/24 1418)        A minimum of two characteristics is recommended for diagnosis of either severe or non-severe malnutrition.    Chart Review    Reason Seen: length of stay and follow-up    Malnutrition Screening Tool Results   Have you recently lost weight without trying?: No  Have you been eating poorly because of a decreased appetite?: No   MST Score: 0   Diagnosis:  CAD (Multivessel)- Status Post CABG (3.25.24) LIMA to LAD, SVG to OM, SVG to Distal RCA (HANK Ligation)    - LM: 90% Distal, LAD: 70% Ostial, LCX: 40-50% Proximal, RCA: (Dominant) 80-90% Serially Calcified Lesions (EF 50%)  (3.21.24)  Hypertension  PAD    - REIA 70-80% Stenosis at Pelvic Brim (3.21.24)  Nicotine Dependence/Chronic Tobacco Use  ROBBI    - 50-69% MUSTAPHA (CUS 3.21.24)  Anemia    Relevant Medical History:   Hypertension, Claudication, SOB, CP, Chronic Tobacco Use     Scheduled Medications:  amiodarone, 200 mg, BID  [START ON 4/6/2024] amiodarone, 200 mg, Daily  amLODIPine, 5 mg, Daily  apixaban, 5 mg, BID  aspirin, 81 mg, Daily  atorvastatin, 40 mg, Daily  docusate sodium, 100 mg, BID  folic acid, 1 mg, Daily  LIDOcaine HCL 20 mg/ml (2%), 2 mL, Once  metoprolol succinate, 12.5 mg, Daily  pantoprazole, 40 mg, Daily  polyethylene glycol, 17 g, Daily  sucralfate, 1 g, QID (AC & HS)    Continuous Infusions:  loperamide    PRN Medications: acetaminophen, albumin human 5%, albuterol-ipratropium, aluminum-magnesium hydroxide-simethicone, calcium gluconate IVPB, calcium gluconate IVPB, calcium gluconate IVPB, diazePAM, diphenhydrAMINE, heparin, porcine (PF), hydrALAZINE, HYDROcodone-acetaminophen, lactulose 10 gram/15 ml, loperamide, magnesium sulfate IVPB, magnesium sulfate IVPB, metoclopramide, morphine, morphine, mupirocin, nitroGLYCERIN, ondansetron, ondansetron, oxyCODONE, potassium chloride in water, potassium chloride in water, potassium chloride in water, sodium chloride 0.9%, sodium phosphate 15 mmol in dextrose 5 % (D5W) 250 mL IVPB    Calorie Containing IV Medications: no significant kcals from medications at this time    Recent Labs   Lab 03/28/24  0426 03/29/24  0849 03/30/24  0240 03/31/24  0338 04/01/24  0900 04/02/24  0424   * 134* 135* 135* 136 135*   K 4.4 4.5 3.9 3.7 4.0 4.2   CALCIUM 9.3 9.0 8.8 9.2 9.4 8.8   MG 1.90 2.00 1.90 2.10  --  2.00   CHLORIDE 100 95* 92* 92* 90* 93*   CO2 26 28 32* 35* 34* 33*   BUN 19.0 27.6* 27.2* 21.8 19.1 19.9   CREATININE 0.71* 0.75 0.68* 0.68* 0.77 0.71*   EGFRNORACEVR >60 >60 >60 >60 >60 >60   GLUCOSE 105 104 106 112 137* 115   BILITOT 1.0  --  1.0 0.9 1.2 1.1   ALKPHOS 64   "--  102 124 143 119   ALT 23  --  24 36 48 42   AST 52*  --  40* 40* 45* 35*   ALBUMIN 2.8*  --  2.7* 2.6* 3.0* 2.7*   WBC 13.44* 11.34 9.07 8.83  --  8.47   HGB 11.2* 10.8* 10.5* 10.5*  --  10.9*   HCT 32.9* 31.0* 31.1* 30.7*  --  31.5*     Nutrition Orders:  Diet Heart Healthy  Dietary nutrition supplements Boost Very High Calorie Nutritional Drink - Vanilla; All Meals    Appetite/Oral Intake: poor/25-50% of meals  Factors Affecting Nutritional Intake: decreased appetite  Food/Mormonism/Cultural Preferences: none reported  Food Allergies: none reported  Last Bowel Movement: 24  Wound(s):  none noted    Comments    3/27/2024: Pt reports a good appetite/PO intake prior to admit. Pt reports a poor appetite/PO intake currently. Pt agreeable to Boost VHC TID. Pt denies N/V/D/C and chewing/swallowing difficulties. Pt reports UBW as 52.3 kg. Last BM noted. Encouraged adequate PO intake. Will monitor.    2024: Pt's grandson reports ~25% PO intake with some appetite improvement. The grandson reports pt drinking Boost VHC with no reported N/V/D/C. Last BM noted, possible constipation, will relay to nurse. Will monitor.    4/3/2024: Pt reports poor appetite/PO intake but reports drinking Boost VHC. Encouraged adequate PO intake. Pt denies N/V/D but reports constipation. Docusate sodium and polyethylene glycol ordered. Will monitor.    Anthropometrics    Height: 5' 4" (162.6 cm), Height Method: Stated  Last Weight: 57.3 kg (126 lb 5.2 oz) (24 0500), Weight Method: Standard Scale  BMI (Calculated): 21.7  BMI Classification: underweight (BMI less than 22 if >65 years of age)        Ideal Body Weight (IBW), Male: 130 lb     % Ideal Body Weight, Male (lb): 81.74 %                 Usual Body Weight (UBW), k.3 kg  % Usual Body Weight: 102.12     Usual Weight Provided By: patient    Wt Readings from Last 5 Encounters:   24 57.3 kg (126 lb 5.2 oz)   04/10/23 48.5 kg (107 lb)     Weight Change(s) Since " Admission:   3/27/2024: 53.3 kg  4/1/2024: 51 kg  4/3/2024: 57.3 kg  Wt Readings from Last 1 Encounters:   04/03/24 0500 57.3 kg (126 lb 5.2 oz)   04/01/24 0232 51 kg (112 lb 6.4 oz)   03/31/24 0421 51 kg (112 lb 6.4 oz)   03/30/24 0436 54.1 kg (119 lb 3.2 oz)   03/29/24 0556 50.4 kg (111 lb 1.8 oz)   03/28/24 0601 55.4 kg (122 lb 1.6 oz)   03/27/24 0600 53.3 kg (117 lb 8.1 oz)   03/26/24 0527 52 kg (114 lb 10.2 oz)   03/25/24 0509 47.3 kg (104 lb 4.4 oz)   03/22/24 0549 51.4 kg (113 lb 6.4 oz)   03/21/24 0855 48.2 kg (106 lb 4.2 oz)   Admit Weight: 48.2 kg (106 lb 4.2 oz) (03/21/24 0855), Weight Method: Standard Scale    Estimated Needs    Weight Used For Calorie Calculations: 53.3 kg (117 lb 8.1 oz)  Energy Calorie Requirements (kcal): 8288-9937 (30-35 kcal/kg)  Energy Need Method: Kcal/kg  Weight Used For Protein Calculations: 53.3 kg (117 lb 8.1 oz)  Protein Requirements: 64-80 (1.2-1.5 g/kg)  Fluid Requirements (mL): 1599 (1 mL/kcal)    Enteral Nutrition     Patient not receiving enteral nutrition at this time.    Parenteral Nutrition     Patient not receiving parenteral nutrition support at this time.    Evaluation of Received Nutrient Intake    Calories: meeting estimated needs (including ONS)  Protein: meeting estimated needs (including ONS)    Patient Education     Not applicable.    Nutrition Diagnosis     PES: Inadequate oral intake related to acute illness as evidenced by poor PO intake >3 days. (active)     PES: Moderate chronic disease or condition related malnutrition related to chronic illness as evidenced by moderate fat depletion and moderate muscle depletion. (active)    Nutrition Interventions     Intervention(s): general/healthful diet and commercial beverage    Goal: Meet greater than 80% of nutritional needs by follow-up. (goal progressing)  Goal: Consume % of meals/snacks by follow-up. (goal progressing)    Nutrition Goals & Monitoring     Dietitian will monitor: food and beverage  intake, weight, electrolyte/renal panel, glucose/endocrine profile, and gastrointestinal profile    Nutrition Risk/Follow-Up: moderate (follow-up in 3-5 days)   Please consult if re-assessment needed sooner.

## 2024-04-03 NOTE — PLAN OF CARE
Problem: Adult Inpatient Plan of Care  Goal: Plan of Care Review  Outcome: Ongoing, Progressing  Goal: Patient-Specific Goal (Individualized)  Outcome: Ongoing, Progressing  Goal: Absence of Hospital-Acquired Illness or Injury  Outcome: Ongoing, Progressing  Goal: Optimal Comfort and Wellbeing  Outcome: Ongoing, Progressing  Goal: Readiness for Transition of Care  Outcome: Ongoing, Progressing     Problem: Impaired Wound Healing  Goal: Optimal Wound Healing  Outcome: Ongoing, Progressing     Problem: Infection  Goal: Absence of Infection Signs and Symptoms  Outcome: Ongoing, Progressing     Problem: Activity Intolerance (Cardiovascular Surgery)  Goal: Improved Activity Tolerance  Outcome: Ongoing, Progressing     Problem: Fall Injury Risk  Goal: Absence of Fall and Fall-Related Injury  Outcome: Ongoing, Progressing

## 2024-04-03 NOTE — PROGRESS NOTES
04/03/24 1226   Pre Exercise Vitals   /71   Pulse 70   Supplemental O2? Yes   O2 Device nasal cannula   O2 Flow (L/min) 1   SpO2 96 %   During Exercise Vitals   Pulse 82   Supplemental O2? No   O2 Flow (L/min) 1   SpO2 92 %   Distance Walked 75 feet   Post Exercise Vitals   /71   Pulse 73   Supplemental O2? Yes   O2 Device nasal cannula   O2 Flow (L/min) 1   SpO2 96 %   Modality   Modality   (rollator)     Min assist x1 from sitting to standing. Sternal precautions maintained and reinforced. Gait steady with rollator. Chair followed behind. Back to chair. Encouraged incentive spirometer. Performed max 750. Communicated with nurse pre and post walk. Plans to shower today.

## 2024-04-03 NOTE — PT/OT/SLP PROGRESS
Physical Therapy      Patient Name:  Ata Pickens   MRN:  [id number]    Patient not seen today secondary to Patient fatigue following shower, requesting to be seen tomorrow. Will follow-up 04/04.

## 2024-04-03 NOTE — PROGRESS NOTES
"  [facility] - 6th Floor Medical Telemetry    Cardiology  Progress Note    Patient Name: Ata Pickens  MRN: [id number]  Admission Date: 3/21/2024  Hospital Length of Stay: 13 days  Code Status: Full Code   Attending Physician: Monse Lima MD   Primary Care Physician: Lesa Stephen FNP  Expected Discharge Date:   Principal Problem:<principal problem not specified>    Subjective:   Chief Complaint:  Outpatient Cath- MV CAD- CABG      HPI:   Mr. Pickens is a 68 year old male, known to Dr. Lima, who presented to the hospital and underwent elective coronary angiogram due to diagnosis of abnormal stress test which was performed in the outpatient setting due to reported history of chest pain, SOB, and fatigue with minimal exertion. He underwent cath on 3.21.24 revealing MV CAD including 90% distal LM/Ostial LAD Disease. Patient's EF on LV Gram noted to be 50%. Patient was admitted to [facility]. CT Surgical services consulted for CABG evaluation.    Hospital Course:  3.22.24: NAD Noted. SB on Tele. BP Stable. CP This AM, Went away on its own. No CP Currently. Right Radial Site soft.  3.23.24: NAD Noted. SR on Tele. BP Stable. On Heparin Infusion.  3.24.24: NAD Noted. Vitals Stable. SR on Tele.  3.25.24: NAD Noted. CABG Today. Tolerated well. SR on Tele. On Cleviprex Infusion. CO/CI 4.9/3.3.   3.26.24: NAD Noted. Progressing well. SR on Tele. Not requiring Pressors.  3.27.24: NAD Noted. BP Stable, some intermittent hypertension. SR on Tele. Progressing well.   3.28.24: NAD Noted. Progressing well. CT Out today. SR on Tele. Some Post Op Incisional discomfort but overall doing well.   3.29.24: NAD. SR on tele. + Incisional CP. On 3 L NC. Daily net negative 1048 mL/24 hours. Labs pending. "I feel okay."   3.30.24: NAD. SR on tele. + Incisional CP. Denies SOB or palps. "I'm feeling fine." BP stable.   3.31.24: NAD.  SR on tele. Denies SOB or palps. Requiring 3 L NC. + Incisional CP. BP " stable.   4.1.24: NAD. SR on tele. BP stable. 2 L NC. + Incisional CP. Denies SOB or palps. BP stable. SR on tele. Celeste catheter in place.   4.2.24: NAD. SR on tele. BP stable. 2 L NC. Denies SOB or palps. Remains with weakness. Celeste catheter in place.   4.3.24: NAD. SR on tele. BP stable 3 L NC. Denies SOB or palps. Placement pending.     PMH: Hypertension, Claudication, SOB, CP, Chronic Tobacco Use  PSH: Wrist Surgery, Colostomy Closure  Family History: Father- Lung Cancer, Mother- Lung Cancer, Brother- Cardiac Pacemaker  Social History: Tobacco- Active Smoker, Alcohol- Negative, Substance Abuse- Negative     Previous Cardiac Diagnostics:   CABG (3.25.24):  Procedure:  Coronary artery bypass grafting X 3 ,   Left internal mammary artery to the LAD   Saphenous venous graft to OM  Saphenous venous graft to distal RCA  Ligation of left atrial appendage with a 35 mm atrial clip  Endoscopic venous harvesting left greater saphenous vein    Coronary Angiogram (3.21.24):  Coronary findings:  Dominance: right   Left main:  Calcified distal 90% stenosis extending ostium of the LAD.    Left anterior descending artery:  Type three-vessel with calcified ostial stenosis of at least 70%.    Circumflex artery:  Nondominant calcified vessel with proximal 40-50% stenosis.  The circumflex gives rise to a large patent bifurcating OM.  Right coronary artery:  Calcified dominant vessel with serial heavily calcified segment stenosis of up to 80-90%.  The RCA terminates in a moderate bifurcating PDA and a small PL segment.    Selective LIMA angiogram:  Large widely patent vessel  Aortic root angiogram:  Calcified Type 3 bovine arch with patent innominate and proximal ICA bilateral.  Abdominal angiogram: No AAA.  Left iliac system patent.  Left common femoral artery patent proximally.  Right common and internal iliac patent.  Right external iliac with 70-80% stenosis at the pelvic brim.  Right common femoral occluded.  Left  ventriculography:  EF- 50%.    Hemodynamics:LV/AO= 0 mmHg                      LVEDP= 13-16 mmHg       Carotid US (3.21.24):  The right internal carotid artery demonstrated 50-69% stenosis.   The left internal carotid artery demonstrated less than 50% stenosis.  Bilateral vertebral arteries were patent with antegrade flow.     Echocardiogram (3.5.24):  The left ventricle is decreased in size. Global left ventricular systolic function is borderline normal. The left ventricular ejection fraction is 50%. The left ventricle diastolic function is impaired (Grade I) with normal left atrial pressure.   The right atrium is moderately enlarged ~5.1 cm.  Mild (1+) tricuspid and trace mitral regurgitation.  The pulmonary artery systolic pressure is 23 mmHg.  The study quality is below average.      CV US Arterial Lower Extremities (3.5.24):  The study quality is good.   The right proximal superficial femoral artery is occluded.   The right mid and distal superficial femoral, posterior tibial, peroneal, anterior tibial, and dorsalis pedis arteries exhibit poor, diminished perfusion via collateral flow.  The right lower extremity arteries exhibit mono-phasic waveforms.  The left external iliac and common femoral arteries exhibit mono-phasic waveforms.  The remaining arteries of the left lower extremity exhibit bi-phasic waveforms.      PET (3.1.24):  This is an abnormal perfusion study. Study is consistent with significant anterior  ischemia.   This scan is suggestive of moderate to high risk for future cardiovascular events.   Large partially reversible perfusion abnormality of severe intensity in the anterior apical region. Large partially reversible perfusion abnormality of severe intensity in the anterior septal region. Small reversible perfusion abnormality of severe intensity in the apical lateral segment.   The left ventricular cavity is noted to be normal on the stress studies. The stress left ventricular ejection  fraction was calculated to be 37% and left ventricular global function is moderately reduced. The rest left ventricular cavity is noted to be normal. The rest left ventricular ejection fraction was calculated to be 52% and rest left ventricular global function is normal.   Hypokinesis of apical anterior segment is noted only in the stress studies which is suggestive of new ischemia. Persistent hypokinesis of the septal region is noted in both rest and stress studies. When compared to the resting ejection fraction (52%), the stress ejection fraction (37%) has decreased.   Transient ischemic dilatation is present and has been described as a marker for high risk coronary artery disease. It has also been described in microvascular disease, hypertensive heart disease as well as cardiac deconditioning.   The study quality is good.   There was no rise in myocardial blood flow between rest and stress.  Global myocardial blood flow reserve was 0.83.  Myocardial blood flow reserve is globally abnormal, placing the patient at a higher coronary event risk.    Review of Systems   Constitutional: Positive for malaise/fatigue.   Cardiovascular:  Negative for chest pain.   Respiratory:  Negative for shortness of breath.    Musculoskeletal:         Post Op Incisional Pain     Objective:     Vital Signs (Most Recent):  Temp: 97.8 °F (36.6 °C) (04/03/24 1054)  Pulse: 75 (04/03/24 1200)  Resp: 18 (04/03/24 1235)  BP: 134/68 (04/03/24 1054)  SpO2: 97 % (04/03/24 1054) Vital Signs (24h Range):  Temp:  [96.8 °F (36 °C)-97.8 °F (36.6 °C)] 97.8 °F (36.6 °C)  Pulse:  [66-87] 75  Resp:  [18-22] 18  SpO2:  [92 %-97 %] 97 %  BP: (113-134)/(63-69) 134/68   Weight: 57.3 kg (126 lb 5.2 oz)  Body mass index is 21.68 kg/m².  SpO2: 97 %       Intake/Output Summary (Last 24 hours) at 4/3/2024 1345  Last data filed at 4/3/2024 0500  Gross per 24 hour   Intake 960 ml   Output 1050 ml   Net -90 ml       Lines/Drains/Airways       Drain  Duration                   Urethral Catheter 03/25/24  18 Fr. 9 days              Peripheral Intravenous Line  Duration                  Peripheral IV - Single Lumen 03/27/24 0825 18 G Anterior;Left Upper Arm 7 days                  Significant Labs:   Recent Results (from the past 72 hour(s))   Comprehensive Metabolic Panel    Collection Time: 04/01/24  9:00 AM   Result Value Ref Range    Sodium Level 136 136 - 145 mmol/L    Potassium Level 4.0 3.5 - 5.1 mmol/L    Chloride 90 (L) 98 - 107 mmol/L    Carbon Dioxide 34 (H) 23 - 31 mmol/L    Glucose Level 137 (H) 82 - 115 mg/dL    Blood Urea Nitrogen 19.1 8.4 - 25.7 mg/dL    Creatinine 0.77 0.73 - 1.18 mg/dL    Calcium Level Total 9.4 8.8 - 10.0 mg/dL    Protein Total 6.0 5.8 - 7.6 gm/dL    Albumin Level 3.0 (L) 3.4 - 4.8 g/dL    Globulin 3.0 2.4 - 3.5 gm/dL    Albumin/Globulin Ratio 1.0 (L) 1.1 - 2.0 ratio    Bilirubin Total 1.2 <=1.5 mg/dL    Alkaline Phosphatase 143 40 - 150 unit/L    Alanine Aminotransferase 48 0 - 55 unit/L    Aspartate Aminotransferase 45 (H) 5 - 34 unit/L    eGFR >60 mls/min/1.73/m2   EKG 12-lead    Collection Time: 04/02/24  1:19 AM   Result Value Ref Range    QRS Duration 86 ms    [facility] QTC Calculation 471 ms   Comprehensive Metabolic Panel    Collection Time: 04/02/24  4:24 AM   Result Value Ref Range    Sodium Level 135 (L) 136 - 145 mmol/L    Potassium Level 4.2 3.5 - 5.1 mmol/L    Chloride 93 (L) 98 - 107 mmol/L    Carbon Dioxide 33 (H) 23 - 31 mmol/L    Glucose Level 115 82 - 115 mg/dL    Blood Urea Nitrogen 19.9 8.4 - 25.7 mg/dL    Creatinine 0.71 (L) 0.73 - 1.18 mg/dL    Calcium Level Total 8.8 8.8 - 10.0 mg/dL    Protein Total 5.4 (L) 5.8 - 7.6 gm/dL    Albumin Level 2.7 (L) 3.4 - 4.8 g/dL    Globulin 2.7 2.4 - 3.5 gm/dL    Albumin/Globulin Ratio 1.0 (L) 1.1 - 2.0 ratio    Bilirubin Total 1.1 <=1.5 mg/dL    Alkaline Phosphatase 119 40 - 150 unit/L    Alanine Aminotransferase 42 0 - 55 unit/L    Aspartate Aminotransferase 35 (H) 5 - 34 unit/L    eGFR >60  mls/min/1.73/m2   Magnesium    Collection Time: 04/02/24  4:24 AM   Result Value Ref Range    Magnesium Level 2.00 1.60 - 2.60 mg/dL   CBC with Differential    Collection Time: 04/02/24  4:24 AM   Result Value Ref Range    WBC 8.47 4.50 - 11.50 x10(3)/mcL    RBC 3.43 (L) 4.70 - 6.10 x10(6)/mcL    Hgb 10.9 (L) 14.0 - 18.0 g/dL    Hct 31.5 (L) 42.0 - 52.0 %    MCV 91.8 80.0 - 94.0 fL    MCH 31.8 (H) 27.0 - 31.0 pg    MCHC 34.6 33.0 - 36.0 g/dL    RDW 13.0 11.5 - 17.0 %    Platelet 360 130 - 400 x10(3)/mcL    MPV 9.5 7.4 - 10.4 fL    Neut % 79.3 %    Lymph % 9.2 %    Mono % 8.9 %    Eos % 1.3 %    Basophil % 0.8 %    Lymph # 0.78 0.6 - 4.6 x10(3)/mcL    Neut # 6.72 2.1 - 9.2 x10(3)/mcL    Mono # 0.75 0.1 - 1.3 x10(3)/mcL    Eos # 0.11 0 - 0.9 x10(3)/mcL    Baso # 0.07 <=0.2 x10(3)/mcL    IG# 0.04 0 - 0.04 x10(3)/mcL    IG% 0.5 %    NRBC% 0.0 %     Telemetry:  Sinus Rhythm     Physical Exam  Vitals and nursing note reviewed.   Constitutional:       General: He is not in acute distress.     Appearance: Normal appearance. He is ill-appearing.   HENT:      Head: Normocephalic.      Nose: Nose normal.      Mouth/Throat:      Mouth: Mucous membranes are moist.      Pharynx: Oropharynx is clear.   Eyes:      Extraocular Movements: Extraocular movements intact.      Conjunctiva/sclera: Conjunctivae normal.   Cardiovascular:      Rate and Rhythm: Normal rate and regular rhythm.      Pulses: Normal pulses.      Heart sounds: Normal heart sounds.   Pulmonary:      Effort: Pulmonary effort is normal. No respiratory distress.      Breath sounds: Normal breath sounds.      Comments: 3 L NC  Abdominal:      General: There is no distension.      Palpations: Abdomen is soft.      Tenderness: There is no abdominal tenderness. There is no guarding.   Genitourinary:     Comments: Celeste catheter   Musculoskeletal:         General: Normal range of motion.      Cervical back: Neck supple.      Right lower leg: No edema.      Left lower leg:  No edema.   Skin:     General: Skin is warm and dry.      Comments: Mid Sternal Incision c/d/i   Neurological:      General: No focal deficit present.      Mental Status: He is alert and oriented to person, place, and time. Mental status is at baseline.      Motor: No weakness.   Psychiatric:         Mood and Affect: Mood normal.         Behavior: Behavior normal.         Thought Content: Thought content normal.         Judgment: Judgment normal.       Current Inpatient Medications:    Current Facility-Administered Medications:     acetaminophen oral solution 650 mg, 650 mg, Per OG tube, Q6H PRN, Yoel Posada PA, 650 mg at 03/28/24 1008    albumin human 5% bottle 12.5 g, 12.5 g, Intravenous, PRN, Yoel Posada PA, Stopped at 03/25/24 1704    albuterol-ipratropium 2.5 mg-0.5 mg/3 mL nebulizer solution 3 mL, 3 mL, Nebulization, Q4H PRN, Ba Alves, NP, 3 mL at 04/02/24 0309    aluminum-magnesium hydroxide-simethicone 200-200-20 mg/5 mL suspension 30 mL, 30 mL, Oral, Q4H PRN, Monse Lima MD, 30 mL at 04/02/24 0014    amiodarone tablet 200 mg, 200 mg, Oral, BID, Rachelle Peterson FNP, 200 mg at 04/03/24 0825    [START ON 4/6/2024] amiodarone tablet 200 mg, 200 mg, Oral, Daily, Rachelle Peterson FNP    amLODIPine tablet 5 mg, 5 mg, Oral, Daily, Chetna Rivera, FNP, 5 mg at 04/03/24 0826    apixaban tablet 5 mg, 5 mg, Oral, BID, Rachelle Peterson FNP, 5 mg at 04/03/24 0826    aspirin EC tablet 81 mg, 81 mg, Oral, Daily, Yoel Posada PA, 81 mg at 04/03/24 0825    atorvastatin tablet 40 mg, 40 mg, Oral, Daily, Brock Young, FNP, 40 mg at 04/03/24 0825    calcium gluconate 1 g in NS IVPB (premixed), 1 g, Intravenous, PRN, Yoel Posada, PA    calcium gluconate 1 g in NS IVPB (premixed), 2 g, Intravenous, PRN, Yoel Posada, PA    calcium gluconate 1 g in NS IVPB (premixed), 3 g, Intravenous, PRN, Yoel Posada, PA    diazePAM tablet 10 mg, 10 mg, Oral, On Call  Procedure, Monse Lima MD, 10 mg at 03/21/24 1021    diphenhydrAMINE capsule 50 mg, 50 mg, Oral, On Call Procedure, Monse Lima MD, 25 mg at 04/02/24 0200    docusate sodium capsule 100 mg, 100 mg, Oral, BID, Yoel Posada, PA, 100 mg at 04/03/24 0826    folic acid tablet 1 mg, 1 mg, Oral, Daily, Yoel Posada, PA, 1 mg at 04/03/24 0825    heparin, porcine (PF) 100 unit/mL injection flush 500 Units, 5 mL, Intravenous, On Call Procedure, Reji Sullivan MD    hydrALAZINE injection 10 mg, 10 mg, Intravenous, Q4H PRN, Chetna Rivera, FNP, 10 mg at 03/22/24 1903    HYDROcodone-acetaminophen 5-325 mg per tablet 1 tablet, 1 tablet, Oral, Q4H PRN, Yoel Posada PA, 1 tablet at 04/03/24 1235    lactulose 10 gram/15 ml solution 20 g, 20 g, Oral, Q6H PRN, Yoel Posada, PA    LIDOcaine HCL 20 mg/ml (2%) injection 2 mL, 2 mL, Intradermal, Once, Reji Sullivan MD    loperamide capsule 2 mg, 2 mg, Oral, Continuous PRN, Yoel Posada, PA    magnesium sulfate 2g in water 50mL IVPB (premix), 2 g, Intravenous, PRN, Yoel Posada, PA, Stopped at 03/26/24 0644    magnesium sulfate 2g in water 50mL IVPB (premix), 4 g, Intravenous, PRN, Yoel Posada, PA    metoclopramide injection 5 mg, 5 mg, Intravenous, Q6H PRN, Yoel Posada PA    metoprolol succinate (TOPROL-XL) 24 hr split tablet 12.5 mg, 12.5 mg, Oral, Daily, Chetna Rivera T, FNP, 12.5 mg at 04/03/24 0826    morphine injection 2 mg, 2 mg, Intravenous, Q4H PRN, Karla Joseph, FNP, 2 mg at 03/25/24 2020    morphine injection 4 mg, 4 mg, Intravenous, Q4H PRN, Yoel Posada PA    mupirocin 2 % ointment, , Nasal, On Call Procedure, Reji Sullivan MD    nitroGLYCERIN SL tablet 0.4 mg, 0.4 mg, Sublingual, Q5 Min PRN, Chetna Rivera, BOUBACAR, 0.4 mg at 04/02/24 0113    ondansetron injection 4 mg, 4 mg, Intravenous, Q12H PRN, Brock Young FNP    ondansetron injection 4 mg, 4 mg, Intravenous, Q4H PRN, Yoel Posada  P, PA, 4 mg at 03/27/24 0224    oxyCODONE immediate release tablet Tab 10 mg, 10 mg, Oral, Q4H PRN, Yoel Posada P, PA, 10 mg at 04/03/24 0326    pantoprazole EC tablet 40 mg, 40 mg, Oral, Daily, Chetna Rivera, FNP, 40 mg at 04/03/24 0825    polyethylene glycol packet 17 g, 17 g, Oral, Daily, Rachelle Peterson, FNP, 17 g at 04/03/24 0825    potassium chloride 20 mEq in 100 mL IVPB (FOR CENTRAL LINE ADMINISTRATION ONLY), 20 mEq, Intravenous, PRN, Swetha, Yoel P, PA, Last Rate: 50 mL/hr at 03/25/24 1817, 20 mEq at 03/25/24 1817    potassium chloride 20 mEq in 100 mL IVPB (FOR CENTRAL LINE ADMINISTRATION ONLY), 40 mEq, Intravenous, PRN, Swetha, Yoel P, PA    potassium chloride 20 mEq in 100 mL IVPB (FOR CENTRAL LINE ADMINISTRATION ONLY), 20 mEq, Intravenous, PRN, Boais, Yoel P, PA    sodium chloride 0.9% flush 10 mL, 10 mL, Intravenous, PRN, Monse Lima MD    sodium phosphate 15 mmol in dextrose 5 % (D5W) 250 mL IVPB, 15 mmol, Intravenous, PRN, Swetha Yoel P, PA    sucralfate tablet 1 g, 1 g, Oral, QID (AC & HS), Yoel Posada P, PA, 1 g at 04/03/24 1234  VTE Risk Mitigation (From admission, onward)           Ordered     apixaban tablet 5 mg  2 times daily         03/30/24 1247     Place IVANA hose  Until discontinued         03/25/24 1136     heparin, porcine (PF) 100 unit/mL injection flush 500 Units  On Call Procedure         03/24/24 2211                  Assessment/Plan:   Assessment:   CAD (Multivessel)- Status Post CABG (3.25.24) LIMA to LAD, SVG to OM, SVG to Distal RCA (HANK Ligation)    - LM: 90% Distal, LAD: 70% Ostial, LCX: 40-50% Proximal, RCA: (Dominant) 80-90% Serially Calcified Lesions (EF 50%) (3.21.24)  Newly Diagnosed Afib CVR - now SR    - CHADSVASC Score 3 Points   Hypertension  PAD    - REIA 70-80% Stenosis at Pelvic Brim (3.21.24)  Elevated Liver Enzymes - Improving   Urinary Retention   Nicotine Dependence/Chronic Tobacco Use  ROBBI    - 50-69% MUSTAPHA (CUS  3.21.24)  Anemia  Thrombocytopenia - Resolved     Impression:  Continue Aspirin 81 Mg Daily & Statin   Continue Toprol XL 12.5 Mg Daily & Norvasc 5 Mg Daily   Continue PO amio load: amio 400 mg BID x 3 days, then amio 200 mg BID x 3 days, then amio 200 mg daily. Continue to monitor for AF on tele.   Continue Eliquis for CVA prophylaxis in the setting of PAF.   Advance Mobilization as Able and Continue Regular IS Usage  Ensure accurate I&O's and daily weights.   Celeste remains in place. Plans to f/u with Urology in the outpatient setting and discharge home with Celeste.   CM on board for rehab placement.   Labs in AM: CBC, CMP, & Mg.     BOUBACAR Harman  Cardiology  [facility] - 6th Floor Medical Telemetry  04/03/2024

## 2024-04-04 LAB
ALBUMIN SERPL-MCNC: 2.6 G/DL (ref 3.4–4.8)
ALBUMIN/GLOB SERPL: 1 RATIO (ref 1.1–2)
ALP SERPL-CCNC: 123 UNIT/L (ref 40–150)
ALT SERPL-CCNC: 52 UNIT/L (ref 0–55)
AST SERPL-CCNC: 53 UNIT/L (ref 5–34)
BASOPHILS # BLD AUTO: 0.09 X10(3)/MCL
BASOPHILS NFR BLD AUTO: 1.2 %
BILIRUB SERPL-MCNC: 0.9 MG/DL
BUN SERPL-MCNC: 21.3 MG/DL (ref 8.4–25.7)
CALCIUM SERPL-MCNC: 8.8 MG/DL (ref 8.8–10)
CHLORIDE SERPL-SCNC: 95 MMOL/L (ref 98–107)
CO2 SERPL-SCNC: 36 MMOL/L (ref 23–31)
CREAT SERPL-MCNC: 0.73 MG/DL (ref 0.73–1.18)
EOSINOPHIL # BLD AUTO: 0.2 X10(3)/MCL (ref 0–0.9)
EOSINOPHIL NFR BLD AUTO: 2.6 %
ERYTHROCYTE [DISTWIDTH] IN BLOOD BY AUTOMATED COUNT: 13 % (ref 11.5–17)
GFR SERPLBLD CREATININE-BSD FMLA CKD-EPI: >60 MLS/MIN/1.73/M2
GLOBULIN SER-MCNC: 2.5 GM/DL (ref 2.4–3.5)
GLUCOSE SERPL-MCNC: 104 MG/DL (ref 82–115)
HCT VFR BLD AUTO: 32 % (ref 42–52)
HGB BLD-MCNC: 10.5 G/DL (ref 14–18)
IMM GRANULOCYTES # BLD AUTO: 0.03 X10(3)/MCL (ref 0–0.04)
IMM GRANULOCYTES NFR BLD AUTO: 0.4 %
LYMPHOCYTES # BLD AUTO: 0.87 X10(3)/MCL (ref 0.6–4.6)
LYMPHOCYTES NFR BLD AUTO: 11.4 %
MAGNESIUM SERPL-MCNC: 2.2 MG/DL (ref 1.6–2.6)
MCH RBC QN AUTO: 31.2 PG (ref 27–31)
MCHC RBC AUTO-ENTMCNC: 32.8 G/DL (ref 33–36)
MCV RBC AUTO: 95 FL (ref 80–94)
MONOCYTES # BLD AUTO: 0.55 X10(3)/MCL (ref 0.1–1.3)
MONOCYTES NFR BLD AUTO: 7.2 %
NEUTROPHILS # BLD AUTO: 5.87 X10(3)/MCL (ref 2.1–9.2)
NEUTROPHILS NFR BLD AUTO: 77.2 %
NRBC BLD AUTO-RTO: 0 %
PLATELET # BLD AUTO: 453 X10(3)/MCL (ref 130–400)
PMV BLD AUTO: 9.5 FL (ref 7.4–10.4)
POCT GLUCOSE: 106 MG/DL (ref 70–110)
POTASSIUM SERPL-SCNC: 4.3 MMOL/L (ref 3.5–5.1)
PROT SERPL-MCNC: 5.1 GM/DL (ref 5.8–7.6)
RBC # BLD AUTO: 3.37 X10(6)/MCL (ref 4.7–6.1)
SODIUM SERPL-SCNC: 137 MMOL/L (ref 136–145)
WBC # SPEC AUTO: 7.61 X10(3)/MCL (ref 4.5–11.5)

## 2024-04-04 PROCEDURE — 94760 N-INVAS EAR/PLS OXIMETRY 1: CPT

## 2024-04-04 PROCEDURE — 83735 ASSAY OF MAGNESIUM: CPT

## 2024-04-04 PROCEDURE — 25000003 PHARM REV CODE 250: Performed by: PHYSICIAN ASSISTANT

## 2024-04-04 PROCEDURE — 25000003 PHARM REV CODE 250: Performed by: NURSE PRACTITIONER

## 2024-04-04 PROCEDURE — 21400001 HC TELEMETRY ROOM

## 2024-04-04 PROCEDURE — 27000221 HC OXYGEN, UP TO 24 HOURS

## 2024-04-04 PROCEDURE — 85025 COMPLETE CBC W/AUTO DIFF WBC: CPT

## 2024-04-04 PROCEDURE — 97110 THERAPEUTIC EXERCISES: CPT

## 2024-04-04 PROCEDURE — 25000003 PHARM REV CODE 250

## 2024-04-04 PROCEDURE — 80053 COMPREHEN METABOLIC PANEL: CPT

## 2024-04-04 PROCEDURE — 97116 GAIT TRAINING THERAPY: CPT

## 2024-04-04 RX ADMIN — ATORVASTATIN CALCIUM 40 MG: 40 TABLET, FILM COATED ORAL at 08:04

## 2024-04-04 RX ADMIN — DOCUSATE SODIUM 100 MG: 50 CAPSULE, LIQUID FILLED ORAL at 08:04

## 2024-04-04 RX ADMIN — LACTULOSE 20 G: 10 SOLUTION ORAL at 04:04

## 2024-04-04 RX ADMIN — APIXABAN 5 MG: 5 TABLET, FILM COATED ORAL at 09:04

## 2024-04-04 RX ADMIN — APIXABAN 5 MG: 5 TABLET, FILM COATED ORAL at 08:04

## 2024-04-04 RX ADMIN — HYDROCODONE BITARTRATE AND ACETAMINOPHEN 1 TABLET: 5; 325 TABLET ORAL at 04:04

## 2024-04-04 RX ADMIN — HYDROCODONE BITARTRATE AND ACETAMINOPHEN 1 TABLET: 5; 325 TABLET ORAL at 12:04

## 2024-04-04 RX ADMIN — AMLODIPINE BESYLATE 5 MG: 5 TABLET ORAL at 08:04

## 2024-04-04 RX ADMIN — ASPIRIN 81 MG: 81 TABLET, COATED ORAL at 08:04

## 2024-04-04 RX ADMIN — SUCRALFATE 1 G: 1 TABLET ORAL at 09:04

## 2024-04-04 RX ADMIN — AMIODARONE HYDROCHLORIDE 200 MG: 200 TABLET ORAL at 08:04

## 2024-04-04 RX ADMIN — METOPROLOL SUCCINATE 12.5 MG: 25 TABLET, EXTENDED RELEASE ORAL at 08:04

## 2024-04-04 RX ADMIN — HYDROCODONE BITARTRATE AND ACETAMINOPHEN 1 TABLET: 5; 325 TABLET ORAL at 08:04

## 2024-04-04 RX ADMIN — AMIODARONE HYDROCHLORIDE 200 MG: 200 TABLET ORAL at 09:04

## 2024-04-04 RX ADMIN — DOCUSATE SODIUM 100 MG: 50 CAPSULE, LIQUID FILLED ORAL at 09:04

## 2024-04-04 RX ADMIN — PANTOPRAZOLE SODIUM 40 MG: 40 TABLET, DELAYED RELEASE ORAL at 08:04

## 2024-04-04 RX ADMIN — FOLIC ACID 1 MG: 1 TABLET ORAL at 08:04

## 2024-04-04 RX ADMIN — HYDROCODONE BITARTRATE AND ACETAMINOPHEN 1 TABLET: 5; 325 TABLET ORAL at 09:04

## 2024-04-04 NOTE — PROGRESS NOTES
04/04/24 1030   Pre Exercise Vitals   /65   Pulse 78   Supplemental O2? Yes   O2 Device nasal cannula   O2 Flow (L/min) 2   SpO2 94 %   During Exercise Vitals   Pulse 77   O2 Device nasal cannula   O2 Flow (L/min) 2   SpO2 92 %   Distance Walked 125 feet   Post Exercise Vitals   /84   Pulse 73   Supplemental O2? No   O2 Flow (L/min) 2   SpO2 94 %   Modality   Modality   (rollator)     Min assist x1 from sitting to standing. Sternal precautions maintained. Gait steady with rollator. Chair followed. Early fatigue. Encouraged incentive spirometer q 1 hour and increase activity. Performed IS max 1000. Communicated with nurse pre and post walk.

## 2024-04-04 NOTE — PROGRESS NOTES
"  [facility] - 6th Floor Medical Telemetry    Cardiology  Progress Note    Patient Name: Ata Pickens  MRN: [id number]  Admission Date: 3/21/2024  Hospital Length of Stay: 14 days  Code Status: Full Code   Attending Physician: Monse Lima MD   Primary Care Physician: Lesa Stephen FNP  Expected Discharge Date:   Principal Problem:<principal problem not specified>    Subjective:   Chief Complaint:  Outpatient Cath- MV CAD- CABG      HPI:   Mr. Pickens is a 68 year old male, known to Dr. Lima, who presented to the hospital and underwent elective coronary angiogram due to diagnosis of abnormal stress test which was performed in the outpatient setting due to reported history of chest pain, SOB, and fatigue with minimal exertion. He underwent cath on 3.21.24 revealing MV CAD including 90% distal LM/Ostial LAD Disease. Patient's EF on LV Gram noted to be 50%. Patient was admitted to [facility]. CT Surgical services consulted for CABG evaluation.    Hospital Course:  3.22.24: NAD Noted. SB on Tele. BP Stable. CP This AM, Went away on its own. No CP Currently. Right Radial Site soft.  3.23.24: NAD Noted. SR on Tele. BP Stable. On Heparin Infusion.  3.24.24: NAD Noted. Vitals Stable. SR on Tele.  3.25.24: NAD Noted. CABG Today. Tolerated well. SR on Tele. On Cleviprex Infusion. CO/CI 4.9/3.3.   3.26.24: NAD Noted. Progressing well. SR on Tele. Not requiring Pressors.  3.27.24: NAD Noted. BP Stable, some intermittent hypertension. SR on Tele. Progressing well.   3.28.24: NAD Noted. Progressing well. CT Out today. SR on Tele. Some Post Op Incisional discomfort but overall doing well.   3.29.24: NAD. SR on tele. + Incisional CP. On 3 L NC. Daily net negative 1048 mL/24 hours. Labs pending. "I feel okay."   3.30.24: NAD. SR on tele. + Incisional CP. Denies SOB or palps. "I'm feeling fine." BP stable.   3.31.24: NAD.  SR on tele. Denies SOB or palps. Requiring 3 L NC. + Incisional CP. BP " stable.   4.1.24: NAD. SR on tele. BP stable. 2 L NC. + Incisional CP. Denies SOB or palps. BP stable. SR on tele. Celeste catheter in place.   4.2.24: NAD. SR on tele. BP stable. 2 L NC. Denies SOB or palps. Remains with weakness. Celeste catheter in place.   4.3.24: NAD. SR on tele. BP stable 3 L NC. Denies SOB or palps. Placement pending.   4.4.24: NAD. SR on tele. On 2 L NC. Denies SOB or palps. Reports that he feels he is getting strong. Rehab placement pending.     PMH: Hypertension, Claudication, SOB, CP, Chronic Tobacco Use  PSH: Wrist Surgery, Colostomy Closure  Family History: Father- Lung Cancer, Mother- Lung Cancer, Brother- Cardiac Pacemaker  Social History: Tobacco- Active Smoker, Alcohol- Negative, Substance Abuse- Negative     Previous Cardiac Diagnostics:   CABG (3.25.24):  Procedure:  Coronary artery bypass grafting X 3 ,   Left internal mammary artery to the LAD   Saphenous venous graft to OM  Saphenous venous graft to distal RCA  Ligation of left atrial appendage with a 35 mm atrial clip  Endoscopic venous harvesting left greater saphenous vein    Coronary Angiogram (3.21.24):  Coronary findings:  Dominance: right   Left main:  Calcified distal 90% stenosis extending ostium of the LAD.    Left anterior descending artery:  Type three-vessel with calcified ostial stenosis of at least 70%.    Circumflex artery:  Nondominant calcified vessel with proximal 40-50% stenosis.  The circumflex gives rise to a large patent bifurcating OM.  Right coronary artery:  Calcified dominant vessel with serial heavily calcified segment stenosis of up to 80-90%.  The RCA terminates in a moderate bifurcating PDA and a small PL segment.    Selective LIMA angiogram:  Large widely patent vessel  Aortic root angiogram:  Calcified Type 3 bovine arch with patent innominate and proximal ICA bilateral.  Abdominal angiogram: No AAA.  Left iliac system patent.  Left common femoral artery patent proximally.  Right common and internal  iliac patent.  Right external iliac with 70-80% stenosis at the pelvic brim.  Right common femoral occluded.  Left ventriculography:  EF- 50%.    Hemodynamics:LV/AO= 0 mmHg                      LVEDP= 13-16 mmHg       Carotid US (3.21.24):  The right internal carotid artery demonstrated 50-69% stenosis.   The left internal carotid artery demonstrated less than 50% stenosis.  Bilateral vertebral arteries were patent with antegrade flow.     Echocardiogram (3.5.24):  The left ventricle is decreased in size. Global left ventricular systolic function is borderline normal. The left ventricular ejection fraction is 50%. The left ventricle diastolic function is impaired (Grade I) with normal left atrial pressure.   The right atrium is moderately enlarged ~5.1 cm.  Mild (1+) tricuspid and trace mitral regurgitation.  The pulmonary artery systolic pressure is 23 mmHg.  The study quality is below average.      CV US Arterial Lower Extremities (3.5.24):  The study quality is good.   The right proximal superficial femoral artery is occluded.   The right mid and distal superficial femoral, posterior tibial, peroneal, anterior tibial, and dorsalis pedis arteries exhibit poor, diminished perfusion via collateral flow.  The right lower extremity arteries exhibit mono-phasic waveforms.  The left external iliac and common femoral arteries exhibit mono-phasic waveforms.  The remaining arteries of the left lower extremity exhibit bi-phasic waveforms.      PET (3.1.24):  This is an abnormal perfusion study. Study is consistent with significant anterior  ischemia.   This scan is suggestive of moderate to high risk for future cardiovascular events.   Large partially reversible perfusion abnormality of severe intensity in the anterior apical region. Large partially reversible perfusion abnormality of severe intensity in the anterior septal region. Small reversible perfusion abnormality of severe intensity in the apical lateral segment.    The left ventricular cavity is noted to be normal on the stress studies. The stress left ventricular ejection fraction was calculated to be 37% and left ventricular global function is moderately reduced. The rest left ventricular cavity is noted to be normal. The rest left ventricular ejection fraction was calculated to be 52% and rest left ventricular global function is normal.   Hypokinesis of apical anterior segment is noted only in the stress studies which is suggestive of new ischemia. Persistent hypokinesis of the septal region is noted in both rest and stress studies. When compared to the resting ejection fraction (52%), the stress ejection fraction (37%) has decreased.   Transient ischemic dilatation is present and has been described as a marker for high risk coronary artery disease. It has also been described in microvascular disease, hypertensive heart disease as well as cardiac deconditioning.   The study quality is good.   There was no rise in myocardial blood flow between rest and stress.  Global myocardial blood flow reserve was 0.83.  Myocardial blood flow reserve is globally abnormal, placing the patient at a higher coronary event risk.    Review of Systems   Constitutional: Positive for malaise/fatigue.   Cardiovascular:  Negative for chest pain.   Respiratory:  Negative for shortness of breath.    Musculoskeletal:         Post Op Incisional Pain     Objective:     Vital Signs (Most Recent):  Temp: 97.8 °F (36.6 °C) (04/04/24 1100)  Pulse: 68 (04/04/24 1100)  Resp: 20 (04/04/24 1229)  BP: 125/75 (04/04/24 1100)  SpO2: 95 % (04/04/24 1100) Vital Signs (24h Range):  Temp:  [97.5 °F (36.4 °C)-98.6 °F (37 °C)] 97.8 °F (36.6 °C)  Pulse:  [64-73] 68  Resp:  [16-20] 20  SpO2:  [94 %-96 %] 95 %  BP: (120-136)/(44-80) 125/75   Weight: 57.3 kg (126 lb 5.2 oz)  Body mass index is 21.68 kg/m².  SpO2: 95 %       Intake/Output Summary (Last 24 hours) at 4/4/2024 1411  Last data filed at 4/4/2024 1253  Gross per  24 hour   Intake 1620 ml   Output 1601 ml   Net 19 ml       Lines/Drains/Airways       Drain  Duration                  Urethral Catheter 03/25/24  18 Fr. 10 days              Peripheral Intravenous Line  Duration                  Peripheral IV - Single Lumen 03/27/24 0825 18 G Anterior;Left Upper Arm 8 days                  Significant Labs:   Recent Results (from the past 72 hour(s))   EKG 12-lead    Collection Time: 04/02/24  1:19 AM   Result Value Ref Range    QRS Duration 86 ms    [facility] QTC Calculation 471 ms   Comprehensive Metabolic Panel    Collection Time: 04/02/24  4:24 AM   Result Value Ref Range    Sodium Level 135 (L) 136 - 145 mmol/L    Potassium Level 4.2 3.5 - 5.1 mmol/L    Chloride 93 (L) 98 - 107 mmol/L    Carbon Dioxide 33 (H) 23 - 31 mmol/L    Glucose Level 115 82 - 115 mg/dL    Blood Urea Nitrogen 19.9 8.4 - 25.7 mg/dL    Creatinine 0.71 (L) 0.73 - 1.18 mg/dL    Calcium Level Total 8.8 8.8 - 10.0 mg/dL    Protein Total 5.4 (L) 5.8 - 7.6 gm/dL    Albumin Level 2.7 (L) 3.4 - 4.8 g/dL    Globulin 2.7 2.4 - 3.5 gm/dL    Albumin/Globulin Ratio 1.0 (L) 1.1 - 2.0 ratio    Bilirubin Total 1.1 <=1.5 mg/dL    Alkaline Phosphatase 119 40 - 150 unit/L    Alanine Aminotransferase 42 0 - 55 unit/L    Aspartate Aminotransferase 35 (H) 5 - 34 unit/L    eGFR >60 mls/min/1.73/m2   Magnesium    Collection Time: 04/02/24  4:24 AM   Result Value Ref Range    Magnesium Level 2.00 1.60 - 2.60 mg/dL   CBC with Differential    Collection Time: 04/02/24  4:24 AM   Result Value Ref Range    WBC 8.47 4.50 - 11.50 x10(3)/mcL    RBC 3.43 (L) 4.70 - 6.10 x10(6)/mcL    Hgb 10.9 (L) 14.0 - 18.0 g/dL    Hct 31.5 (L) 42.0 - 52.0 %    MCV 91.8 80.0 - 94.0 fL    MCH 31.8 (H) 27.0 - 31.0 pg    MCHC 34.6 33.0 - 36.0 g/dL    RDW 13.0 11.5 - 17.0 %    Platelet 360 130 - 400 x10(3)/mcL    MPV 9.5 7.4 - 10.4 fL    Neut % 79.3 %    Lymph % 9.2 %    Mono % 8.9 %    Eos % 1.3 %    Basophil % 0.8 %    Lymph # 0.78 0.6 - 4.6 x10(3)/mcL    Neut  # 6.72 2.1 - 9.2 x10(3)/mcL    Mono # 0.75 0.1 - 1.3 x10(3)/mcL    Eos # 0.11 0 - 0.9 x10(3)/mcL    Baso # 0.07 <=0.2 x10(3)/mcL    IG# 0.04 0 - 0.04 x10(3)/mcL    IG% 0.5 %    NRBC% 0.0 %   POCT glucose    Collection Time: 04/03/24  8:25 PM   Result Value Ref Range    POCT Glucose 177 (H) 70 - 110 mg/dL   Comprehensive Metabolic Panel    Collection Time: 04/04/24  4:13 AM   Result Value Ref Range    Sodium Level 137 136 - 145 mmol/L    Potassium Level 4.3 3.5 - 5.1 mmol/L    Chloride 95 (L) 98 - 107 mmol/L    Carbon Dioxide 36 (H) 23 - 31 mmol/L    Glucose Level 104 82 - 115 mg/dL    Blood Urea Nitrogen 21.3 8.4 - 25.7 mg/dL    Creatinine 0.73 0.73 - 1.18 mg/dL    Calcium Level Total 8.8 8.8 - 10.0 mg/dL    Protein Total 5.1 (L) 5.8 - 7.6 gm/dL    Albumin Level 2.6 (L) 3.4 - 4.8 g/dL    Globulin 2.5 2.4 - 3.5 gm/dL    Albumin/Globulin Ratio 1.0 (L) 1.1 - 2.0 ratio    Bilirubin Total 0.9 <=1.5 mg/dL    Alkaline Phosphatase 123 40 - 150 unit/L    Alanine Aminotransferase 52 0 - 55 unit/L    Aspartate Aminotransferase 53 (H) 5 - 34 unit/L    eGFR >60 mls/min/1.73/m2   Magnesium    Collection Time: 04/04/24  4:13 AM   Result Value Ref Range    Magnesium Level 2.20 1.60 - 2.60 mg/dL   CBC with Differential    Collection Time: 04/04/24  4:13 AM   Result Value Ref Range    WBC 7.61 4.50 - 11.50 x10(3)/mcL    RBC 3.37 (L) 4.70 - 6.10 x10(6)/mcL    Hgb 10.5 (L) 14.0 - 18.0 g/dL    Hct 32.0 (L) 42.0 - 52.0 %    MCV 95.0 (H) 80.0 - 94.0 fL    MCH 31.2 (H) 27.0 - 31.0 pg    MCHC 32.8 (L) 33.0 - 36.0 g/dL    RDW 13.0 11.5 - 17.0 %    Platelet 453 (H) 130 - 400 x10(3)/mcL    MPV 9.5 7.4 - 10.4 fL    Neut % 77.2 %    Lymph % 11.4 %    Mono % 7.2 %    Eos % 2.6 %    Basophil % 1.2 %    Lymph # 0.87 0.6 - 4.6 x10(3)/mcL    Neut # 5.87 2.1 - 9.2 x10(3)/mcL    Mono # 0.55 0.1 - 1.3 x10(3)/mcL    Eos # 0.20 0 - 0.9 x10(3)/mcL    Baso # 0.09 <=0.2 x10(3)/mcL    IG# 0.03 0 - 0.04 x10(3)/mcL    IG% 0.4 %    NRBC% 0.0 %   POCT glucose     Collection Time: 04/04/24  4:53 AM   Result Value Ref Range    POCT Glucose 106 70 - 110 mg/dL     Telemetry:  Sinus Rhythm     Physical Exam  Vitals and nursing note reviewed.   Constitutional:       General: He is not in acute distress.     Appearance: Normal appearance. He is ill-appearing.   HENT:      Head: Normocephalic.      Nose: Nose normal.      Mouth/Throat:      Mouth: Mucous membranes are moist.      Pharynx: Oropharynx is clear.   Eyes:      Extraocular Movements: Extraocular movements intact.      Conjunctiva/sclera: Conjunctivae normal.   Cardiovascular:      Rate and Rhythm: Normal rate and regular rhythm.      Pulses: Normal pulses.      Heart sounds: Normal heart sounds.   Pulmonary:      Effort: Pulmonary effort is normal. No respiratory distress.      Breath sounds: Normal breath sounds.      Comments: 2 L NC  Abdominal:      General: There is no distension.      Palpations: Abdomen is soft.      Tenderness: There is no abdominal tenderness. There is no guarding.   Genitourinary:     Comments: Celeste catheter   Musculoskeletal:         General: Normal range of motion.      Cervical back: Neck supple.      Right lower leg: No edema.      Left lower leg: No edema.   Skin:     General: Skin is warm and dry.      Comments: Mid Sternal Incision c/d/i   Neurological:      General: No focal deficit present.      Mental Status: He is alert and oriented to person, place, and time. Mental status is at baseline.      Motor: No weakness.   Psychiatric:         Mood and Affect: Mood normal.         Behavior: Behavior normal.         Thought Content: Thought content normal.         Judgment: Judgment normal.       Current Inpatient Medications:    Current Facility-Administered Medications:     acetaminophen oral solution 650 mg, 650 mg, Per OG tube, Q6H PRN, Yoel Posada PA, 650 mg at 03/28/24 1008    albumin human 5% bottle 12.5 g, 12.5 g, Intravenous, PRN, Yoel Posada PA, Stopped at 03/25/24  1704    albuterol-ipratropium 2.5 mg-0.5 mg/3 mL nebulizer solution 3 mL, 3 mL, Nebulization, Q4H PRN, Ba Alves NP, 3 mL at 04/02/24 0309    aluminum-magnesium hydroxide-simethicone 200-200-20 mg/5 mL suspension 30 mL, 30 mL, Oral, Q4H PRN, Monse Lima MD, 30 mL at 04/02/24 0014    amiodarone tablet 200 mg, 200 mg, Oral, BID, Rachelle Peterson, FNP, 200 mg at 04/04/24 0830    [START ON 4/6/2024] amiodarone tablet 200 mg, 200 mg, Oral, Daily, Rachelle Peterson, FNP    amLODIPine tablet 5 mg, 5 mg, Oral, Daily, Chetna Rivera, FNP, 5 mg at 04/04/24 0835    apixaban tablet 5 mg, 5 mg, Oral, BID, Rachelle Peterson FNP, 5 mg at 04/04/24 0833    aspirin EC tablet 81 mg, 81 mg, Oral, Daily, Yoel Posada, PA, 81 mg at 04/04/24 0829    atorvastatin tablet 40 mg, 40 mg, Oral, Daily, Brock Young, FNP, 40 mg at 04/04/24 0835    calcium gluconate 1 g in NS IVPB (premixed), 1 g, Intravenous, PRN, Yoel Posada P, PA    calcium gluconate 1 g in NS IVPB (premixed), 2 g, Intravenous, PRN, Yoel Posada P, PA    calcium gluconate 1 g in NS IVPB (premixed), 3 g, Intravenous, PRN, Yoel Posada P, PA    diazePAM tablet 10 mg, 10 mg, Oral, On Call Procedure, Monse Lima MD, 10 mg at 03/21/24 1021    diphenhydrAMINE capsule 50 mg, 50 mg, Oral, On Call Procedure, Monse Lima MD, 25 mg at 04/02/24 0200    docusate sodium capsule 100 mg, 100 mg, Oral, BID, Yoel Posada, PA, 100 mg at 04/04/24 0834    folic acid tablet 1 mg, 1 mg, Oral, Daily, Yoel Posada PA, 1 mg at 04/04/24 0834    heparin, porcine (PF) 100 unit/mL injection flush 500 Units, 5 mL, Intravenous, On Call Procedure, Reji Sullivan MD    hydrALAZINE injection 10 mg, 10 mg, Intravenous, Q4H PRN, Chetna Rivera, FNP, 10 mg at 03/22/24 1903    HYDROcodone-acetaminophen 5-325 mg per tablet 1 tablet, 1 tablet, Oral, Q4H PRN, Yoel Posada PA, 1 tablet at 04/04/24 1229    lactulose 10 gram/15 ml  solution 20 g, 20 g, Oral, Q6H PRN, Yoel Posada, PA, 20 g at 04/04/24 0443    LIDOcaine HCL 20 mg/ml (2%) injection 2 mL, 2 mL, Intradermal, Once, Reji Sullivan MD    loperamide capsule 2 mg, 2 mg, Oral, Continuous PRN, Yoel Posada, PA    magnesium sulfate 2g in water 50mL IVPB (premix), 2 g, Intravenous, PRN, Yoel Posada PA, Stopped at 03/26/24 0644    magnesium sulfate 2g in water 50mL IVPB (premix), 4 g, Intravenous, PRN, Yoel Posada PA    metoclopramide injection 5 mg, 5 mg, Intravenous, Q6H PRN, Yoel Posada PA    metoprolol succinate (TOPROL-XL) 24 hr split tablet 12.5 mg, 12.5 mg, Oral, Daily, Chetna Rivera, FNP, 12.5 mg at 04/04/24 0831    morphine injection 2 mg, 2 mg, Intravenous, Q4H PRN, Karla Joseph FNP, 2 mg at 03/25/24 2020    morphine injection 4 mg, 4 mg, Intravenous, Q4H PRN, Yoel Posada PA    mupirocin 2 % ointment, , Nasal, On Call Procedure, Reji Sullivan MD    nitroGLYCERIN SL tablet 0.4 mg, 0.4 mg, Sublingual, Q5 Min PRN, Chetna Rivera, FNP, 0.4 mg at 04/02/24 0113    ondansetron injection 4 mg, 4 mg, Intravenous, Q12H PRN, Brock Young, LEXIIP    ondansetron injection 4 mg, 4 mg, Intravenous, Q4H PRN, Yoel Posada PA, 4 mg at 03/27/24 0224    oxyCODONE immediate release tablet Tab 10 mg, 10 mg, Oral, Q4H PRN, Yoel Posada, PA, 10 mg at 04/03/24 0326    pantoprazole EC tablet 40 mg, 40 mg, Oral, Daily, Chetna Rivera, FNP, 40 mg at 04/04/24 0835    polyethylene glycol packet 17 g, 17 g, Oral, Daily, Dougieliane Rachelle CLOVIS, FNP, 17 g at 04/03/24 0825    potassium chloride 20 mEq in 100 mL IVPB (FOR CENTRAL LINE ADMINISTRATION ONLY), 20 mEq, Intravenous, PRN, Yoel Posada, PA, Last Rate: 50 mL/hr at 03/25/24 1817, 20 mEq at 03/25/24 1817    potassium chloride 20 mEq in 100 mL IVPB (FOR CENTRAL LINE ADMINISTRATION ONLY), 40 mEq, Intravenous, PRN, Yoel Posada, PA    potassium chloride 20 mEq in 100 mL IVPB (FOR  CENTRAL LINE ADMINISTRATION ONLY), 20 mEq, Intravenous, PRN, Yoel Posada PA    sodium chloride 0.9% flush 10 mL, 10 mL, Intravenous, PRN, Monse Lima MD    sodium phosphate 15 mmol in dextrose 5 % (D5W) 250 mL IVPB, 15 mmol, Intravenous, PRN, Yoel Posada PA    sucralfate tablet 1 g, 1 g, Oral, QID (AC & HS), Yoel Posada PA, 1 g at 04/03/24 1626  VTE Risk Mitigation (From admission, onward)           Ordered     apixaban tablet 5 mg  2 times daily         03/30/24 1247     Place IVANA hose  Until discontinued         03/25/24 1136     heparin, porcine (PF) 100 unit/mL injection flush 500 Units  On Call Procedure         03/24/24 6982                  Assessment/Plan:   Assessment:   CAD (Multivessel)- Status Post CABG (3.25.24) LIMA to LAD, SVG to OM, SVG to Distal RCA (HANK Ligation)    - LM: 90% Distal, LAD: 70% Ostial, LCX: 40-50% Proximal, RCA: (Dominant) 80-90% Serially Calcified Lesions (EF 50%) (3.21.24)  Newly Diagnosed Afib CVR - now SR    - CHADSVASC Score 3 Points   Hypertension  PAD    - REIA 70-80% Stenosis at Pelvic Brim (3.21.24)  Elevated Liver Enzymes - Improving   Urinary Retention     - Celeste Catheter in place  Nicotine Dependence/Chronic Tobacco Use  ROBBI    - 50-69% MUSTAPHA (CUS 3.21.24)  Anemia  Thrombocytopenia - Resolved     Impression:  Continue Aspirin, statin, BB, & amlodipine 5 mg daily.   Continue PO amio load: amio 400 mg BID x 3 days, then amio 200 mg BID x 3 days, then amio 200 mg daily. Continue to monitor for AF on tele.   Continue Eliquis for CVA prophylaxis in the setting of PAF.   Advance Mobilization as Able and Continue Regular IS Usage  Ensure accurate I&O's and daily weights.   Celeste remains in place. Plans to f/u with Urology in the outpatient setting and discharge home with Celeste.   CM on board for rehab placement. Per the patient's daughter, if he does not get accepted to rehab, will plan for DC home tomorrow with .   Labs in AM: CBC, CMP, & Mg.      Rachelle Peterson, KIRILL  Cardiology  [facility] - 6th Floor Medical Telemetry  04/04/2024

## 2024-04-04 NOTE — CONSULTS
[facility] 6th Floor Medical Telemetry  Inpatient Rehab Prescreen    PATIENT INFORMATION     Assessment date/time: 04/04/2024 1210    Name: Ata Pickens Phone: [phone]   Address:   [address] [place] [id number] SSN:    YOB: 1955 Age: 68 y.o. Gender: male   Race: White   Marital Status: Single   Advance Directives: Full code     COVERAGE INFORMATION     Patient Medicare #: [id number]    Primary Insurance Type: Wellcare/Wellcare Medicare [place] Secondary Insurance Type: Medicaid[place]   Policy #: [id number] Policy #: [id number]   Insurance contact name/number:  Insurance contact name/number:    Authorization #:  Authorization #:    Verified Coverage: Insurance Carrier   Pending Coverage:    Prescription Coverage:  Insurance details/comments:      PHYSICIAN/REFERRAL INFORMATION     Primary Care Physician: Lesa Stephen FNP Attending Physician: Monse Lima MD   Consulting physician/specialist:  Referring Physician:    Referring facility: [facility] [place] Referring contact name/phone:    Physician details/comments:      CONTACT INFORMATION   Extended Emergency Contact Information  Primary Emergency Contact: Apollo Pickens  Mobile Phone: [phone]  Relation: Brother   needed? No  Secondary Emergency Contact: Celio,erika  Mobile Phone: [phone]  Relation: Daughter  Preferred language: English   needed? No    PRIOR LIVING SITUATION    Pt. Lives with his wife and grandson in a single story home with 2 steps to enter with bilateral  railings   Bedroom location/setup: 1st floor   Bathroom location/setup: tub/shower combination with no shower chair   Equipment at home: none   Prior device use:  none     PRIOR LEVEL OF FUNCTION     Did a helper need to assist with the following activities prior to the current illness, exacerbation, or injury?   Self-care:  No, independent   Indoor mobility:  No, independent   Stairs: No, independent    Functional Cognition: No, independent   Comments:    Caregivers providing assistance:   Pre-hospital home care service:  Name of Agency:    Home/personal responsibilities: Pt. Was completely independent with ADLs and mobility prior to admit to the acute hospital   Pre-hospital vocational category: Retired for age   Pre-hospital vocational effort: Retired for age   Occupation/profession:  Return to work/school plan:    Educational history:    Hobbies/leisure activities:  Resources used prior to admission:    Available resources:  Resource information comments:      REHABILITATION DIAGNOSIS     History of present illness: Mr. Pickens is a 68 year old male, known to Dr. Lima, who presented to the hospital and underwent elective coronary angiogram due to diagnosis of abnormal stress test which was performed in the outpatient setting due to reported history of chest pain, SOB, and fatigue with minimal exertion. He underwent cath on 3.21.24 revealing MV CAD including 90% distal LM/Ostial LAD Disease. Patient's EF on LV Gram noted to be 50%. Patient was admitted to [facility] Surgical services consulted for CABG evaluation. Pt. Had CABG x 3 3/25/2024. Pt. Is participating with therapy. He will benefit from an inpatient rehab stay. This will help to increase patient's functional independence with ADLs and mobility prior to return home.   Pt. Requires acute inpatient rehab admission with 24-hour nursing and active physician oversight to monitor and manage acute medical comorbid conditions, labs, pain, and functional deficits.  Patient/family will also require teaching and integration of improving functional skills into daily living.  He will also require an individualized, interdisciplinary approach to his care, receiving PT, OT services 3 hours per day, 5 days per week.    Required care cannot be provided at a lower level of care. Patient is anticipated to require approximately 10-12 days LOS with expected  discharge home with spouse with HH        Impairment group (IGC):   Cardiac 09 Etiologic diagnosis/description: CAD   Date of onset:  3/21/2024 Date of surgery: 3/25/2024 CABG x 3   Allergies: Codeine   Comorbid condition: Hypertension   Medical/functional conditions requiring inpatient rehabilitation: This patient requires medical management/24-hour nursing?of complex    comorbidities ( HTN, tobacco smoker), labs, medications (see medications list), incision care, pain, sleep  hygiene, anticoagulation, nutrition, hydration, neurological,    pulmonary, cardiac status, and preventive healthcare.         This patient requires intense therapy and an integrated,    interdisciplinary approach to address safety, impaired mobility,    impaired ADLs (dressing, toileting, grooming, showering),     pulmonary insufficiency, preventive healthcare, medication management, integration of  improving functional skills into daily living, and home caregiver    support and training.       Risk for medical/clinical complications: This patient is at risk for the following complications: DVT/PE, pneumonia,  malnutrition, neurological decline, respiratory insufficiency,  worsening activity intolerance, complications from anticoagulation,    Incision infection, skin breakdown, inadequate    sleep, and constipation.         SPECIAL REHABILITATION NEEDS     IV: 18 gauge left anterior upper arm saline lock and Requires O2: 1-2 liters Preferred Language: English        Peripheral IV - Single Lumen 03/27/24 0825 18 G Anterior;Left Upper Arm (Active)   Site Assessment Clean;Dry;Intact 04/04/24 0800   Extremity Assessment Distal to IV No abnormal discoloration;No redness;No swelling 04/04/24 0400   Line Status Saline locked 04/04/24 0800   Dressing Status Clean;Dry;Intact 04/04/24 0800   Dressing Intervention Integrity maintained 04/04/24 0400          PRECAUTIONS     High risk for fall, fall precautions, sternal precautions due to CABG     PAST  "MEDICAL, SOCIAL, FAMILY HISTORY     Pertinent past medical history:   Past Medical History:   Diagnosis Date    COPD (chronic obstructive pulmonary disease)     Hypertension       Has the patient had two or more falls in the past year or any fall with injury in the past year: no   Has the patient had major surgery during the 100 days prior to admission: yes   Family Medical History:   Family History   Problem Relation Age of Onset    Cancer Mother     Lung cancer Mother     Cancer Father     Lung cancer Father       Substance use history:   Social History     Substance and Sexual Activity   Alcohol Use Not Currently     Social History     Tobacco Use   Smoking Status Every Day    Current packs/day: 0.50    Types: Cigarettes   Smokeless Tobacco Never     Social History     Substance and Sexual Activity   Drug Use Never      VITALS   BP:  125/75     Temp: 97.8 °F (36.6 °C)     HR: 68     Resp: 17     SpO2: 95 %     Height: 5' 4" (1.626 m)     Weight: 57.3 kg (126 lb 5.2 oz)     BMI: 21.7          MED/LABS/DIAGNOSITICS   Current Facility-Administered Medications   Medication Dose Route Frequency Provider Last Rate Last Admin    acetaminophen oral solution 650 mg  650 mg Per OG tube Q6H PRN Yoel Posada PA   650 mg at 03/28/24 1008    albumin human 5% bottle 12.5 g  12.5 g Intravenous PRN Yoel Posada PA   Stopped at 03/25/24 1704    albuterol-ipratropium 2.5 mg-0.5 mg/3 mL nebulizer solution 3 mL  3 mL Nebulization Q4H PRN Ba Alves NP   3 mL at 04/02/24 0309    aluminum-magnesium hydroxide-simethicone 200-200-20 mg/5 mL suspension 30 mL  30 mL Oral Q4H PRN Monse Lima MD   30 mL at 04/02/24 0014    amiodarone tablet 200 mg  200 mg Oral BID Rachelle Peterson FNP   200 mg at 04/04/24 0830    [START ON 4/6/2024] amiodarone tablet 200 mg  200 mg Oral Daily Rachelle Peterson FNP        amLODIPine tablet 5 mg  5 mg Oral Daily Chetna Rivera FNP   5 mg at 04/04/24 0835    apixaban tablet 5 " mg  5 mg Oral BID Rachelle Peterson, FNP   5 mg at 04/04/24 0833    aspirin EC tablet 81 mg  81 mg Oral Daily Yoel Posada, PA   81 mg at 04/04/24 0829    atorvastatin tablet 40 mg  40 mg Oral Daily HannahBrock benson, FNP   40 mg at 04/04/24 0835    calcium gluconate 1 g in NS IVPB (premixed)  1 g Intravenous PRN Yoel Posada P, PA        calcium gluconate 1 g in NS IVPB (premixed)  2 g Intravenous PRN Yoel Posada P, PA        calcium gluconate 1 g in NS IVPB (premixed)  3 g Intravenous PRN Yoel Posada, PA        diazePAM tablet 10 mg  10 mg Oral On Call Procedure Mosne Lima MD   10 mg at 03/21/24 1021    diphenhydrAMINE capsule 50 mg  50 mg Oral On Call Procedure oMnse Lima MD   25 mg at 04/02/24 0200    docusate sodium capsule 100 mg  100 mg Oral BID Yoel Posada, PA   100 mg at 04/04/24 0834    folic acid tablet 1 mg  1 mg Oral Daily Yoel Posada, PA   1 mg at 04/04/24 0834    heparin, porcine (PF) 100 unit/mL injection flush 500 Units  5 mL Intravenous On Call Procedure Reji Sullivan MD        hydrALAZINE injection 10 mg  10 mg Intravenous Q4H PRN Chetna Rivera, FNP   10 mg at 03/22/24 1903    HYDROcodone-acetaminophen 5-325 mg per tablet 1 tablet  1 tablet Oral Q4H PRN Yoel Posada, PA   1 tablet at 04/04/24 0832    lactulose 10 gram/15 ml solution 20 g  20 g Oral Q6H PRN Yoel Posada, PA   20 g at 04/04/24 0443    LIDOcaine HCL 20 mg/ml (2%) injection 2 mL  2 mL Intradermal Once Reji Sullivan MD        loperamide capsule 2 mg  2 mg Oral Continuous PRN Yoel Posada, PA        magnesium sulfate 2g in water 50mL IVPB (premix)  2 g Intravenous PRN Yoel Posada, PA   Stopped at 03/26/24 0644    magnesium sulfate 2g in water 50mL IVPB (premix)  4 g Intravenous PRN Yoel Posada, PA        metoclopramide injection 5 mg  5 mg Intravenous Q6H PRN Yoel Posada, PA        metoprolol succinate (TOPROL-XL) 24 hr split tablet  12.5 mg  12.5 mg Oral Daily Chetna Rivera, LEXIIP   12.5 mg at 04/04/24 0831    morphine injection 2 mg  2 mg Intravenous Q4H PRN Karla Joseph FNP   2 mg at 03/25/24 2020    morphine injection 4 mg  4 mg Intravenous Q4H PRN Yoel Posada PA        mupirocin 2 % ointment   Nasal On Call Procedure Reji Sullivan MD        nitroGLYCERIN SL tablet 0.4 mg  0.4 mg Sublingual Q5 Min PRN Chetna Rivera FNP   0.4 mg at 04/02/24 0113    ondansetron injection 4 mg  4 mg Intravenous Q12H PRN Brock Young FNP        ondansetron injection 4 mg  4 mg Intravenous Q4H PRN Yoel Posada, PA   4 mg at 03/27/24 0224    oxyCODONE immediate release tablet Tab 10 mg  10 mg Oral Q4H PRN Yoel Posada PA   10 mg at 04/03/24 0326    pantoprazole EC tablet 40 mg  40 mg Oral Daily Chetna Rivera, LEXIIP   40 mg at 04/04/24 0835    polyethylene glycol packet 17 g  17 g Oral Daily Rachelle Peterson FNP   17 g at 04/03/24 0825    potassium chloride 20 mEq in 100 mL IVPB (FOR CENTRAL LINE ADMINISTRATION ONLY)  20 mEq Intravenous PRN Yoel Posada PA 50 mL/hr at 03/25/24 1817 20 mEq at 03/25/24 1817    potassium chloride 20 mEq in 100 mL IVPB (FOR CENTRAL LINE ADMINISTRATION ONLY)  40 mEq Intravenous PRN Yoel Posada, PA        potassium chloride 20 mEq in 100 mL IVPB (FOR CENTRAL LINE ADMINISTRATION ONLY)  20 mEq Intravenous PRN Yoel Posada, PA        sodium chloride 0.9% flush 10 mL  10 mL Intravenous PRN Monse Lima MD        sodium phosphate 15 mmol in dextrose 5 % (D5W) 250 mL IVPB  15 mmol Intravenous PRN Yole Posada, PA        sucralfate tablet 1 g  1 g Oral QID (AC & HS) Yoel Posada, PA   1 g at 04/03/24 1626      Pertinent lab results:   Lab Results   Component Value Date    WBC 7.61 04/04/2024    HGB 10.5 (L) 04/04/2024    HCT 32.0 (L) 04/04/2024     (H) 04/04/2024     04/04/2024    K 4.3 04/04/2024    BUN 21.3 04/04/2024    CO2 36 (H) 04/04/2024       Pertinent diagnostic studies:    Additional labs and diagnostic studies required prior to admission:      QI: GG Care Tool     QI: GG Care Tool  Therapy Evaluation   Swallowing/  Nutritional Status   Diet/Feeding/  Swallowing    Eating       Oral Hygiene   Grooming    Toileting Hygiene       Shower/Bathe   Bathing    Upper Body Dressing   Dressing Upper    Lower Body Dressing   Dressing Lower    Putting On/Taking Off Footwear       Toilet Transfer   Toileting    Bladder Continence Status   Bladder     Bowel Continence Status   Bowel     Roll Left and Right   Bed Mobility Patient completed Supine to Sit with minimum assistance  Patient completed Sit to Supine with minimum assistance    Sit to Lying       Lying to Sitting on Side of Bed       Sit to Stand       Chair/Bed-to- Chair   Transfers Sit to Stand:  minimum assistance with rolling walker   Toilet Transfer Step Transfer technique with minimum assistance with  rolling walker and cardiac bear.    Car Transfer       Walk 10 Feet   Equipment      Walk 50 Feet with Two Turns   Balance    Walk 150 Feet   Endurance Decreased endurance with decreased gait speed.        Walk 10 Feet on Uneven Surfaces   Gait   Pt ambulated x 100' with RW CGA prior to seated rest break due to SOB/fatigue. No major LOB, slight unsteadiness during ambulation with 2 standing rest breaks for deep breathing.    Picking up an Object       Wheel 50 Feet with Two Turns   Wheelchair    Wheel 150 Feet       1 Step (Curb)   Stairs    4 Steps       12 Steps       Expression of Ideas and Wants   Communication    Understanding  Verbal and Non-Verbal Content       Cognitive Patterns   Cognition       Safety Precautions       Lower Extremity      Strength RLE Strength: 4-  LLE Strength: 4-      ROM       Upper Extremity      Strength       ROM      Care Score Value Definitions  6: Independent. [place] provides no assistance with tasks. A device may or may not have been used.  5: Set-up or clean-up  assistance. [place] sets up or cleans up, but does not assist with tasks. [place] may have assisted prior to or following the activity.  4: Supervision or touching assistance. [place] provides verbal cues or touching/steadying or contact guard assistance. Assistance may be provided throughout the activity or intermittently.  3: Partial/moderate assistance. [place] does less than half the effort. [place] lifts, holds, or supports trunk or limbs, but provides less than half the effort.  2: Substantial/maximal assistance. [place] does more than half the effort. [place] lifts or holds trunk or limbs, and provides more than half the effort.  1: Dependent. [place] does all of the effort, or the assistance of two or more helpers is required for the patient to complete the activity.  Care Score Activity Not Attempted Value Definitions  7: Patient refused.  9: Not applicable. Not attempted and the patient did not perform this activity prior to the current illness, exacerbation, or injury.  10: Not attempted due to environmental limitations (e.g., lack of equipment, weather constraints).  88: Not attempted due to medical condition or safety concerns.    DISCHARGE GOALS/ANTICPATED INTERVENTIONS/SERVICES     Expected Level of Improvement for Safe Discharge: Patient/caregivers anticipate discharge home at or near baseline level of functioning and/or decreased burden of care.       Patient/Family/Caregiver Goals: To be independent again   Required Treatments/Services: Rehabilitation Nursing, Respiratory Therapy, Dietitian/nutrition, Social , and Case Management   Required Therapy Therapy Type Min/Day Days/Week Duration of Therapy   Physical Therapy 90 5 10-12 days   Occupational Therapy 90 5 10-12 days   Speech and Language Pathology      Prosthetic/Orthotics      Recreational Therapy      Anticipated Services upon Discharge:  Home Health PT/OT vs outpatient therapy   Additional rehabilitation needs:  n/a   Expected Discharge  Destination:  home with family and HH   Barriers to Discharge: None   Discharge Support: Patient has a caregiver available   Patient/Family/Caregiver Orientation: Patient/family/caregiver orientation to be completed at a later date   Estimated Length of Stay: 10-12 days   Projected Admission Date: 04/04/2024   Medical Prognosis: good   Physicians Review and Admission Determination:

## 2024-04-04 NOTE — PT/OT/SLP PROGRESS
"Physical Therapy Treatment    Patient Name:  Ata Pickens   MRN:  [id number]    Recommendations:     Discharge therapy intensity: High Intensity Therapy   Discharge Equipment Recommendations:  (TBD)  Barriers to discharge: Ongoing medical needs    Assessment:     Ata Pickens is a 68 y.o. male admitted with a medical diagnosis of S/P CABG x 3.  He presents with the following impairments/functional limitations: weakness, impaired functional mobility, decreased safety awareness, impaired endurance      PT NOTE: Pt overall SBA. Pt family present and able to assist pt with all needs. Pt at first refusing to participate stating he just "took a S**t" However, will to participate following encouragement. Pt states he would like to go home and family appears to have good understanding of pt needs at home.    Rehab Prognosis: Good; patient would benefit from acute skilled PT services to address these deficits and reach maximum level of function.    Recent Surgery: Procedure(s) (LRB):  CORONARY ARTERY BYPASS GRAFT (CABG) (N/A)  SURGICAL PROCUREMENT, VEIN, ENDOSCOPIC (Left)  EXCLUSION, LEFT ATRIAL APPENDAGE (N/A) 10 Days Post-Op    Plan:     During this hospitalization, patient to be seen 5 x/week to address the identified rehab impairments via gait training, therapeutic activities, therapeutic exercises and progress toward the following goals:    Plan of Care Expires:  05/04/24    Subjective     Chief Complaint: N/A  Patient/Family Comments/goals: "To go home."   Pain/Comfort:  Pain Rating 1: 0/10      Objective:     Communicated with RN prior to session.  Patient found up in chair with telemetry, pulse ox (continuous), still catheter, peripheral IV, oxygen upon PT entry to room.     General Precautions: Standard, sternal  Orthopedic Precautions: N/A  Braces: N/A  Respiratory Status: Nasal cannula, flow 2 L/min (93% throughout and with ambulation. VC for pursed lip breathing)   Blood Pressure: N/A  Skin Integrity: " Dry      Functional Mobility:  Transfers:     Sit to Stand:  stand by assistance with rolling walker  Bed to Chair: stand by assistance with  rolling walker  using  Step Transfer  Gait: ~120' overall SBA with RW. VC for pursed lip breathing. Family member present throughout.        Education:  Patient provided with verbal education and demonstrations education regarding PT role/goals/POC, post-op precautions, fall prevention, and safety awareness.  Understanding was verbalized.     Patient left up in chair with all lines intact, call button in reach, and family memeber present    GOALS:   Multidisciplinary Problems       Physical Therapy Goals          Problem: Physical Therapy    Goal Priority Disciplines Outcome Goal Variances Interventions   Physical Therapy Goal     PT, PT/OT Ongoing, Progressing     Description: Goals to be met by: d/c     Patient will increase functional independence with mobility by performin. Supine to sit with Stand-by Assistance  2. Sit to supine with Stand-by Assistance  3. Sit to stand transfer with Stand-by Assistance  4. Gait  x 150 feet with Stand-by Assistance using Least Restrictive Assistive Device.   5. Ascend/descend 2 stair with unilateral railing Stand-by Assistance using No Assistive Device.                          Time Tracking:     PT Received On:    PT Start Time: 1347     PT Stop Time: 1406  PT Total Time (min): 19 min     Billable Minutes: Gait Training 19    Treatment Type: Treatment  PT/PTA: PT     Number of PTA visits since last PT visit: 2024

## 2024-04-04 NOTE — PLAN OF CARE
04/04/24 1624   Discharge Reassessment   Assessment Type Discharge Planning Reassessment   Discharge Plan discussed with: Adult children   Discharge Plan A Rehab   Discharge Plan B Home Health   DME Needed Upon Discharge  walker, rolling     Spoke to daughter per phone. Rehab still pending insurance. If insurance declines rehab, plan for discharge with home health. [facility] Spouse/grandson/daughter will assist during recovery. Patient will need a RW. Weaning oxygen.

## 2024-04-04 NOTE — PLAN OF CARE
Problem: Adult Inpatient Plan of Care  Goal: Plan of Care Review  Outcome: Ongoing, Progressing  Goal: Patient-Specific Goal (Individualized)  Outcome: Ongoing, Progressing  Goal: Absence of Hospital-Acquired Illness or Injury  Outcome: Ongoing, Progressing  Goal: Optimal Comfort and Wellbeing  Outcome: Ongoing, Progressing  Goal: Readiness for Transition of Care  Outcome: Ongoing, Progressing     Problem: Impaired Wound Healing  Goal: Optimal Wound Healing  Outcome: Ongoing, Progressing     Problem: Infection  Goal: Absence of Infection Signs and Symptoms  Outcome: Ongoing, Progressing     Problem: Skin Injury Risk Increased  Goal: Skin Health and Integrity  Outcome: Ongoing, Progressing     Problem: Fall Injury Risk  Goal: Absence of Fall and Fall-Related Injury  Outcome: Ongoing, Progressing     Problem: Activity Intolerance (Cardiovascular Surgery)  Goal: Improved Activity Tolerance  Outcome: Ongoing, Progressing     Problem: Adjustment to Surgery (Cardiovascular Surgery)  Goal: Optimal Coping with Heart Surgery  Outcome: Ongoing, Progressing     Problem: Bleeding (Cardiovascular Surgery)  Goal: Bleeding (Cardiovascular Surgery)  Outcome: Ongoing, Progressing     Problem: Bowel Motility Impaired (Cardiovascular Surgery)  Goal: Effective Bowel Elimination (Cardiovascular Surgery)  Outcome: Ongoing, Progressing     Problem: Cardiac Function Impaired (Cardiovascular Surgery)  Goal: Effective Cardiac Function  Outcome: Ongoing, Progressing     Problem: Cerebral Tissue Perfusion (Cardiovascular Surgery)  Goal: Optimal Cerebral Tissue Perfusion (Cardiovascular Surgery)  Outcome: Ongoing, Progressing     Problem: Fluid and Electrolyte Imbalance (Cardiovascular Surgery)  Goal: Fluid and Electrolyte Balance (Cardiovascular Surgery)  Outcome: Ongoing, Progressing     Problem: Glycemic Control Impaired (Cardiovascular Surgery)  Goal: Blood Glucose Level Within Targeted Range (Cardiovascular Surgery)  Outcome: Ongoing,  Progressing     Problem: Infection (Cardiovascular Surgery)  Goal: Absence of Infection Signs and Symptoms  Outcome: Ongoing, Progressing     Problem: Ongoing Anesthesia Effects (Cardiovascular Surgery)  Goal: Anesthesia/Sedation Recovery  Outcome: Ongoing, Progressing     Problem: Pain (Cardiovascular Surgery)  Goal: Acceptable Pain Control  Outcome: Ongoing, Progressing     Problem: Postoperative Urinary Retention (Cardiovascular Surgery)  Goal: Effective Urinary Elimination (Cardiovascular Surgery)  Outcome: Ongoing, Progressing     Problem: Respiratory Compromise (Cardiovascular Surgery)  Goal: Effective Oxygenation and Ventilation (Cardiovascular Surgery)  Outcome: Ongoing, Progressing

## 2024-04-05 VITALS
RESPIRATION RATE: 18 BRPM | HEART RATE: 76 BPM | DIASTOLIC BLOOD PRESSURE: 72 MMHG | BODY MASS INDEX: 21.56 KG/M2 | HEIGHT: 64 IN | WEIGHT: 126.31 LBS | TEMPERATURE: 98 F | SYSTOLIC BLOOD PRESSURE: 124 MMHG | OXYGEN SATURATION: 92 %

## 2024-04-05 PROBLEM — R94.8 ABNORMAL PET SCAN, MEDIASTINUM: Status: ACTIVE | Noted: 2024-04-05

## 2024-04-05 LAB
ALBUMIN SERPL-MCNC: 2.6 G/DL (ref 3.4–4.8)
ALBUMIN/GLOB SERPL: 0.8 RATIO (ref 1.1–2)
ALP SERPL-CCNC: 117 UNIT/L (ref 40–150)
ALT SERPL-CCNC: 47 UNIT/L (ref 0–55)
AST SERPL-CCNC: 38 UNIT/L (ref 5–34)
BASOPHILS # BLD AUTO: 0.11 X10(3)/MCL
BASOPHILS NFR BLD AUTO: 1.3 %
BILIRUB SERPL-MCNC: 0.8 MG/DL
BUN SERPL-MCNC: 19.5 MG/DL (ref 8.4–25.7)
CALCIUM SERPL-MCNC: 9.2 MG/DL (ref 8.8–10)
CHLORIDE SERPL-SCNC: 98 MMOL/L (ref 98–107)
CO2 SERPL-SCNC: 29 MMOL/L (ref 23–31)
CREAT SERPL-MCNC: 0.68 MG/DL (ref 0.73–1.18)
EOSINOPHIL # BLD AUTO: 0.11 X10(3)/MCL (ref 0–0.9)
EOSINOPHIL NFR BLD AUTO: 1.3 %
ERYTHROCYTE [DISTWIDTH] IN BLOOD BY AUTOMATED COUNT: 13 % (ref 11.5–17)
GFR SERPLBLD CREATININE-BSD FMLA CKD-EPI: >60 MLS/MIN/1.73/M2
GLOBULIN SER-MCNC: 3.2 GM/DL (ref 2.4–3.5)
GLUCOSE SERPL-MCNC: 96 MG/DL (ref 82–115)
HCT VFR BLD AUTO: 32.2 % (ref 42–52)
HGB BLD-MCNC: 10.6 G/DL (ref 14–18)
IMM GRANULOCYTES # BLD AUTO: 0.04 X10(3)/MCL (ref 0–0.04)
IMM GRANULOCYTES NFR BLD AUTO: 0.5 %
LYMPHOCYTES # BLD AUTO: 0.95 X10(3)/MCL (ref 0.6–4.6)
LYMPHOCYTES NFR BLD AUTO: 11.2 %
MCH RBC QN AUTO: 31.1 PG (ref 27–31)
MCHC RBC AUTO-ENTMCNC: 32.9 G/DL (ref 33–36)
MCV RBC AUTO: 94.4 FL (ref 80–94)
MONOCYTES # BLD AUTO: 0.5 X10(3)/MCL (ref 0.1–1.3)
MONOCYTES NFR BLD AUTO: 5.9 %
NEUTROPHILS # BLD AUTO: 6.76 X10(3)/MCL (ref 2.1–9.2)
NEUTROPHILS NFR BLD AUTO: 79.8 %
NRBC BLD AUTO-RTO: 0 %
PLATELET # BLD AUTO: 532 X10(3)/MCL (ref 130–400)
PMV BLD AUTO: 9.6 FL (ref 7.4–10.4)
POTASSIUM SERPL-SCNC: 3.8 MMOL/L (ref 3.5–5.1)
PROT SERPL-MCNC: 5.8 GM/DL (ref 5.8–7.6)
RBC # BLD AUTO: 3.41 X10(6)/MCL (ref 4.7–6.1)
SODIUM SERPL-SCNC: 136 MMOL/L (ref 136–145)
WBC # SPEC AUTO: 8.47 X10(3)/MCL (ref 4.5–11.5)

## 2024-04-05 PROCEDURE — 97164 PT RE-EVAL EST PLAN CARE: CPT

## 2024-04-05 PROCEDURE — 25000003 PHARM REV CODE 250: Performed by: NURSE PRACTITIONER

## 2024-04-05 PROCEDURE — 97535 SELF CARE MNGMENT TRAINING: CPT | Mod: CO

## 2024-04-05 PROCEDURE — 94760 N-INVAS EAR/PLS OXIMETRY 1: CPT

## 2024-04-05 PROCEDURE — 85025 COMPLETE CBC W/AUTO DIFF WBC: CPT

## 2024-04-05 PROCEDURE — 97110 THERAPEUTIC EXERCISES: CPT

## 2024-04-05 PROCEDURE — 80053 COMPREHEN METABOLIC PANEL: CPT

## 2024-04-05 PROCEDURE — 25000003 PHARM REV CODE 250

## 2024-04-05 PROCEDURE — 25000003 PHARM REV CODE 250: Performed by: PHYSICIAN ASSISTANT

## 2024-04-05 RX ORDER — AMIODARONE HYDROCHLORIDE 200 MG/1
TABLET ORAL
Qty: 92 TABLET | Refills: 0 | Status: SHIPPED | OUTPATIENT
Start: 2024-04-05 | End: 2024-07-05

## 2024-04-05 RX ORDER — ATORVASTATIN CALCIUM 40 MG/1
40 TABLET, FILM COATED ORAL DAILY
Qty: 90 TABLET | Refills: 3 | Status: SHIPPED | OUTPATIENT
Start: 2024-04-06 | End: 2025-04-06

## 2024-04-05 RX ADMIN — PANTOPRAZOLE SODIUM 40 MG: 40 TABLET, DELAYED RELEASE ORAL at 09:04

## 2024-04-05 RX ADMIN — ATORVASTATIN CALCIUM 40 MG: 40 TABLET, FILM COATED ORAL at 09:04

## 2024-04-05 RX ADMIN — APIXABAN 5 MG: 5 TABLET, FILM COATED ORAL at 09:04

## 2024-04-05 RX ADMIN — METOPROLOL SUCCINATE 12.5 MG: 25 TABLET, EXTENDED RELEASE ORAL at 09:04

## 2024-04-05 RX ADMIN — DOCUSATE SODIUM 100 MG: 50 CAPSULE, LIQUID FILLED ORAL at 09:04

## 2024-04-05 RX ADMIN — HYDROCODONE BITARTRATE AND ACETAMINOPHEN 1 TABLET: 5; 325 TABLET ORAL at 04:04

## 2024-04-05 RX ADMIN — AMIODARONE HYDROCHLORIDE 200 MG: 200 TABLET ORAL at 09:04

## 2024-04-05 RX ADMIN — AMLODIPINE BESYLATE 5 MG: 5 TABLET ORAL at 09:04

## 2024-04-05 RX ADMIN — ASPIRIN 81 MG: 81 TABLET, COATED ORAL at 09:04

## 2024-04-05 RX ADMIN — FOLIC ACID 1 MG: 1 TABLET ORAL at 09:04

## 2024-04-05 NOTE — PLAN OF CARE
04/05/24 1030   Discharge Reassessment   Assessment Type Discharge Planning Reassessment   Discharge Plan discussed with: Adult children;Patient   Discharge Plan A Home Health   Discharge Plan B Home Health   DME Needed Upon Discharge  walker, rolling   Post-Acute Status   Post-Acute Authorization Home Health     Received message from Celina at [facility]. Rehab request was faxed to insurance on Monday. Celina contacted insurance on yesterday and insurance claimed that request was never received. Rehab request was faxed again yesterday and Celina received a fax from insurance that was basically blank.  Informed patient /daughter/grandson. They would like to discharge home today with home health. Patient is set up with [facility] in [place]. PT recommended a RW. Referral sent to [employer] via [facility]. Patient received a rollator in 2020 and does not qualify for another until 2024. Grandson states that he will purchase a RW.

## 2024-04-05 NOTE — DISCHARGE SUMMARY
"  [facility] 6th Floor Medical Telemetry    Cardiology  Discharge Summary      Patient Name: Ata Pickens  MRN: [id number]  Admission Date: 3/21/2024  Hospital Length of Stay: 15 days  Discharge Date and Time:  04/05/2024 12:56 PM  Attending Physician: Monse Lima MD  Discharging Provider: Carissa Barrientos NP  Primary Care Physician: Lesa Stephen FNP    Chief Complaint:  Outpatient Cath- MV CAD- CABG     HPI: Mr. Pickens is a 68 year old male, known to Dr. Lima, who presented to the hospital and underwent elective coronary angiogram due to diagnosis of abnormal stress test which was performed in the outpatient setting due to reported history of chest pain, SOB, and fatigue with minimal exertion. He underwent cath on 3.21.24 revealing MV CAD including 90% distal LM/Ostial LAD Disease. Patient's EF on LV Gram noted to be 50%. Patient was admitted to [facility]. CT Surgical services consulted for CABG evaluation.       Hospital Course:   3.22.24: NAD Noted. SB on Tele. BP Stable. CP This AM, Went away on its own. No CP Currently. Right Radial Site soft.  3.23.24: NAD Noted. SR on Tele. BP Stable. On Heparin Infusion.  3.24.24: NAD Noted. Vitals Stable. SR on Tele.  3.25.24: NAD Noted. CABG Today. Tolerated well. SR on Tele. On Cleviprex Infusion. CO/CI 4.9/3.3.   3.26.24: NAD Noted. Progressing well. SR on Tele. Not requiring Pressors.  3.27.24: NAD Noted. BP Stable, some intermittent hypertension. SR on Tele. Progressing well.   3.28.24: NAD Noted. Progressing well. CT Out today. SR on Tele. Some Post Op Incisional discomfort but overall doing well.   3.29.24: NAD. SR on tele. + Incisional CP. On 3 L NC. Daily net negative 1048 mL/24 hours. Labs pending. "I feel okay."   3.30.24: NAD. SR on tele. + Incisional CP. Denies SOB or palps. "I'm feeling fine." BP stable.   3.31.24: NAD.  SR on tele. Denies SOB or palps. Requiring 3 L NC. + Incisional CP. BP stable.   4.1.24: NAD. SR on " tele. BP stable. 2 L NC. + Incisional CP. Denies SOB or palps. BP stable. SR on tele. Celeste catheter in place.   4.2.24: NAD. SR on tele. BP stable. 2 L NC. Denies SOB or palps. Remains with weakness. Celeste catheter in place.   4.3.24: NAD. SR on tele. BP stable 3 L NC. Denies SOB or palps. Placement pending.   4.4.24: NAD. SR on tele. On 2 L NC. Denies SOB or palps. Reports that he feels he is getting strong. Rehab placement pending.   4.5.24: NAD, VSS, NSR on tele review. He denies He denies SOB, CP, or palpitations. Plan to go home with home health.       PMH: Hypertension, Claudication, SOB, CP, Chronic Tobacco Use  PSH: Wrist Surgery, Colostomy Closure  Family History: Father- Lung Cancer, Mother- Lung Cancer, Brother- Cardiac Pacemaker  Social History: Tobacco- Active Smoker, Alcohol- Negative, Substance Abuse- Negative     Previous Cardiac Diagnostics:   CABG (3.25.24):  Procedure:  Coronary artery bypass grafting X 3 ,   Left internal mammary artery to the LAD   Saphenous venous graft to OM  Saphenous venous graft to distal RCA  Ligation of left atrial appendage with a 35 mm atrial clip  Endoscopic venous harvesting left greater saphenous vein     Coronary Angiogram (3.21.24):  Coronary findings:  Dominance: right   Left main:  Calcified distal 90% stenosis extending ostium of the LAD.    Left anterior descending artery:  Type three-vessel with calcified ostial stenosis of at least 70%.    Circumflex artery:  Nondominant calcified vessel with proximal 40-50% stenosis.  The circumflex gives rise to a large patent bifurcating OM.  Right coronary artery:  Calcified dominant vessel with serial heavily calcified segment stenosis of up to 80-90%.  The RCA terminates in a moderate bifurcating PDA and a small PL segment.    Selective LIMA angiogram:  Large widely patent vessel  Aortic root angiogram:  Calcified Type 3 bovine arch with patent innominate and proximal ICA bilateral.  Abdominal angiogram: No AAA.  Left  iliac system patent.  Left common femoral artery patent proximally.  Right common and internal iliac patent.  Right external iliac with 70-80% stenosis at the pelvic brim.  Right common femoral occluded.  Left ventriculography:  EF- 50%.    Hemodynamics:LV/AO= 0 mmHg                      LVEDP= 13-16 mmHg       Carotid US (3.21.24):  The right internal carotid artery demonstrated 50-69% stenosis.   The left internal carotid artery demonstrated less than 50% stenosis.  Bilateral vertebral arteries were patent with antegrade flow.     Echo (3.5.24):  The left ventricle is decreased in size. Global left ventricular systolic function is borderline normal. The left ventricular ejection fraction is 50%. The left ventricle diastolic function is impaired (Grade I) with normal left atrial pressure.   The right atrium is moderately enlarged ~5.1 cm.  Mild (1+) tricuspid and trace mitral regurgitation.  The pulmonary artery systolic pressure is 23 mmHg.  The study quality is below average.      CV US Arterial Lower Extremities (3.5.24):  The study quality is good.   The right proximal superficial femoral artery is occluded.   The right mid and distal superficial femoral, posterior tibial, peroneal, anterior tibial, and dorsalis pedis arteries exhibit poor, diminished perfusion via collateral flow.  The right lower extremity arteries exhibit mono-phasic waveforms.  The left external iliac and common femoral arteries exhibit mono-phasic waveforms.  The remaining arteries of the left lower extremity exhibit bi-phasic waveforms.      PET (3.1.24):  This is an abnormal perfusion study. Study is consistent with significant anterior  ischemia.   This scan is suggestive of moderate to high risk for future cardiovascular events.   Large partially reversible perfusion abnormality of severe intensity in the anterior apical region. Large partially reversible perfusion abnormality of severe intensity in the anterior septal region. Small  reversible perfusion abnormality of severe intensity in the apical lateral segment.   The left ventricular cavity is noted to be normal on the stress studies. The stress left ventricular ejection fraction was calculated to be 37% and left ventricular global function is moderately reduced. The rest left ventricular cavity is noted to be normal. The rest left ventricular ejection fraction was calculated to be 52% and rest left ventricular global function is normal.   Hypokinesis of apical anterior segment is noted only in the stress studies which is suggestive of new ischemia. Persistent hypokinesis of the septal region is noted in both rest and stress studies. When compared to the resting ejection fraction (52%), the stress ejection fraction (37%) has decreased.   Transient ischemic dilatation is present and has been described as a marker for high risk coronary artery disease. It has also been described in microvascular disease, hypertensive heart disease as well as cardiac deconditioning.   The study quality is good.   There was no rise in myocardial blood flow between rest and stress.  Global myocardial blood flow reserve was 0.83.  Myocardial blood flow reserve is globally abnormal, placing the patient at a higher coronary event risk.    Consults:   Consults (From admission, onward)          Status Ordering Provider     Inpatient consult to Social Work/Case Management  Once        Provider:  (Not yet assigned)    Acknowledged ELSA HOANG     Inpatient consult to Social Work/Case Management  Once        Provider:  (Not yet assigned)    Acknowledged SANDRA FERMIN     Inpatient consult to Cardiothoracic Surgery  Once        Provider:  Reji Sullivan MD    Completed GLORIA CARRILLO          Final Active Diagnoses:    Diagnosis Date Noted POA    PRINCIPAL PROBLEM:  Abnormal PET scan, mediastinum [R94.8] 04/05/2024 Unknown      Problems Resolved During this Admission:     Discharged Condition:  good    Review of Systems   Constitutional: Negative for chills and fever.   Cardiovascular:  Negative for chest pain, palpitations and syncope.   Respiratory:  Negative for cough, shortness of breath and wheezing.    Skin: Negative.    Musculoskeletal: Negative.    Gastrointestinal:  Negative for abdominal pain, nausea and vomiting.   Genitourinary: Negative.    Neurological: Negative.    Psychiatric/Behavioral: Negative.         Physical Exam  Constitutional:       Appearance: Normal appearance.   HENT:      Head: Normocephalic.      Nose: Nose normal.      Mouth/Throat:      Mouth: Mucous membranes are moist.   Cardiovascular:      Rate and Rhythm: Normal rate and regular rhythm.      Pulses: Normal pulses.      Heart sounds: Normal heart sounds. No murmur heard.  Pulmonary:      Effort: Pulmonary effort is normal. No respiratory distress.      Breath sounds: Normal breath sounds.   Abdominal:      General: Abdomen is flat. There is no distension.      Palpations: Abdomen is soft.   Skin:     General: Skin is warm.   Neurological:      Mental Status: He is alert and oriented to person, place, and time.   Psychiatric:         Mood and Affect: Mood normal.         Behavior: Behavior normal.         Judgment: Judgment normal.         Disposition: Home with home health  Follow Up:   Follow-up Information       Rachana Salgado [facility] - Follow up.    Why: Home health agency will contact you with admit appointment  Contact information:  [address]  [place] [address]  [phone]               Lesa Stephen FNP Follow up on 4/9/2024.    Why: @2:30  Contact information:  [address] [facility] [address]  [phone]               Reji Sullivan MD Follow up on 5/1/2024.    Specialty: Cardiothoracic Surgery  Why: @8:50  Contact information:  [address] [place] [address]  [address]  [facility] [address]  [phone]               Rolando Neff FNP. Go on 4/18/2024.    Specialty: Cardiology  Why:  "@9:15  Contact information:  [address]  [address]  [place] [address]  [phone]                           Patient Instructions:      WALKER FOR HOME USE     Order Specific Question Answer Comments   Type of Walker: Adult (5'4"-6'6")    With wheels? Yes    Height: 5' 4" (1.626 m)    Weight: 57.3 kg (126 lb 5.2 oz)    Length of need (1-99 months): 99    Does patient have medical equipment at home? none    Please check all that apply: Patient's condition impairs ambulation.    Please check all that apply: Patient is unable to safely ambulate without equipment.      SUBSEQUENT HOME HEALTH ORDERS   Order Comments: Home Health evaluate and treat  Skilled nurse for management of disease process, medication management, and education.  PT evaluate and treat - for ambulation, balance, strengthening, and safety assessment.     Order Specific Question Answer Comments   What Home Health Agency is the patient currently using? Other/External      Medications:  Reconciled Home Medications:      Medication List        START taking these medications      metoprolol succinate 25 MG 24 hr tablet  Commonly known as: TOPROL-XL  Take 1 tablet (25 mg total) by mouth once daily.            CONTINUE taking these medications      amLODIPine 10 MG tablet  Commonly known as: NORVASC  Take 10 mg by mouth once daily.     aspirin 81 MG EC tablet  Commonly known as: ECOTRIN  Take 81 mg by mouth once daily.            STOP taking these medications      metoprolol tartrate 25 MG tablet  Commonly known as: LOPRESSOR              Impression:  CAD (Multivessel)- Status Post CABG X3   - CABG x3(3.25.24) LIMA to LAD, SVG to OM, SVG to Distal RCA (HANK Ligation)    - [facility] (3.21.24):LM: 90% Distal, LAD: 70% Ostial, LCX: 40-50% Proximal, RCA: (Dominant) 80-90% Serially Calcified Lesions (EF 50%)   Newly Diagnosed Afib CVR - now SR    - CHADSVASC Score 3 Points   HTN  PAD    - REIA 70-80% Stenosis at Pelvic Brim (3.21.24)  Elevated Liver Enzymes - " Improving   Urinary Retention     - Celeste Catheter in place  Nicotine Dependence/Chronic Tobacco Use  ROBBI    - 50-69% MUSTAPHA (CUS 3.21.24)  Anemia  Thrombocytopenia - Resolved     Plan:   Cont. Aspirin, statin, BB, & amlodipine 5 mg daily.   Cont. PO amio load:  amio 200 mg BID x 3 days, then amio 200 mg daily. Cont. to monitor for AF on tele.   Cont.Eliquis for CVA prophylaxis in the setting of PAF.   Advance Mobilization as Able and Continue Regular IS Usage  Ensure accurate I&O's and daily weights.   Celeste remains in place. Plans to f/u with Urology in the outpatient setting and discharge with Celeste.   Patient to go home with home health   Patient to follow up in clinic       Time spent on the discharge of patient: 37 minutes    Carissa Barrientos NP  Cardiology  [facility] - 6th Floor Medical Telemetry

## 2024-04-05 NOTE — PROGRESS NOTES
04/05/24 1035   Pre Exercise Vitals   /73   Pulse 74   Supplemental O2? No   SpO2 92 %   During Exercise Vitals   Pulse 80   Supplemental O2? No   SpO2   (91-92)   Distance Walked 100 feet   Post Exercise Vitals   /74   Pulse 76   Supplemental O2? No   SpO2 92 %   Modality   Modality   (rollator)     Min assist x1 from sitting to standing. Sternal precautions maintained and reinforced. Gait steady, slow. Early fatigue. Encouraged incentive spirometer q 1 hour. Reinforced all postop instructions with pt and nephew. Communicated with nurse pre and post walk.
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
## 2024-04-05 NOTE — PLAN OF CARE
04/05/24 1601   Final Note   Assessment Type Final Discharge Note   Anticipated Discharge Disposition Home-Health   Post-Acute Status   Post-Acute Authorization Home Health     Discharge documentation sent to [facility] via [facility].

## 2024-04-05 NOTE — PT/OT/SLP PROGRESS
Occupational Therapy   Treatment    Name: Ata Pickens  MRN: [id number]  Admitting Diagnosis:  Abnormal PET scan, mediastinum  11 Days Post-Op    Recommendations:     Recommended therapy intensity at discharge: High Intensity Therapy   Discharge Equipment Recommendations:  bath bench  Barriers to discharge:       Assessment:     Ata Pickens is a 68 y.o. male with a medical diagnosis of Abnormal PET scan, mediastinum.  Performance deficits affecting function are weakness, impaired endurance, impaired self care skills, impaired functional mobility, orthopedic precautions, pain, decreased safety awareness, impaired cardiopulmonary response to activity, decreased upper extremity function.     Rehab Prognosis:  Fair; patient would benefit from acute skilled OT services to address these deficits and reach maximum level of function.       Plan:     Patient to be seen 4 x/week to address the above listed problems via self-care/home management, therapeutic activities, therapeutic exercises  Plan of Care Expires: 04/25/24  Plan of Care Reviewed with: patient, family    Subjective     Pain/Comfort:       Objective:     Communicated with: RN prior to session.  Patient found up in chair with   upon OT entry to room.    General Precautions: Standard, sternal    Orthopedic Precautions:N/A  Braces: N/A  Respiratory Status: Room air  Vital Signs: Blood Pressure: 148/72  HR: 76  Sp02: 92     Occupational Performance:     Functional Mobility/Transfers:  Patient declining mobilizing at this time    Activities of Daily Living:  Lower Body Dressing: independence donning/doffing B socks in supported long sit in chair    Patient Education:  Patient provided with verbal education education regarding OT role/goals/POC.  Understanding was verbalized.      Patient left up in chair with all lines intact and call button in reach.    GOALS:   Multidisciplinary Problems       Occupational Therapy Goals          Problem: Occupational Therapy     Goal Priority Disciplines Outcome Interventions   Occupational Therapy Goal     OT, PT/OT Ongoing, Progressing    Description: LTG: Pt will perform basic ADLs and ADL transfers with Modified independence using LRAD by discharge.    STG: to be met by 4/25/24    Pt will complete grooming standing at sink with LRAD with SBA.  Pt will complete UB dressing with SBA.  Pt will complete LB dressing with SBA using LRAD and AE prn.  Pt will complete toileting with SBA using LRAD.  Pt will complete functional mobility to/from toilet and toilet transfer with SBA using LRAD.                        Time Tracking:     OT Date of Treatment: 04/05/24  OT Start Time: 0941  OT Stop Time: 0951  OT Total Time (min): 10 min    Billable Minutes:Self Care/Home Management 1    OT/MARY ANNE: MARY ANNE     Number of MARY ANNE visits since last OT visit: 3    4/5/2024

## 2024-04-06 PROCEDURE — G0180 MD CERTIFICATION HHA PATIENT: HCPCS | Mod: ,,, | Performed by: THORACIC SURGERY (CARDIOTHORACIC VASCULAR SURGERY)

## 2024-04-08 ENCOUNTER — PATIENT OUTREACH (OUTPATIENT)
Dept: ADMINISTRATIVE | Facility: CLINIC | Age: 69
End: 2024-04-08
Payer: COMMERCIAL

## 2024-04-08 NOTE — PROGRESS NOTES
C3 nurse attempted to contact Ata Pickens for a TCC post hospital discharge follow up call. No answer. Left voicemail with callback information. The patient has a scheduled HOSFU appointment with Lesa Stephen FNP on 4/9/24 @ 2:30.

## 2024-04-08 NOTE — PROGRESS NOTES
C3 nurse spoke with Ata Pickens for a TCC post hospital discharge follow up call. The patient has a scheduled HOSFU appointment with Lesa Stephen FNP on 4/9/24 @ 2:30.

## 2024-04-22 ENCOUNTER — EXTERNAL HOME HEALTH (OUTPATIENT)
Dept: HOME HEALTH SERVICES | Facility: HOSPITAL | Age: 69
End: 2024-04-22
Payer: COMMERCIAL

## 2024-05-01 ENCOUNTER — OFFICE VISIT (OUTPATIENT)
Dept: CARDIAC SURGERY | Facility: CLINIC | Age: 69
End: 2024-05-01
Payer: COMMERCIAL

## 2024-05-01 VITALS
DIASTOLIC BLOOD PRESSURE: 79 MMHG | SYSTOLIC BLOOD PRESSURE: 163 MMHG | RESPIRATION RATE: 20 BRPM | HEIGHT: 64 IN | BODY MASS INDEX: 18.44 KG/M2 | OXYGEN SATURATION: 96 % | HEART RATE: 70 BPM | WEIGHT: 108 LBS

## 2024-05-01 DIAGNOSIS — R94.8 ABNORMAL PET SCAN, MEDIASTINUM: Primary | ICD-10-CM

## 2024-05-01 PROCEDURE — 3077F SYST BP >= 140 MM HG: CPT | Mod: CPTII,,, | Performed by: THORACIC SURGERY (CARDIOTHORACIC VASCULAR SURGERY)

## 2024-05-01 PROCEDURE — 3078F DIAST BP <80 MM HG: CPT | Mod: CPTII,,, | Performed by: THORACIC SURGERY (CARDIOTHORACIC VASCULAR SURGERY)

## 2024-05-01 PROCEDURE — 99024 POSTOP FOLLOW-UP VISIT: CPT | Mod: ,,, | Performed by: THORACIC SURGERY (CARDIOTHORACIC VASCULAR SURGERY)

## 2024-05-01 PROCEDURE — 1160F RVW MEDS BY RX/DR IN RCRD: CPT | Mod: CPTII,,, | Performed by: THORACIC SURGERY (CARDIOTHORACIC VASCULAR SURGERY)

## 2024-05-01 PROCEDURE — 1159F MED LIST DOCD IN RCRD: CPT | Mod: CPTII,,, | Performed by: THORACIC SURGERY (CARDIOTHORACIC VASCULAR SURGERY)

## 2024-05-01 PROCEDURE — 3288F FALL RISK ASSESSMENT DOCD: CPT | Mod: CPTII,,, | Performed by: THORACIC SURGERY (CARDIOTHORACIC VASCULAR SURGERY)

## 2024-05-01 PROCEDURE — 1101F PT FALLS ASSESS-DOCD LE1/YR: CPT | Mod: CPTII,,, | Performed by: THORACIC SURGERY (CARDIOTHORACIC VASCULAR SURGERY)

## 2024-05-01 RX ORDER — TAMSULOSIN HYDROCHLORIDE 0.4 MG/1
0.4 CAPSULE ORAL DAILY
COMMUNITY

## 2024-05-01 NOTE — PROGRESS NOTES
"Ata Pickens is a 68 y.o. male patient.   No diagnosis found.  Past Medical History:   Diagnosis Date    COPD (chronic obstructive pulmonary disease)     Hypertension      No past surgical history pertinent negatives on file.  Scheduled Meds:  Continuous Infusions:  PRN Meds:    Review of patient's allergies indicates:   Allergen Reactions    Codeine      There are no hospital problems to display for this patient.    Blood pressure (!) 163/79, pulse 70, resp. rate 20, height 5' 4" (1.626 m), weight 49 kg (108 lb), SpO2 96%.    Subjective:  The patient is doing very well status post coronary artery bypass grafting.  He has no complaints.       Objective:  His wounds have healed well his sternum is stable.      Assessment & Plan:  Overall doing very well RTC p.r.n.      Reji Sullivan MD  5/1/2024    "

## 2024-05-05 ENCOUNTER — HOSPITAL ENCOUNTER (EMERGENCY)
Facility: HOSPITAL | Age: 69
Discharge: HOME | End: 2024-05-05
Attending: INTERNAL MEDICINE
Payer: COMMERCIAL

## 2024-05-05 VITALS
WEIGHT: 110.88 LBS | HEIGHT: 64 IN | HEART RATE: 61 BPM | BODY MASS INDEX: 18.93 KG/M2 | SYSTOLIC BLOOD PRESSURE: 119 MMHG | OXYGEN SATURATION: 93 % | RESPIRATION RATE: 16 BRPM | TEMPERATURE: 97 F | DIASTOLIC BLOOD PRESSURE: 67 MMHG

## 2024-05-05 DIAGNOSIS — R05.9 COUGH: ICD-10-CM

## 2024-05-05 DIAGNOSIS — R04.2 HEMOPTYSIS: ICD-10-CM

## 2024-05-05 DIAGNOSIS — I50.9 CONGESTIVE HEART FAILURE, UNSPECIFIED HF CHRONICITY, UNSPECIFIED HEART FAILURE TYPE: ICD-10-CM

## 2024-05-05 DIAGNOSIS — J90 PLEURAL EFFUSION: Primary | ICD-10-CM

## 2024-05-05 LAB
ALBUMIN SERPL-MCNC: 3.9 G/DL (ref 3.4–4.8)
ALBUMIN/GLOB SERPL: 1.1 RATIO (ref 1.1–2)
ALP SERPL-CCNC: 114 UNIT/L (ref 40–150)
ALT SERPL-CCNC: 22 UNIT/L (ref 0–55)
APTT PPP: 31.5 SECONDS (ref 24.6–37.2)
AST SERPL-CCNC: 19 UNIT/L (ref 5–34)
BASOPHILS # BLD AUTO: 0.07 X10(3)/MCL
BASOPHILS NFR BLD AUTO: 0.9 %
BILIRUB SERPL-MCNC: 0.6 MG/DL
BNP BLD-MCNC: 294.2 PG/ML
BUN SERPL-MCNC: 16 MG/DL (ref 8.4–25.7)
CALCIUM SERPL-MCNC: 9.5 MG/DL (ref 8.8–10)
CHLORIDE SERPL-SCNC: 104 MMOL/L (ref 98–107)
CO2 SERPL-SCNC: 26 MMOL/L (ref 23–31)
CREAT SERPL-MCNC: 1.43 MG/DL (ref 0.73–1.18)
D DIMER PPP IA.FEU-MCNC: 1.87 UG/ML FEU (ref 0–0.5)
EOSINOPHIL # BLD AUTO: 0.36 X10(3)/MCL (ref 0–0.9)
EOSINOPHIL NFR BLD AUTO: 4.8 %
ERYTHROCYTE [DISTWIDTH] IN BLOOD BY AUTOMATED COUNT: 14.2 % (ref 11.5–17)
GFR SERPLBLD CREATININE-BSD FMLA CKD-EPI: 53 MLS/MIN/1.73/M2
GLOBULIN SER-MCNC: 3.6 GM/DL (ref 2.4–3.5)
GLUCOSE SERPL-MCNC: 122 MG/DL (ref 82–115)
HCT VFR BLD AUTO: 37.8 % (ref 42–52)
HGB BLD-MCNC: 12.5 G/DL (ref 14–18)
IMM GRANULOCYTES # BLD AUTO: 0.05 X10(3)/MCL (ref 0–0.04)
IMM GRANULOCYTES NFR BLD AUTO: 0.7 %
INR PPP: 1.1
LYMPHOCYTES # BLD AUTO: 2.07 X10(3)/MCL (ref 0.6–4.6)
LYMPHOCYTES NFR BLD AUTO: 27.7 %
MCH RBC QN AUTO: 31.2 PG (ref 27–31)
MCHC RBC AUTO-ENTMCNC: 33.1 G/DL (ref 33–36)
MCV RBC AUTO: 94.3 FL (ref 80–94)
MONOCYTES # BLD AUTO: 0.63 X10(3)/MCL (ref 0.1–1.3)
MONOCYTES NFR BLD AUTO: 8.4 %
NEUTROPHILS # BLD AUTO: 4.29 X10(3)/MCL (ref 2.1–9.2)
NEUTROPHILS NFR BLD AUTO: 57.5 %
PLATELET # BLD AUTO: 269 X10(3)/MCL (ref 130–400)
PMV BLD AUTO: 9.6 FL (ref 7.4–10.4)
POTASSIUM SERPL-SCNC: 4 MMOL/L (ref 3.5–5.1)
PROT SERPL-MCNC: 7.5 GM/DL (ref 5.8–7.6)
PROTHROMBIN TIME: 14 SECONDS (ref 12.5–14.5)
RBC # BLD AUTO: 4.01 X10(6)/MCL (ref 4.7–6.1)
SODIUM SERPL-SCNC: 141 MMOL/L (ref 136–145)
TROPONIN I SERPL-MCNC: 0.03 NG/ML (ref 0–0.04)
WBC # SPEC AUTO: 7.47 X10(3)/MCL (ref 4.5–11.5)

## 2024-05-05 PROCEDURE — 80053 COMPREHEN METABOLIC PANEL: CPT | Performed by: INTERNAL MEDICINE

## 2024-05-05 PROCEDURE — 94761 N-INVAS EAR/PLS OXIMETRY MLT: CPT

## 2024-05-05 PROCEDURE — 99285 EMERGENCY DEPT VISIT HI MDM: CPT | Mod: 25

## 2024-05-05 PROCEDURE — 93005 ELECTROCARDIOGRAM TRACING: CPT

## 2024-05-05 PROCEDURE — 84484 ASSAY OF TROPONIN QUANT: CPT | Performed by: INTERNAL MEDICINE

## 2024-05-05 PROCEDURE — 85610 PROTHROMBIN TIME: CPT | Performed by: INTERNAL MEDICINE

## 2024-05-05 PROCEDURE — 83880 ASSAY OF NATRIURETIC PEPTIDE: CPT | Performed by: INTERNAL MEDICINE

## 2024-05-05 PROCEDURE — 85025 COMPLETE CBC W/AUTO DIFF WBC: CPT | Performed by: INTERNAL MEDICINE

## 2024-05-05 PROCEDURE — 85379 FIBRIN DEGRADATION QUANT: CPT | Performed by: INTERNAL MEDICINE

## 2024-05-05 PROCEDURE — 85730 THROMBOPLASTIN TIME PARTIAL: CPT | Performed by: INTERNAL MEDICINE

## 2024-05-05 PROCEDURE — 93010 ELECTROCARDIOGRAM REPORT: CPT | Mod: ,,, | Performed by: INTERNAL MEDICINE

## 2024-05-05 RX ORDER — FUROSEMIDE 40 MG/1
40 TABLET ORAL DAILY
Qty: 30 TABLET | Refills: 11 | Status: SHIPPED | OUTPATIENT
Start: 2024-05-05 | End: 2025-05-05

## 2024-05-06 ENCOUNTER — DOCUMENT SCAN (OUTPATIENT)
Dept: HOME HEALTH SERVICES | Facility: HOSPITAL | Age: 69
End: 2024-05-06
Payer: COMMERCIAL

## 2024-05-06 LAB
OHS QRS DURATION: 98 MS
OHS QTC CALCULATION: 481 MS

## 2024-05-06 NOTE — CONSULTS
Inpatient consult to Telemedicine-Card  Consult performed by: Otto Flores AGACNP-BC  Consult ordered by: Asher Flowers MD        Telecardiology Consult  From: [facility] - Emergency Dept  DOS: 5/5/2024   Requesting Physician: Dr. Flowers  Reason for consult: hemoptysis, recent CABG  ER consult  Duration of consult: 36 minutes      [facility] - Emergency Dept  Telecardiology  Consult Note    Patient Name: Ata Pickens  MRN: [id number]  Admission Date: 5/5/2024  Hospital Length of Stay: 0 days  Code Status: Prior   Attending Provider: Asher Flowers MD   Consulting Provider: JAYSON Pereira  Primary Care Physician: Lesa Stephen FNP  Principal Problem:<principal problem not specified>    Patient information was obtained from patient, past medical records, and ER records.     Subjective:     Chief Complaint:  Consulted for hemoptysis, recent CABG, and worsening R pleural effusion     HPI:   This is a 68 year old male, who is known to Dr. Oconnell, with a history CAD/CABG x 3 (LIMA to LAD, SVG to OM, and SBG to dRCA), post-op Afib, HTN, PAD, HLD, ROBBI, Tobacco use (recently quit), Anemia. BPH. He presented to the ER with complaints of hemoptysis. He stated that he took a nebulizer treatment when he coughed and had dark blood in his sputum. He stated he then had streaky bright red blood in his sputum for approximately 30 minutes and then it resolved. He currently has clear sputum. CXR revealed bilateral pleural effusion with worsening of the right side. He denied any SOB or orthopnea. BNP was mildly elevated at 294.2. Troponin was negative. EKG revealed SR without evidence of acute ischemia. [facility] has been consulted for hemoptysis, recent CABG, and worsening R pleural effusion      PMH: Hypertension, Claudication, SOB, CP, Chronic Tobacco Use  PSH: Wrist Surgery, Colostomy Closure  Family History: Father- Lung Cancer, Mother- Lung Cancer, Brother- Cardiac  Pacemaker  Social History: Tobacco- Active Smoker, Alcohol- Negative, Substance Abuse- Negative     Previous Cardiac Diagnostics:   CABG (3.25.24):  Procedure:  Coronary artery bypass grafting X 3 ,   Left internal mammary artery to the LAD   Saphenous venous graft to   OM  Saphenous venous graft to    distal RCA  Ligation of left atrial appendage with a 35 mm atrial clip  Endoscopic venous harvesting left greater saphenous vein     Coronary Angiogram (3.21.24):  Coronary findings:  Dominance: right   Left main:  Calcified distal 90% stenosis extending ostium of the LAD.    Left anterior descending artery:  Type three-vessel with calcified ostial stenosis of at least 70%.    Circumflex artery:  Nondominant calcified vessel with proximal 40-50% stenosis.  The circumflex gives rise to a large patent bifurcating OM.  Right coronary artery:  Calcified dominant vessel with serial heavily calcified segment stenosis of up to 80-90%.  The RCA terminates in a moderate bifurcating PDA and a small PL segment.    Selective LIMA angiogram:  Large widely patent vessel  Aortic root angiogram:  Calcified Type 3 bovine arch with patent innominate and proximal ICA bilateral.  Abdominal angiogram: No AAA.  Left iliac system patent.  Left common femoral artery patent proximally.  Right common and internal iliac patent.  Right external iliac with 70-80% stenosis at the pelvic brim.  Right common femoral occluded.  Left ventriculography:  EF- 50%.    Hemodynamics:LV/AO= 0 mmHg                      LVEDP= 13-16 mmHg       Carotid US (3.21.24):  The right internal carotid artery demonstrated 50-69% stenosis.   The left internal carotid artery demonstrated less than 50% stenosis.  Bilateral vertebral arteries were patent with antegrade flow.     Echocardiogram (3.5.24):  The left ventricle is decreased in size. Global left ventricular systolic function is borderline normal. The left ventricular ejection fraction is 50%. The left ventricle  diastolic function is impaired (Grade I) with normal left atrial pressure.   The right atrium is moderately enlarged ~5.1 cm.  Mild (1+) tricuspid and trace mitral regurgitation.  The pulmonary artery systolic pressure is 23 mmHg.  The study quality is below average.      CV US Arterial Lower Extremities (3.5.24):  The study quality is good.   The right proximal superficial femoral artery is occluded.   The right mid and distal superficial femoral, posterior tibial, peroneal, anterior tibial, and dorsalis pedis arteries exhibit poor, diminished perfusion via collateral flow.  The right lower extremity arteries exhibit mono-phasic waveforms.  The left external iliac and common femoral arteries exhibit mono-phasic waveforms.  The remaining arteries of the left lower extremity exhibit bi-phasic waveforms.      PET (3.1.24):  This is an abnormal perfusion study. Study is consistent with significant anterior  ischemia.   This scan is suggestive of moderate to high risk for future cardiovascular events.   Large partially reversible perfusion abnormality of severe intensity in the anterior apical region. Large partially reversible perfusion abnormality of severe intensity in the anterior septal region. Small reversible perfusion abnormality of severe intensity in the apical lateral segment.   The left ventricular cavity is noted to be normal on the stress studies. The stress left ventricular ejection fraction was calculated to be 37% and left ventricular global function is moderately reduced. The rest left ventricular cavity is noted to be normal. The rest left ventricular ejection fraction was calculated to be 52% and rest left ventricular global function is normal.   Hypokinesis of apical anterior segment is noted only in the stress studies which is suggestive of new ischemia. Persistent hypokinesis of the septal region is noted in both rest and stress studies. When compared to the resting ejection fraction (52%), the  stress ejection fraction (37%) has decreased.   Transient ischemic dilatation is present and has been described as a marker for high risk coronary artery disease. It has also been described in microvascular disease, hypertensive heart disease as well as cardiac deconditioning.   The study quality is good.   There was no rise in myocardial blood flow between rest and stress.  Global myocardial blood flow reserve was 0.83.  Myocardial blood flow reserve is globally abnormal, placing the patient at a higher coronary event risk.        Review of patient's allergies indicates:   Allergen Reactions    Codeine        No current facility-administered medications for this encounter.     Current Outpatient Medications   Medication Sig Dispense Refill    amiodarone (PACERONE) 200 MG Tab Take 1 tablet (200 mg total) by mouth 2 (two) times daily for 1 day, THEN 1 tablet (200 mg total) once daily. 92 tablet 0    amLODIPine (NORVASC) 10 MG tablet Take 10 mg by mouth once daily.      apixaban (ELIQUIS) 5 mg Tab Take 1 tablet (5 mg total) by mouth 2 (two) times daily. 30 tablet 3    aspirin (ECOTRIN) 81 MG EC tablet Take 81 mg by mouth once daily.      atorvastatin (LIPITOR) 40 MG tablet Take 1 tablet (40 mg total) by mouth once daily. 90 tablet 3    furosemide (LASIX) 40 MG tablet Take 1 tablet (40 mg total) by mouth once daily. 30 tablet 11    metoprolol succinate (TOPROL-XL) 25 MG 24 hr tablet Take 1 tablet (25 mg total) by mouth once daily. 30 tablet 11    tamsulosin (FLOMAX) 0.4 mg Cap Take 0.4 mg by mouth once daily. Take one capsule by mouth every night       Facility-Administered Medications Ordered in Other Encounters   Medication Dose Route Frequency Provider Last Rate Last Admin    diazePAM tablet 10 mg  10 mg Oral On Call Procedure Monse Lima MD   10 mg at 03/21/24 1021    diphenhydrAMINE capsule 50 mg  50 mg Oral On Call Procedure Monse Lima MD   25 mg at 04/02/24 0200    sodium chloride 0.9% flush 10  mL  10 mL Intravenous PRN Monse Lima MD           Review of Systems:  Review of Systems   Constitutional: Negative.    HENT: Negative.     Eyes: Negative.    Respiratory:          Hemoptysis   Cardiovascular: Negative.    Gastrointestinal: Negative.    Endocrine: Negative.    Genitourinary: Negative.    Musculoskeletal: Negative.    Skin: Negative.    Allergic/Immunologic: Negative.    Neurological: Negative.    Hematological: Negative.    Psychiatric/Behavioral: Negative.         Objective:     Vital Signs (Most Recent):  Temp: 97.3 °F (36.3 °C) (05/05/24 2038)  Pulse: 61 (05/05/24 2228)  Resp: 16 (05/05/24 2228)  BP: 119/67 (05/05/24 2228)  SpO2: (!) 93 % (05/05/24 2228) Vital Signs (24h Range):  Temp:  [97.3 °F (36.3 °C)] 97.3 °F (36.3 °C)  Pulse:  [61-70] 61  Resp:  [15-21] 16  SpO2:  [92 %-94 %] 93 %  BP: (115-151)/(64-80) 119/67     Weight: 50.3 kg (110 lb 14.3 oz)  Body mass index is 19.03 kg/m².    SpO2: (!) 93 %       No intake or output data in the 24 hours ending 05/05/24 2243    Lines/Drains/Airways       None                     Significant Labs:   Recent Lab Results         05/05/24  2105        Albumin/Globulin Ratio 1.1       Albumin 3.9              ALT 22       PTT 31.5  Comment: For Minimal Heparin Infusion, the goal aPTT 64-85 seconds corresponds to an anti-Xa of 0.3-0.5.    For Low Intensity and High Intensity Heparin, the goal aPTT  seconds corresponds to an anti-Xa of 0.3-0.7       AST 19       Baso # 0.07       Basophil % 0.9       BILIRUBIN TOTAL 0.6       .2       BUN 16.0       Calcium 9.5       Chloride 104       CO2 26       Creatinine 1.43       D-Dimer 1.87  Comment: D-dimer values of less than 0.5ug/mL FEU in adult patients with a clinically low pre-test probability of developing a DVT yield  a 99% negative predictive value.  D-dimer increases naturally with age, therefore the negative predictive value in patients older than 80 is 21 - 31%.    D-Dimer  testing at [facility] and [facility] is used as an aid in the diagnosis of VTE/PE. The D-Dimer test must be used with one or more additional tests such as imaging studies to evaluate VTE/PE       eGFR 53       Eos # 0.36       Eos % 4.8       Globulin, Total 3.6       Glucose 122       Hematocrit 37.8       Hemoglobin 12.5       Immature Grans (Abs) 0.05       Immature Granulocytes 0.7       INR 1.1       Lymph # 2.07       LYMPH % 27.7       MCH 31.2       MCHC 33.1       MCV 94.3       Mono # 0.63       Mono % 8.4       MPV 9.6       Neut # 4.29       Neut % 57.5       Platelet Count 269       Potassium 4.0       PROTEIN TOTAL 7.5       PT 14.0       RBC 4.01       RDW 14.2       Sodium 141       Troponin I 0.025       WBC 7.47                 Significant Imaging:   Imaging Results              X-Ray Chest PA And Lateral (Final result)  Result time 05/05/24 21:26:12      Final result by Dale Aldana MD (05/05/24 21:26:12)                   Impression:      Changes consistent with bilateral pleural effusions with associated atelectasis.  The right is worse than on the previous film      Electronically signed by: Dale Aldana MD  Date:    05/05/2024  Time:    21:26               Narrative:    EXAMINATION:  XR CHEST PA AND LATERAL    CLINICAL HISTORY:  Hemoptysis    TECHNIQUE:  PA and lateral views of the chest were performed.    COMPARISON:  03/29/2024    FINDINGS:  There is blunting of both costophrenic angles most consistent with bilateral effusions right greater than left.  Heart size is within normal limits.  Patient has had a previous sternotomy.  There is vascular calcification noted.                                        EKG:        Telemetry:  SR    Physical Exam:  Physical Exam  HENT:      Head: Atraumatic.   Eyes:      Extraocular Movements: Extraocular movements intact.   Cardiovascular:      Rate and Rhythm: Normal rate and regular rhythm.      Heart sounds:  Normal heart sounds.   Pulmonary:      Effort: Pulmonary effort is normal.   Neurological:      General: No focal deficit present.      Mental Status: He is alert and oriented to person, place, and time.   Psychiatric:         Mood and Affect: Mood normal.         Behavior: Behavior normal.         Home Medications:   Current Facility-Administered Medications on File Prior to Encounter   Medication Dose Route Frequency Provider Last Rate Last Admin    diazePAM tablet 10 mg  10 mg Oral On Call Procedure Monse Lima MD   10 mg at 03/21/24 1021    diphenhydrAMINE capsule 50 mg  50 mg Oral On Call Procedure Monse Lima MD   25 mg at 04/02/24 0200    sodium chloride 0.9% flush 10 mL  10 mL Intravenous PRN Monse Lima MD         Current Outpatient Medications on File Prior to Encounter   Medication Sig Dispense Refill    amiodarone (PACERONE) 200 MG Tab Take 1 tablet (200 mg total) by mouth 2 (two) times daily for 1 day, THEN 1 tablet (200 mg total) once daily. 92 tablet 0    amLODIPine (NORVASC) 10 MG tablet Take 10 mg by mouth once daily.      apixaban (ELIQUIS) 5 mg Tab Take 1 tablet (5 mg total) by mouth 2 (two) times daily. 30 tablet 3    aspirin (ECOTRIN) 81 MG EC tablet Take 81 mg by mouth once daily.      atorvastatin (LIPITOR) 40 MG tablet Take 1 tablet (40 mg total) by mouth once daily. 90 tablet 3    metoprolol succinate (TOPROL-XL) 25 MG 24 hr tablet Take 1 tablet (25 mg total) by mouth once daily. 30 tablet 11    tamsulosin (FLOMAX) 0.4 mg Cap Take 0.4 mg by mouth once daily. Take one capsule by mouth every night         Current Inpatient Medications:  No current facility-administered medications for this encounter.    Current Outpatient Medications:     amiodarone (PACERONE) 200 MG Tab, Take 1 tablet (200 mg total) by mouth 2 (two) times daily for 1 day, THEN 1 tablet (200 mg total) once daily., Disp: 92 tablet, Rfl: 0    amLODIPine (NORVASC) 10 MG tablet, Take 10 mg by mouth once  daily., Disp: , Rfl:     apixaban (ELIQUIS) 5 mg Tab, Take 1 tablet (5 mg total) by mouth 2 (two) times daily., Disp: 30 tablet, Rfl: 3    aspirin (ECOTRIN) 81 MG EC tablet, Take 81 mg by mouth once daily., Disp: , Rfl:     atorvastatin (LIPITOR) 40 MG tablet, Take 1 tablet (40 mg total) by mouth once daily., Disp: 90 tablet, Rfl: 3    furosemide (LASIX) 40 MG tablet, Take 1 tablet (40 mg total) by mouth once daily., Disp: 30 tablet, Rfl: 11    metoprolol succinate (TOPROL-XL) 25 MG 24 hr tablet, Take 1 tablet (25 mg total) by mouth once daily., Disp: 30 tablet, Rfl: 11    tamsulosin (FLOMAX) 0.4 mg Cap, Take 0.4 mg by mouth once daily. Take one capsule by mouth every night, Disp: , Rfl:     Facility-Administered Medications Ordered in Other Encounters:     diazePAM tablet 10 mg, 10 mg, Oral, On Call Procedure, Monse Liam MD, 10 mg at 03/21/24 1021    diphenhydrAMINE capsule 50 mg, 50 mg, Oral, On Call Procedure, Monse Lima MD, 25 mg at 04/02/24 0200    sodium chloride 0.9% flush 10 mL, 10 mL, Intravenous, PRN, Monse Lima MD           Assessment:     IMPRESSION:  Hemoptysis, resolved  Bilateral Pleural effusion, worse on the right  CAD/CABG x3 (LIMA to LAD, SVG to OM, SVG to dRCA)  Post-op Afib, now SR  - CHADSVASC Score 3 Points   -HANK ligation  Hypertension  PAD    - REIA 70-80% Stenosis at Pelvic Brim (3.21.24)  Nicotine Dependence/Chronic Tobacco Use, has quit  ROBBI    - 50-69% MUSTAPHA (CUS 3.21.24)  Anemia    PLAN:     PLAN:  Okay to WA home  Continue current medications  Lasix 40mg PO daily x 5 days, then as needed for SOB/edema/weight gain >5lbs in 1 week  F/U with DR. Oconnell  or KITTY in 7-10 days    Thank you for your consult.     Otto Flores, [facility]-BC  Cardiology  [facility] Emergency Dept  05/05/2024 10:43 PM
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
## 2024-08-29 NOTE — PLAN OF CARE
Problem: Adult Inpatient Plan of Care  Goal: Plan of Care Review  Outcome: Ongoing, Progressing  Goal: Absence of Hospital-Acquired Illness or Injury  Outcome: Ongoing, Progressing  Goal: Optimal Comfort and Wellbeing  Outcome: Ongoing, Progressing  Goal: Readiness for Transition of Care  Outcome: Ongoing, Progressing     Problem: Impaired Wound Healing  Goal: Optimal Wound Healing  Outcome: Ongoing, Progressing     Problem: Infection  Goal: Absence of Infection Signs and Symptoms  Outcome: Ongoing, Progressing     Problem: Skin Injury Risk Increased  Goal: Skin Health and Integrity  Outcome: Ongoing, Progressing     Problem: Fall Injury Risk  Goal: Absence of Fall and Fall-Related Injury  Outcome: Ongoing, Progressing      Parent confirmed letter received, no questions. Obtained medication

## 2024-10-09 ENCOUNTER — LAB VISIT (OUTPATIENT)
Dept: LAB | Facility: HOSPITAL | Age: 69
End: 2024-10-09
Attending: INTERNAL MEDICINE
Payer: COMMERCIAL

## 2024-10-09 DIAGNOSIS — R07.9 CHEST PAIN, UNSPECIFIED TYPE: ICD-10-CM

## 2024-10-09 DIAGNOSIS — I20.89 ANGINAL EQUIVALENT: Primary | ICD-10-CM

## 2024-10-09 LAB
ANION GAP SERPL CALC-SCNC: 11 MEQ/L
BUN SERPL-MCNC: 15 MG/DL (ref 8.4–25.7)
CALCIUM SERPL-MCNC: 9.7 MG/DL (ref 8.8–10)
CHLORIDE SERPL-SCNC: 102 MMOL/L (ref 98–107)
CO2 SERPL-SCNC: 28 MMOL/L (ref 23–31)
CREAT SERPL-MCNC: 0.9 MG/DL (ref 0.73–1.18)
CREAT/UREA NIT SERPL: 17
GFR SERPLBLD CREATININE-BSD FMLA CKD-EPI: >60 ML/MIN/1.73/M2
GLUCOSE SERPL-MCNC: 101 MG/DL (ref 82–115)
POTASSIUM SERPL-SCNC: 3.7 MMOL/L (ref 3.5–5.1)
SODIUM SERPL-SCNC: 141 MMOL/L (ref 136–145)

## 2024-10-09 PROCEDURE — 36415 COLL VENOUS BLD VENIPUNCTURE: CPT

## 2024-10-09 PROCEDURE — 80048 BASIC METABOLIC PNL TOTAL CA: CPT

## 2025-01-15 ENCOUNTER — HOSPITAL ENCOUNTER (OUTPATIENT)
Dept: RADIOLOGY | Facility: HOSPITAL | Age: 70
Discharge: HOME | End: 2025-01-15
Payer: COMMERCIAL

## 2025-01-15 DIAGNOSIS — R06.02 SHORTNESS OF BREATH: ICD-10-CM

## 2025-01-15 DIAGNOSIS — R05.9 COUGH: ICD-10-CM

## 2025-01-15 PROCEDURE — 71046 X-RAY EXAM CHEST 2 VIEWS: CPT | Mod: TC

## 2025-02-19 ENCOUNTER — HOSPITAL ENCOUNTER (OUTPATIENT)
Dept: RADIOLOGY | Facility: HOSPITAL | Age: 70
Discharge: HOME | End: 2025-02-19
Attending: HOSPITALIST
Payer: COMMERCIAL

## 2025-02-19 DIAGNOSIS — R91.1 LUNG NODULE, SOLITARY: ICD-10-CM

## 2025-02-19 PROCEDURE — A9552 F18 FDG: HCPCS | Performed by: HOSPITALIST

## 2025-02-19 PROCEDURE — 78815 PET IMAGE W/CT SKULL-THIGH: CPT | Mod: TC

## 2025-02-19 RX ORDER — FLUDEOXYGLUCOSE F18 500 MCI/ML
10 INJECTION INTRAVENOUS
Status: COMPLETED | OUTPATIENT
Start: 2025-02-19 | End: 2025-02-19

## 2025-02-19 RX ADMIN — FLUDEOXYGLUCOSE F-18 10 MILLICURIE: 500 INJECTION INTRAVENOUS at 07:02

## 2025-02-20 ENCOUNTER — TELEPHONE (OUTPATIENT)
Dept: PULMONOLOGY | Facility: HOSPITAL | Age: 70
End: 2025-02-20
Payer: COMMERCIAL

## 2025-02-20 NOTE — TELEPHONE ENCOUNTER
Patient had a PET scan to evaluate a 1.6 cm pulmonary nodule.  The PET scan shows similar nodule.  There is no hypermetabolism.    Patient was notified of this over the phone and we will plan a follow-up office visit and CT of the chest in 6 months.

## 2025-07-30 ENCOUNTER — LAB VISIT (OUTPATIENT)
Dept: LAB | Facility: HOSPITAL | Age: 70
End: 2025-07-30
Attending: INTERNAL MEDICINE
Payer: COMMERCIAL

## 2025-07-30 DIAGNOSIS — I70.203 BILATERAL FEMORAL ARTERY STENOSIS: Primary | ICD-10-CM

## 2025-07-30 LAB
ANION GAP SERPL CALC-SCNC: 9 MEQ/L
BUN SERPL-MCNC: 16 MG/DL (ref 8.4–25.7)
CALCIUM SERPL-MCNC: 9.5 MG/DL (ref 8.8–10)
CHLORIDE SERPL-SCNC: 104 MMOL/L (ref 98–107)
CO2 SERPL-SCNC: 26 MMOL/L (ref 23–31)
CREAT SERPL-MCNC: 0.75 MG/DL (ref 0.72–1.25)
CREAT/UREA NIT SERPL: 21
ERYTHROCYTE [DISTWIDTH] IN BLOOD BY AUTOMATED COUNT: 12.8 % (ref 11.5–17)
GFR SERPLBLD CREATININE-BSD FMLA CKD-EPI: >60 ML/MIN/1.73/M2
GLUCOSE SERPL-MCNC: 83 MG/DL (ref 82–115)
HCT VFR BLD AUTO: 40.6 % (ref 42–52)
HGB BLD-MCNC: 13.5 G/DL (ref 14–18)
MCH RBC QN AUTO: 31.5 PG (ref 27–31)
MCHC RBC AUTO-ENTMCNC: 33.3 G/DL (ref 33–36)
MCV RBC AUTO: 94.6 FL (ref 80–94)
NRBC BLD AUTO-RTO: 0 %
PLATELET # BLD AUTO: 289 X10(3)/MCL (ref 130–400)
PMV BLD AUTO: 10 FL (ref 7.4–10.4)
POTASSIUM SERPL-SCNC: 4.5 MMOL/L (ref 3.5–5.1)
RBC # BLD AUTO: 4.29 X10(6)/MCL (ref 4.7–6.1)
SODIUM SERPL-SCNC: 139 MMOL/L (ref 136–145)
WBC # BLD AUTO: 6.32 X10(3)/MCL (ref 4.5–11.5)

## 2025-07-30 PROCEDURE — 36415 COLL VENOUS BLD VENIPUNCTURE: CPT

## 2025-07-30 PROCEDURE — 85027 COMPLETE CBC AUTOMATED: CPT

## 2025-07-30 PROCEDURE — 80048 BASIC METABOLIC PNL TOTAL CA: CPT

## (undated) DEVICE — DRESSING TELFA + RECT 6X10IN

## (undated) DEVICE — GLOVE 7.5 PROTEXIS PI MICRO

## (undated) DEVICE — CATH IMPULSE 5FR PIGTAIL 125CM

## (undated) DEVICE — SUT PROLENE 7-0 BV-1 30 BLU

## (undated) DEVICE — GLOVE PROTEXIS NEOPRN SZ8

## (undated) DEVICE — SYS CLSR DERMABOND PRINEO 22CM

## (undated) DEVICE — KIT SURGICAL TURNOVER

## (undated) DEVICE — Device

## (undated) DEVICE — SYS VIRTUOSAPH PLUS EVM

## (undated) DEVICE — KIT MANIFOLD LOW PRESS TUBING

## (undated) DEVICE — GLOVE PROTEXIS BLUE LATEX 7.5

## (undated) DEVICE — GUIDEWIRE UROSTREAM STR .035IN

## (undated) DEVICE — CATH IMA INFINITI 4FRX100CM

## (undated) DEVICE — SOL ELECTROLYTE PH 7.4 500ML

## (undated) DEVICE — DRAPE SLUSH WARMER WITH DISC

## (undated) DEVICE — CATH THOR STND RGHT ANG 28F

## (undated) DEVICE — PACK OR CLEAN UP COMBO SIZE 2

## (undated) DEVICE — HOLDER STRIP-T SELF ADH 2X10IN

## (undated) DEVICE — SYR SLIP TIP 20CC

## (undated) DEVICE — TUBING INSUFFLATOR W/ROT CONCT

## (undated) DEVICE — COVER PROBE US 5.5X58L NON LTX

## (undated) DEVICE — PUNCH AORTIC ROT TIP 4.0MM

## (undated) DEVICE — HEMOCONCENTRATOR W TBNG ADPT

## (undated) DEVICE — INSERT INTRACK CLAMP ULT 66MM

## (undated) DEVICE — TUBE SUCTION 6.5 TIP 6FR

## (undated) DEVICE — SOL NACL IRR 3000ML

## (undated) DEVICE — SUT ETHBND XTRA 1 OS-8 30IN

## (undated) DEVICE — CARTRIDGE HEPARIN 2 CHNNL ACT

## (undated) DEVICE — CARTRIDGE HEPARIN DOSE

## (undated) DEVICE — CATH IMPULSE IM CRV 100CM 5FR

## (undated) DEVICE — SUT PROLENE 5/0 RB-1 36 IN

## (undated) DEVICE — BLADE SCALP OPHTL BEVEL STR

## (undated) DEVICE — KIT C.A.T.S. FAST START

## (undated) DEVICE — STOPCOCK 4-WAY

## (undated) DEVICE — SOL PLASMALYTE PH 7.4 1000ML

## (undated) DEVICE — GUIDEWIRE INQWIRE SE 3MM JTIP

## (undated) DEVICE — KIT CATHGARD DBL LUMN 9FRX11.5

## (undated) DEVICE — SOL NORMAL USPCA 0.9%

## (undated) DEVICE — BLANKET HYPER ADULT 24X60IN

## (undated) DEVICE — DRESSING TEGADERM CHG 3.5X4.5

## (undated) DEVICE — SUT PROLENE 7-0 BV175-6

## (undated) DEVICE — GLOVE COTTON KNITTED CUFF

## (undated) DEVICE — SUT 2/0 36IN ETHIBOND EXCE

## (undated) DEVICE — SYR 10CC LUER LOCK

## (undated) DEVICE — HEMOSTAT SURGICEL FIBRLR 2X4IN

## (undated) DEVICE — CANNULA MC2 OVAL NVENT 32/40FR

## (undated) DEVICE — WIRE INTRAMYOCARDIAL TEMP

## (undated) DEVICE — CATH THORACIC 28FR ST

## (undated) DEVICE — CABLE PACING ALLGTR CLIP 12FT

## (undated) DEVICE — KIT HAND CONTROL HIGH PRESSUR

## (undated) DEVICE — GLOVE PROTEXIS BLUE LATEX 7

## (undated) DEVICE — INSERT INTRACK CLAMP ULTRA 88M

## (undated) DEVICE — SENSOR LOW LEVEL OXYGEN

## (undated) DEVICE — GOWN SMARTSLEEVE AAMI LVL4 XXL

## (undated) DEVICE — GLOVE PROTEXIS HYDROGEL SZ6.5

## (undated) DEVICE — BAND TR COMP DEVICE REG 24CM

## (undated) DEVICE — PENCIL ELECSURG ROCKER 15FT

## (undated) DEVICE — SUT MONOCRYL PLUS UD 3-0 27

## (undated) DEVICE — CYSTOSCOPE ASCOPE 4 STD DEFL

## (undated) DEVICE — KIT VAVD

## (undated) DEVICE — DRESSING TELFA + BARR 4X6IN

## (undated) DEVICE — GUIDEWIRE STF .035X260CM ANG

## (undated) DEVICE — CONTRAST ISOVUE 370 500ML MULT

## (undated) DEVICE — NDL ASPIRATOR AIR 16G

## (undated) DEVICE — CATH JACKY RADIAL 5FR 100CM

## (undated) DEVICE — SOL LAC RINGERS 1000ML INJ

## (undated) DEVICE — SPONGE LAP 18X18 PREWASHED

## (undated) DEVICE — SUT PROLENE 4-0 RB-1 BL MO

## (undated) DEVICE — PAD DEFIB CADENCE ADULT R2

## (undated) DEVICE — PATCH SEALANT TACHOSIL 9.5X4.8

## (undated) DEVICE — SPONGE GAUZE 16PLY 4X4

## (undated) DEVICE — PACK SURG PERF CARDPULM BYPS

## (undated) DEVICE — CARTRIDGE SILV 4CHAN 2.0-3.5MG

## (undated) DEVICE — SOL .9NACL PF 100 ML

## (undated) DEVICE — BOWL STERILE LG GRAD 32OZ

## (undated) DEVICE — GLOVE PROTEXIS PI SYN SURG 7.5

## (undated) DEVICE — TRAY CATH FOL SIL TEMP 10 16FR

## (undated) DEVICE — SUT SILK 2-0 BLK BR KS 30 I

## (undated) DEVICE — APPLIER LIGACLIP SM 9.38IN

## (undated) DEVICE — CANNULA NON-VENT 24FR 14.5IN

## (undated) DEVICE — BAG MEDI-PLAST DECANTER C-FLOW

## (undated) DEVICE — SUT 2 30IN SILK BLK BRAIDE

## (undated) DEVICE — SUT PROLENE 5-0 36IN C-1

## (undated) DEVICE — CANNULA DUAL CO2/O2 NASAL 7FT

## (undated) DEVICE — DRAIN CHEST DRY SUCTION